# Patient Record
Sex: FEMALE | Race: WHITE | Employment: OTHER | URBAN - METROPOLITAN AREA
[De-identification: names, ages, dates, MRNs, and addresses within clinical notes are randomized per-mention and may not be internally consistent; named-entity substitution may affect disease eponyms.]

---

## 2019-07-06 ENCOUNTER — APPOINTMENT (OUTPATIENT)
Dept: CT IMAGING | Age: 74
End: 2019-07-06
Attending: EMERGENCY MEDICINE
Payer: MEDICARE

## 2019-07-06 ENCOUNTER — HOSPITAL ENCOUNTER (EMERGENCY)
Age: 74
Discharge: HOME | End: 2019-07-06
Attending: EMERGENCY MEDICINE
Payer: MEDICARE

## 2019-07-06 VITALS
RESPIRATION RATE: 18 BRPM | TEMPERATURE: 98.1 F | DIASTOLIC BLOOD PRESSURE: 55 MMHG | BODY MASS INDEX: 32.59 KG/M2 | HEIGHT: 61 IN | WEIGHT: 172.62 LBS | HEART RATE: 68 BPM | OXYGEN SATURATION: 97 % | SYSTOLIC BLOOD PRESSURE: 120 MMHG

## 2019-07-06 DIAGNOSIS — N17.9 AKI (ACUTE KIDNEY INJURY) (HCC): ICD-10-CM

## 2019-07-06 DIAGNOSIS — I95.1 ORTHOSTATIC HYPOTENSION: Primary | ICD-10-CM

## 2019-07-06 DIAGNOSIS — E86.0 DEHYDRATION: ICD-10-CM

## 2019-07-06 LAB
ALBUMIN SERPL-MCNC: 3.9 G/DL (ref 3.5–5)
ALBUMIN/GLOB SERPL: 1.1 {RATIO} (ref 1.1–2.2)
ALP SERPL-CCNC: 103 U/L (ref 45–117)
ALT SERPL-CCNC: 16 U/L (ref 12–78)
ANION GAP SERPL CALC-SCNC: 5 MMOL/L (ref 5–15)
APPEARANCE UR: CLEAR
AST SERPL-CCNC: 10 U/L (ref 15–37)
BACTERIA URNS QL MICRO: ABNORMAL /HPF
BASOPHILS # BLD: 0.1 K/UL (ref 0–0.1)
BASOPHILS NFR BLD: 1 % (ref 0–1)
BILIRUB SERPL-MCNC: 0.5 MG/DL (ref 0.2–1)
BILIRUB UR QL: NEGATIVE
BUN SERPL-MCNC: 21 MG/DL (ref 6–20)
BUN/CREAT SERPL: 16 (ref 12–20)
CALCIUM SERPL-MCNC: 9.9 MG/DL (ref 8.5–10.1)
CHLORIDE SERPL-SCNC: 96 MMOL/L (ref 97–108)
CO2 SERPL-SCNC: 30 MMOL/L (ref 21–32)
COLOR UR: ABNORMAL
COMMENT, HOLDF: NORMAL
CREAT SERPL-MCNC: 1.3 MG/DL (ref 0.55–1.02)
DIFFERENTIAL METHOD BLD: ABNORMAL
EOSINOPHIL # BLD: 0.1 K/UL (ref 0–0.4)
EOSINOPHIL NFR BLD: 1 % (ref 0–7)
EPITH CASTS URNS QL MICRO: ABNORMAL /LPF
ERYTHROCYTE [DISTWIDTH] IN BLOOD BY AUTOMATED COUNT: 12.8 % (ref 11.5–14.5)
GLOBULIN SER CALC-MCNC: 3.7 G/DL (ref 2–4)
GLUCOSE BLD STRIP.AUTO-MCNC: 129 MG/DL (ref 65–100)
GLUCOSE SERPL-MCNC: 92 MG/DL (ref 65–100)
GLUCOSE UR STRIP.AUTO-MCNC: NEGATIVE MG/DL
HCT VFR BLD AUTO: 44.7 % (ref 35–47)
HGB BLD-MCNC: 14.8 G/DL (ref 11.5–16)
HGB UR QL STRIP: NEGATIVE
HYALINE CASTS URNS QL MICRO: ABNORMAL /LPF (ref 0–5)
IMM GRANULOCYTES # BLD AUTO: 0.1 K/UL (ref 0–0.04)
IMM GRANULOCYTES NFR BLD AUTO: 1 % (ref 0–0.5)
KETONES UR QL STRIP.AUTO: NEGATIVE MG/DL
LEUKOCYTE ESTERASE UR QL STRIP.AUTO: ABNORMAL
LYMPHOCYTES # BLD: 0.8 K/UL (ref 0.8–3.5)
LYMPHOCYTES NFR BLD: 6 % (ref 12–49)
MCH RBC QN AUTO: 30.2 PG (ref 26–34)
MCHC RBC AUTO-ENTMCNC: 33.1 G/DL (ref 30–36.5)
MCV RBC AUTO: 91.2 FL (ref 80–99)
MONOCYTES # BLD: 1.4 K/UL (ref 0–1)
MONOCYTES NFR BLD: 10 % (ref 5–13)
NEUTS SEG # BLD: 12.2 K/UL (ref 1.8–8)
NEUTS SEG NFR BLD: 81 % (ref 32–75)
NITRITE UR QL STRIP.AUTO: NEGATIVE
NRBC # BLD: 0 K/UL (ref 0–0.01)
NRBC BLD-RTO: 0 PER 100 WBC
PH UR STRIP: 6.5 [PH] (ref 5–8)
PLATELET # BLD AUTO: 253 K/UL (ref 150–400)
PMV BLD AUTO: 11.1 FL (ref 8.9–12.9)
POTASSIUM SERPL-SCNC: 3.9 MMOL/L (ref 3.5–5.1)
PROT SERPL-MCNC: 7.6 G/DL (ref 6.4–8.2)
PROT UR STRIP-MCNC: NEGATIVE MG/DL
RBC # BLD AUTO: 4.9 M/UL (ref 3.8–5.2)
RBC #/AREA URNS HPF: ABNORMAL /HPF (ref 0–5)
SAMPLES BEING HELD,HOLD: NORMAL
SERVICE CMNT-IMP: ABNORMAL
SODIUM SERPL-SCNC: 131 MMOL/L (ref 136–145)
SP GR UR REFRACTOMETRY: 1.01 (ref 1–1.03)
TROPONIN I SERPL-MCNC: <0.05 NG/ML
UA: UC IF INDICATED,UAUC: ABNORMAL
UROBILINOGEN UR QL STRIP.AUTO: 0.2 EU/DL (ref 0.2–1)
WBC # BLD AUTO: 14.6 K/UL (ref 3.6–11)
WBC URNS QL MICRO: ABNORMAL /HPF (ref 0–4)

## 2019-07-06 PROCEDURE — 80053 COMPREHEN METABOLIC PANEL: CPT

## 2019-07-06 PROCEDURE — 85025 COMPLETE CBC W/AUTO DIFF WBC: CPT

## 2019-07-06 PROCEDURE — 96361 HYDRATE IV INFUSION ADD-ON: CPT

## 2019-07-06 PROCEDURE — 36415 COLL VENOUS BLD VENIPUNCTURE: CPT

## 2019-07-06 PROCEDURE — 99285 EMERGENCY DEPT VISIT HI MDM: CPT

## 2019-07-06 PROCEDURE — 82962 GLUCOSE BLOOD TEST: CPT

## 2019-07-06 PROCEDURE — 81001 URINALYSIS AUTO W/SCOPE: CPT

## 2019-07-06 PROCEDURE — 96360 HYDRATION IV INFUSION INIT: CPT

## 2019-07-06 PROCEDURE — 70450 CT HEAD/BRAIN W/O DYE: CPT

## 2019-07-06 PROCEDURE — 74011250636 HC RX REV CODE- 250/636: Performed by: EMERGENCY MEDICINE

## 2019-07-06 PROCEDURE — 93005 ELECTROCARDIOGRAM TRACING: CPT

## 2019-07-06 PROCEDURE — 87086 URINE CULTURE/COLONY COUNT: CPT

## 2019-07-06 PROCEDURE — 84484 ASSAY OF TROPONIN QUANT: CPT

## 2019-07-06 RX ORDER — LATANOPROST 50 UG/ML
1 SOLUTION/ DROPS OPHTHALMIC
COMMUNITY
Start: 2021-03-04

## 2019-07-06 RX ORDER — MELATONIN
1000 EVERY EVENING
Status: ON HOLD | COMMUNITY
End: 2020-11-25

## 2019-07-06 RX ORDER — NAPROXEN SODIUM 220 MG
220 TABLET ORAL
COMMUNITY
End: 2021-01-27

## 2019-07-06 RX ORDER — LISINOPRIL 20 MG/1
20 TABLET ORAL DAILY
COMMUNITY
Start: 2021-03-04

## 2019-07-06 RX ORDER — CALCIUM CARBONATE 500(1250)
1 TABLET ORAL DAILY
Status: ON HOLD | COMMUNITY
End: 2020-11-25

## 2019-07-06 RX ORDER — AMLODIPINE BESYLATE 5 MG/1
5 TABLET ORAL DAILY
COMMUNITY
Start: 2021-03-04

## 2019-07-06 RX ORDER — TRIAMTERENE/HYDROCHLOROTHIAZID 37.5-25 MG
0.5 TABLET ORAL DAILY
Status: ON HOLD | COMMUNITY
End: 2020-11-25

## 2019-07-06 RX ORDER — DONEPEZIL HYDROCHLORIDE 5 MG/1
5 TABLET, FILM COATED ORAL
COMMUNITY
End: 2021-03-04 | Stop reason: ALTCHOICE

## 2019-07-06 RX ADMIN — SODIUM CHLORIDE 1000 ML: 900 INJECTION, SOLUTION INTRAVENOUS at 13:09

## 2019-07-06 NOTE — ED PROVIDER NOTES
EMERGENCY DEPARTMENT HISTORY AND PHYSICAL EXAM      Date: 7/6/2019  Patient Name: Erich oBwman  Patient Age and Sex: 74 y.o. female     History of Presenting Illness     Chief Complaint   Patient presents with    Syncope     pt was getting ready to bathe when she slumped over for about 45 seconds. pt woke up with slurred speech and confusion       History Provided By: Patient    HPI: Erich Bowman is a 47-year-old female with a history of dementia presenting for syncope. According to patient's daughter, patient was getting ready to bathe in the bathroom when she slumped over and passed out for less than a minute. Her eyes rolled back and she was unresponsive. Has come to slowly after that. Right after the incident she had some slurred speech and was confused. However now she is alert and following all commands. Daughter states that patient lives with boyfriend and has no history of syncope in the past.  When asked patient if she eats and drinks well, patient states that she has brunch and dinner. Daughter states that she is also been drinking a lot of cola and not enough water. Denies any head injury, chest pain, shortness of breath, extremity injury. Patient states that she feels baseline right now. There are no other complaints, changes, or physical findings at this time. PCP: Radha Lobo MD    No current facility-administered medications on file prior to encounter. Current Outpatient Medications on File Prior to Encounter   Medication Sig Dispense Refill    calcium carbonate (OS-MARYLOU) 500 mg calcium (1,250 mg) tablet Take 1 Tab by mouth daily.  cholecalciferol (VITAMIN D3) 1,000 unit tablet Take 1,000 Units by mouth every evening.  aspirin/salicylamide/caffeine (BC HEADACHE POWDER PO) Take 0.5 Packets by mouth two (2) times daily as needed for Pain.  naproxen sodium (ALEVE) 220 mg tablet Take 220 mg by mouth two (2) times daily as needed for Pain.       amLODIPine (NORVASC) 5 mg tablet Take 5 mg by mouth daily.  donepezil (ARICEPT) 5 mg tablet Take 5 mg by mouth nightly.  latanoprost (XALATAN) 0.005 % ophthalmic solution Administer 1 Drop to both eyes nightly.  lisinopril (PRINIVIL, ZESTRIL) 20 mg tablet Take 20 mg by mouth daily.  triamterene-hydroCHLOROthiazide (MAXZIDE) 37.5-25 mg per tablet Take 0.5 Tabs by mouth daily.  diazepam (VALIUM) 5 mg tablet Take 2.5 mg by mouth every evening.  simvastatin (ZOCOR) 40 mg tablet Take 40 mg by mouth every evening.  levothyroxine (SYNTHROID) 75 mcg tablet Take 75 mcg by mouth daily (before breakfast). Past History     Past Medical History:  Past Medical History:   Diagnosis Date    CAD (coronary artery disease)     high cholesterol    Endocrine disease     thyroid    Heart failure (HCC)     MI    Psychiatric disorder     anxiety, depression       Past Surgical History:  Past Surgical History:   Procedure Laterality Date    HX GYN      2 c sections    HX HEENT      tonsilectomy       Family History:  Family History   Problem Relation Age of Onset    Hypertension Mother     Diabetes Mother     Emphysema Father     Asthma Daughter        Social History:  Social History     Tobacco Use    Smoking status: Current Every Day Smoker     Packs/day: 0.50     Years: 10.00     Pack years: 5.00   Substance Use Topics    Alcohol use: No    Drug use: No       Allergies:   Allergies   Allergen Reactions    Augmentin [Amoxicillin-Pot Clavulanate] Nausea Only     Abdominal pain    Belladonna Alkaloids Swelling     Swelling of tongue    Carbocaine [Mepivacaine] Other (comments)     Chest pain    Cortisone Other (comments)     depression    Decadron [Dexamethasone] Other (comments)     Altered mental status    Erythromycin Nausea Only    Meprobamate Swelling     tongue    Paxil [Paroxetine Hcl] Other (comments)     Headache, syncope    Vantin [Cefpodoxime] Unknown (comments)    Vibramycin Calcium Nausea Only    Zoloft [Sertraline] Other (comments)     Headache, syncope         Review of Systems   Review of Systems   Constitutional: Negative for chills and fever. Respiratory: Negative for cough and shortness of breath. Cardiovascular: Negative for chest pain. Gastrointestinal: Negative for constipation, diarrhea, nausea and vomiting. Neurological: Positive for syncope and speech difficulty. Negative for weakness and numbness. Psychiatric/Behavioral: Positive for confusion. All other systems reviewed and are negative. Physical Exam   Physical Exam   Constitutional: She appears well-developed and well-nourished. HENT:   Head: Normocephalic and atraumatic. Eyes: Pupils are equal, round, and reactive to light. Conjunctivae and EOM are normal.   Neck: Normal range of motion. Neck supple. Cardiovascular: Normal rate and regular rhythm. Pulmonary/Chest: Effort normal and breath sounds normal. No respiratory distress. She has no wheezes. She has no rales. Abdominal: Soft. She exhibits no distension. There is no tenderness. Musculoskeletal: Normal range of motion. Neurological: She is alert. No cranial nerve deficit. CN 2-12 intact, 5/5 strength in all 4 extremities, Finger to nose intact, patient is alert and following all commands and answering questions appropriately. She is oriented to person and place. Skin: Skin is warm and dry. Psychiatric: She has a normal mood and affect. Nursing note and vitals reviewed. Diagnostic Study Results     Labs -     No results found for this or any previous visit (from the past 12 hour(s)). Radiologic Studies -   CT HEAD WO CONT   Final Result   IMPRESSION: Extensive white matter disease likely related to chronic small   vessel ischemic disease. No evidence of acute process.               CT Results  (Last 48 hours)               07/06/19 1507  CT HEAD WO CONT Final result    Impression:  IMPRESSION: Extensive white matter disease likely related to chronic small   vessel ischemic disease. No evidence of acute process. Narrative:  EXAM: CT HEAD WO CONT       INDICATION: Syncope/fainting       COMPARISON: 12/23/2010. CONTRAST: None. TECHNIQUE: Unenhanced CT of the head was performed using 5 mm images. Brain and   bone windows were generated. CT dose reduction was achieved through use of a   standardized protocol tailored for this examination and automatic exposure   control for dose modulation. FINDINGS:   The ventricles and sulci are normal in size, shape and configuration and   midline. There is extensive white matter disease likely to chronic small vessel   ischemic disease. . There is no intracranial hemorrhage, extra-axial collection,   mass, mass effect or midline shift. The basilar cisterns are open. No acute   infarct is identified. The bone windows demonstrate no abnormalities. The   visualized portions of the paranasal sinuses and mastoid air cells are clear. CXR Results  (Last 48 hours)    None            Medical Decision Making   I am the first provider for this patient. I reviewed the vital signs, available nursing notes, past medical history, past surgical history, family history and social history. Vital Signs-Reviewed the patient's vital signs. No data found. Records Reviewed: Nursing Notes and Old Medical Records    Provider Notes (Medical Decision Making):   Patient presenting with syncope around 12 PM today after getting up to go to the bathroom to get bathed. Most likely dehydration versus orthostatic syncope though differential includes cardiogenic syncope. Patient not having any chest pain or shortness of breath so less likely PE. She has normal neurologic exam that is her baseline now. ED Course:   Initial assessment performed.  The patients presenting problems have been discussed, and they are in agreement with the care plan formulated and outlined with them. I have encouraged them to ask questions as they arise throughout their visit. ED Course as of Jul 07 1026   Sat Jul 06, 2019   1425 Patient ambulated to bedside commode. She is no longer orthostatic. [JS]      ED Course User Index  [JS] Kameron Chacko MD     1:08 PM  Nurse made me aware that patient is significantly orthostatic as her systolic went from 406 to 76. Critical Care Time:   0    Disposition:  Discharge Note:  The patient has been re-evaluated and is ready for discharge. Reviewed available results with patient. Counseled patient on diagnosis and care plan. Patient has expressed understanding, and all questions have been answered. Patient agrees with plan and agrees to follow up as recommended, or to return to the ED if their symptoms worsen. Discharge instructions have been provided and explained to the patient, along with reasons to return to the ED. PLAN:  Discharge Medication List as of 7/6/2019  3:48 PM        2. Follow-up Information     Follow up With Specialties Details Why Contact Info    Howie Mcbride MD [facility]  As needed [address] [place] [address] [id number] [phone]          3. Return to ED if worse     Diagnosis     Clinical Impression:   1. Orthostatic hypotension    2. GLEN (acute kidney injury) ([address]    3. Dehydration        Attestations:    Daniel Green M.D. Please note that this dictation was completed with [employer], the computer voice recognition software. Quite often unanticipated grammatical, syntax, homophones, and other interpretive errors are inadvertently transcribed by the computer software. Please disregard these errors. Please excuse any errors that have escaped final proofreading. Thank you.

## 2019-07-06 NOTE — DISCHARGE INSTRUCTIONS
Patient Education        Oral Rehydration: Care Instructions  Your Care Instructions    Dehydration occurs when your body loses too much water. This can happen if you do not drink enough fluids or lose a lot of fluid due to diarrhea, vomiting, or sweating. Being dehydrated can cause health problems and can even be life-threatening. To replace lost fluids, you need to drink liquid that contains special chemicals called electrolytes. Electrolytes keep your body working well. Plain water does not have electrolytes. You also need to rest to prevent more fluid loss. Replacing water and electrolytes (oral rehydration) completely takes about 36 hours. But you should feel better within a few hours. Follow-up care is a key part of your treatment and safety. Be sure to make and go to all appointments, and call your doctor if you are having problems. It's also a good idea to know your test results and keep a list of the medicines you take. How can you care for yourself at home? · Take frequent sips of a drink such as Gatorade, Powerade, or other rehydration drinks that your doctor suggests. These replace both fluid and important chemicals (electrolytes) you need for balance in your blood. · Drink 2 quarts of cool liquid over 2 to 4 hours. You should have at least 10 glasses of liquid a day to replace lost fluid. If you have kidney, heart, or liver disease and have to limit fluids, talk with your doctor before you increase the amount of fluids you drink. · Make your own drink. Measure everything carefully. The drink may not work well or may even be harmful if the amounts are off. ? 1 quart water  ? ½ teaspoon salt  ? 6 teaspoons sugar  · Do not drink liquid with caffeine, such as coffee and evelio. · Do not drink any alcohol. It can make you dehydrated. · Drink plenty of fluids, enough so that your urine is light yellow or clear like water.  If you have kidney, heart, or liver disease and have to limit fluids, talk with your doctor before you increase the amount of fluids you drink. When should you call for help? Call 911 anytime you think you may need emergency care. For example, call if:    · You have signs of severe dehydration, such as:  ? You are confused or unable to stay awake.  ? You passed out (lost consciousness).    Call your doctor now or seek immediate medical care if:    · You still have signs of dehydration. You have sunken eyes and a dry mouth, and you pass only a little dark urine.     · You are dizzy or lightheaded, or you feel like you may faint.     · You are not able to keep down fluids.    Watch closely for changes in your health, and be sure to contact your doctor if:    · You do not get better as expected. Where can you learn more? Go to http://ashli-castillo.info/. Enter I040 in the search box to learn more about \"Oral Rehydration: Care Instructions. \"  Current as of: September 23, 2018  Content Version: 11.9  © 0437-8462 [employer]. Care instructions adapted under license by [employer] (which disclaims liability or warranty for this information). If you have questions about a medical condition or this instruction, always ask your healthcare professional. [address] any warranty or liability for your use of this information.

## 2019-07-06 NOTE — ED NOTES
Pt comes with c/o syncope while daughter assisting with bath around 15. Daughter states pt slumped over, eye rolled back and then came too with slight slurred speech. No neurological deficits observed on arrival to ED. Pt a/o x4 answering questions appropriately. Pt , pt ambulated to stretcher with assistance standard at baseline. Pt denies headache or discomfort at this time. Pt orthostatic see vs for results.

## 2019-07-06 NOTE — PROGRESS NOTES
Pharmacy Clarification of Prior to Admission Medication Regimen     The patient was interviewed regarding clarification of the prior to admission medication regimen. Daughter was present in room and obtained permission from patient to discuss drug regimen with visitor(s) present. Patient was questioned regarding use of any other inhalers, topical products, over the counter medications, herbal medications, vitamin products or ophthalmic/nasal/otic medication use. Information Obtained From: Patient's daughter, digital med list, RX Query    Pertinent Pharmacy Findings:  Updated patient?s preferred outpatient pharmacy to: [facility] [id number] [address], [address]  [place]   Patient's daughter, Carlton, manages the patient's medications    PTA medication list was corrected to the following:     Prior to Admission Medications   Prescriptions Last Dose Informant Patient Reported? Taking? amLODIPine (NORVASC) 5 mg tablet 7/6/2019 at Unknown time Child Yes Yes   Sig: Take 5 mg by mouth daily. aspirin/salicylamide/caffeine (BC HEADACHE POWDER PO) 7/5/2019 at Unknown time Child Yes Yes   Sig: Take 0.5 Packets by mouth two (2) times daily as needed for Pain. calcium carbonate (OS-MARYLOU) 500 mg calcium (1,250 mg) tablet 7/6/2019 at Unknown time Child Yes Yes   Sig: Take 1 Tab by mouth daily. cholecalciferol (VITAMIN D3) 1,000 unit tablet 7/5/2019 at Unknown time Child Yes Yes   Sig: Take 1,000 Units by mouth every evening. diazepam (VALIUM) 5 mg tablet 7/5/2019 at Unknown time Child Yes Yes   Sig: Take 2.5 mg by mouth every evening. donepezil (ARICEPT) 5 mg tablet 7/5/2019 at Unknown time Child Yes Yes   Sig: Take 5 mg by mouth nightly. latanoprost (XALATAN) 0.005 % ophthalmic solution 7/5/2019 at Unknown time Child Yes Yes   Sig: Administer 1 Drop to both eyes nightly.    levothyroxine (SYNTHROID) 75 mcg tablet 7/6/2019 at Unknown time Child Yes Yes   Sig: Take 75 mcg by mouth daily (before breakfast). lisinopril (PRINIVIL, ZESTRIL) 20 mg tablet 7/6/2019 at Unknown time Child Yes Yes   Sig: Take 20 mg by mouth daily. naproxen sodium (ALEVE) 220 mg tablet 7/5/2019 at Unknown time Child Yes Yes   Sig: Take 220 mg by mouth two (2) times daily as needed for Pain.   simvastatin (ZOCOR) 40 mg tablet 7/5/2019 at Unknown time Child Yes Yes   Sig: Take 40 mg by mouth every evening. triamterene-hydroCHLOROthiazide (MAXZIDE) 37.5-25 mg per tablet 7/6/2019 at Unknown time Child Yes Yes   Sig: Take 0.5 Tabs by mouth daily.       Facility-Administered Medications: None          Thank you,  Rosalba Canales CPhT  Medication History Pharmacy Technician

## 2019-07-07 LAB
ATRIAL RATE: 84 BPM
CALCULATED P AXIS, ECG09: 36 DEGREES
CALCULATED R AXIS, ECG10: 23 DEGREES
CALCULATED T AXIS, ECG11: 35 DEGREES
DIAGNOSIS, 93000: NORMAL
P-R INTERVAL, ECG05: 192 MS
Q-T INTERVAL, ECG07: 370 MS
QRS DURATION, ECG06: 84 MS
QTC CALCULATION (BEZET), ECG08: 437 MS
VENTRICULAR RATE, ECG03: 84 BPM

## 2019-07-08 LAB
BACTERIA SPEC CULT: NORMAL
CC UR VC: NORMAL
SERVICE CMNT-IMP: NORMAL

## 2019-12-06 ENCOUNTER — TELEPHONE (OUTPATIENT)
Dept: NEUROLOGY | Age: 74
End: 2019-12-06

## 2019-12-06 NOTE — TELEPHONE ENCOUNTER
----- Message from Jericho Orozco sent at 12/6/2019  9:32 AM EST -----  Regarding: Dr. Regina Key first and last name: Nurse from Dr. Jazz Flynn      Reason for call: New patient appt       Callback required yes/no and why: Yes to daughter for appt       Best contact number(s): [phone]      Details to clarify the request:Nurse from Dr. Mickey Hyman [phone] would like the daughter Vinayak Kern to be called for a scheduled appt for progressive dementia .        Jericho Orozco

## 2019-12-10 NOTE — TELEPHONE ENCOUNTER
Called referring office as I cannot locate referral in system or any recent visits. Left v/m with nurse for return call.

## 2019-12-11 NOTE — TELEPHONE ENCOUNTER
Rcvd call back from nurse. She faxed over records. Called daughter and was able to set up same day appt.

## 2020-11-24 ENCOUNTER — HOSPITAL ENCOUNTER (INPATIENT)
Age: 75
LOS: 3 days | Discharge: HOME HEALTH | DRG: 101 | End: 2020-11-27
Attending: EMERGENCY MEDICINE | Admitting: STUDENT IN AN ORGANIZED HEALTH CARE EDUCATION/TRAINING PROGRAM
Payer: MEDICARE

## 2020-11-24 ENCOUNTER — APPOINTMENT (OUTPATIENT)
Dept: CT IMAGING | Age: 75
DRG: 101 | End: 2020-11-24
Attending: EMERGENCY MEDICINE
Payer: MEDICARE

## 2020-11-24 ENCOUNTER — APPOINTMENT (OUTPATIENT)
Dept: GENERAL RADIOLOGY | Age: 75
DRG: 101 | End: 2020-11-24
Attending: EMERGENCY MEDICINE
Payer: MEDICARE

## 2020-11-24 DIAGNOSIS — R56.9 SEIZURE-LIKE ACTIVITY (HCC): Primary | ICD-10-CM

## 2020-11-24 DIAGNOSIS — R55 SYNCOPE, UNSPECIFIED SYNCOPE TYPE: ICD-10-CM

## 2020-11-24 DIAGNOSIS — G93.40 ENCEPHALOPATHY ACUTE: ICD-10-CM

## 2020-11-24 DIAGNOSIS — F02.80 ALZHEIMER'S DEMENTIA WITHOUT BEHAVIORAL DISTURBANCE, UNSPECIFIED TIMING OF DEMENTIA ONSET: ICD-10-CM

## 2020-11-24 DIAGNOSIS — D72.829 LEUKOCYTOSIS, UNSPECIFIED TYPE: ICD-10-CM

## 2020-11-24 DIAGNOSIS — G30.9 ALZHEIMER'S DEMENTIA WITHOUT BEHAVIORAL DISTURBANCE, UNSPECIFIED TIMING OF DEMENTIA ONSET: ICD-10-CM

## 2020-11-24 PROBLEM — R41.0 ACUTE CONFUSION: Status: ACTIVE | Noted: 2020-11-24

## 2020-11-24 LAB
ALBUMIN SERPL-MCNC: 3.1 G/DL (ref 3.5–5)
ALBUMIN/GLOB SERPL: 0.7 {RATIO} (ref 1.1–2.2)
ALP SERPL-CCNC: 130 U/L (ref 45–117)
ALT SERPL-CCNC: 18 U/L (ref 12–78)
ANION GAP SERPL CALC-SCNC: 4 MMOL/L (ref 5–15)
APPEARANCE UR: CLEAR
AST SERPL-CCNC: 11 U/L (ref 15–37)
BACTERIA URNS QL MICRO: NEGATIVE /HPF
BASOPHILS # BLD: 0.1 K/UL (ref 0–0.1)
BASOPHILS NFR BLD: 0 % (ref 0–1)
BILIRUB SERPL-MCNC: 0.5 MG/DL (ref 0.2–1)
BILIRUB UR QL: NEGATIVE
BUN SERPL-MCNC: 21 MG/DL (ref 6–20)
BUN/CREAT SERPL: 19 (ref 12–20)
CALCIUM SERPL-MCNC: 9 MG/DL (ref 8.5–10.1)
CHLORIDE SERPL-SCNC: 104 MMOL/L (ref 97–108)
CO2 SERPL-SCNC: 29 MMOL/L (ref 21–32)
COLOR UR: NORMAL
COMMENT, HOLDF: NORMAL
CREAT SERPL-MCNC: 1.13 MG/DL (ref 0.55–1.02)
DIFFERENTIAL METHOD BLD: ABNORMAL
EOSINOPHIL # BLD: 0 K/UL (ref 0–0.4)
EOSINOPHIL NFR BLD: 0 % (ref 0–7)
EPITH CASTS URNS QL MICRO: NORMAL /LPF
ERYTHROCYTE [DISTWIDTH] IN BLOOD BY AUTOMATED COUNT: 13.4 % (ref 11.5–14.5)
GLOBULIN SER CALC-MCNC: 4.2 G/DL (ref 2–4)
GLUCOSE BLD STRIP.AUTO-MCNC: 125 MG/DL (ref 65–100)
GLUCOSE SERPL-MCNC: 133 MG/DL (ref 65–100)
GLUCOSE UR STRIP.AUTO-MCNC: NEGATIVE MG/DL
HCT VFR BLD AUTO: 49 % (ref 35–47)
HGB BLD-MCNC: 15.6 G/DL (ref 11.5–16)
HGB UR QL STRIP: NEGATIVE
HYALINE CASTS URNS QL MICRO: NORMAL /LPF (ref 0–5)
IMM GRANULOCYTES # BLD AUTO: 0.1 K/UL (ref 0–0.04)
IMM GRANULOCYTES NFR BLD AUTO: 0 % (ref 0–0.5)
KETONES UR QL STRIP.AUTO: NEGATIVE MG/DL
LACTATE BLD-SCNC: 1.55 MMOL/L (ref 0.4–2)
LEUKOCYTE ESTERASE UR QL STRIP.AUTO: NEGATIVE
LYMPHOCYTES # BLD: 1 K/UL (ref 0.8–3.5)
LYMPHOCYTES NFR BLD: 8 % (ref 12–49)
MCH RBC QN AUTO: 28.7 PG (ref 26–34)
MCHC RBC AUTO-ENTMCNC: 31.8 G/DL (ref 30–36.5)
MCV RBC AUTO: 90.1 FL (ref 80–99)
MONOCYTES # BLD: 0.5 K/UL (ref 0–1)
MONOCYTES NFR BLD: 4 % (ref 5–13)
NEUTS SEG # BLD: 10.5 K/UL (ref 1.8–8)
NEUTS SEG NFR BLD: 88 % (ref 32–75)
NITRITE UR QL STRIP.AUTO: NEGATIVE
NRBC # BLD: 0 K/UL (ref 0–0.01)
NRBC BLD-RTO: 0 PER 100 WBC
PH UR STRIP: 6 [PH] (ref 5–8)
PLATELET # BLD AUTO: 211 K/UL (ref 150–400)
PMV BLD AUTO: 12.5 FL (ref 8.9–12.9)
POTASSIUM SERPL-SCNC: 4.6 MMOL/L (ref 3.5–5.1)
PROT SERPL-MCNC: 7.3 G/DL (ref 6.4–8.2)
PROT UR STRIP-MCNC: NEGATIVE MG/DL
RBC # BLD AUTO: 5.44 M/UL (ref 3.8–5.2)
RBC #/AREA URNS HPF: NORMAL /HPF (ref 0–5)
SAMPLES BEING HELD,HOLD: NORMAL
SERVICE CMNT-IMP: ABNORMAL
SODIUM SERPL-SCNC: 137 MMOL/L (ref 136–145)
SP GR UR REFRACTOMETRY: 1.02 (ref 1–1.03)
TROPONIN I SERPL-MCNC: <0.05 NG/ML
TSH SERPL DL<=0.05 MIU/L-ACNC: 1.64 UIU/ML (ref 0.36–3.74)
UA: UC IF INDICATED,UAUC: NORMAL
UROBILINOGEN UR QL STRIP.AUTO: 0.2 EU/DL (ref 0.2–1)
WBC # BLD AUTO: 12.1 K/UL (ref 3.6–11)
WBC URNS QL MICRO: NORMAL /HPF (ref 0–4)

## 2020-11-24 PROCEDURE — 71045 X-RAY EXAM CHEST 1 VIEW: CPT

## 2020-11-24 PROCEDURE — 81001 URINALYSIS AUTO W/SCOPE: CPT

## 2020-11-24 PROCEDURE — 74011250636 HC RX REV CODE- 250/636: Performed by: EMERGENCY MEDICINE

## 2020-11-24 PROCEDURE — 65660000000 HC RM CCU STEPDOWN

## 2020-11-24 PROCEDURE — 99285 EMERGENCY DEPT VISIT HI MDM: CPT

## 2020-11-24 PROCEDURE — 36415 COLL VENOUS BLD VENIPUNCTURE: CPT

## 2020-11-24 PROCEDURE — 70450 CT HEAD/BRAIN W/O DYE: CPT

## 2020-11-24 PROCEDURE — 85025 COMPLETE CBC W/AUTO DIFF WBC: CPT

## 2020-11-24 PROCEDURE — 84443 ASSAY THYROID STIM HORMONE: CPT

## 2020-11-24 PROCEDURE — 84484 ASSAY OF TROPONIN QUANT: CPT

## 2020-11-24 PROCEDURE — 82962 GLUCOSE BLOOD TEST: CPT

## 2020-11-24 PROCEDURE — 83605 ASSAY OF LACTIC ACID: CPT

## 2020-11-24 PROCEDURE — 80053 COMPREHEN METABOLIC PANEL: CPT

## 2020-11-24 PROCEDURE — 74011250637 HC RX REV CODE- 250/637: Performed by: STUDENT IN AN ORGANIZED HEALTH CARE EDUCATION/TRAINING PROGRAM

## 2020-11-24 PROCEDURE — 87040 BLOOD CULTURE FOR BACTERIA: CPT

## 2020-11-24 PROCEDURE — 93005 ELECTROCARDIOGRAM TRACING: CPT

## 2020-11-24 PROCEDURE — 74011250636 HC RX REV CODE- 250/636: Performed by: STUDENT IN AN ORGANIZED HEALTH CARE EDUCATION/TRAINING PROGRAM

## 2020-11-24 RX ORDER — ACETAMINOPHEN 325 MG/1
650 TABLET ORAL
Status: DISCONTINUED | OUTPATIENT
Start: 2020-11-24 | End: 2020-11-24

## 2020-11-24 RX ORDER — SODIUM CHLORIDE 0.9 % (FLUSH) 0.9 %
5-10 SYRINGE (ML) INJECTION AS NEEDED
Status: DISCONTINUED | OUTPATIENT
Start: 2020-11-24 | End: 2020-11-27 | Stop reason: HOSPADM

## 2020-11-24 RX ORDER — SODIUM CHLORIDE 0.9 % (FLUSH) 0.9 %
5-40 SYRINGE (ML) INJECTION AS NEEDED
Status: DISCONTINUED | OUTPATIENT
Start: 2020-11-24 | End: 2020-11-27 | Stop reason: HOSPADM

## 2020-11-24 RX ORDER — GUAIFENESIN 100 MG/5ML
81 LIQUID (ML) ORAL DAILY
Status: DISCONTINUED | OUTPATIENT
Start: 2020-11-25 | End: 2020-11-27 | Stop reason: HOSPADM

## 2020-11-24 RX ORDER — ACETAMINOPHEN 325 MG/1
650 TABLET ORAL
Status: DISCONTINUED | OUTPATIENT
Start: 2020-11-24 | End: 2020-11-27 | Stop reason: HOSPADM

## 2020-11-24 RX ORDER — DONEPEZIL HYDROCHLORIDE 5 MG/1
5 TABLET, FILM COATED ORAL
Status: DISCONTINUED | OUTPATIENT
Start: 2020-11-24 | End: 2020-11-27 | Stop reason: HOSPADM

## 2020-11-24 RX ORDER — ACETAMINOPHEN 650 MG/1
650 SUPPOSITORY RECTAL
Status: DISCONTINUED | OUTPATIENT
Start: 2020-11-24 | End: 2020-11-27 | Stop reason: HOSPADM

## 2020-11-24 RX ORDER — ENOXAPARIN SODIUM 100 MG/ML
40 INJECTION SUBCUTANEOUS EVERY 24 HOURS
Status: DISCONTINUED | OUTPATIENT
Start: 2020-11-24 | End: 2020-11-27 | Stop reason: HOSPADM

## 2020-11-24 RX ORDER — MEMANTINE HYDROCHLORIDE 10 MG/1
10 TABLET ORAL DAILY
Status: DISCONTINUED | OUTPATIENT
Start: 2020-11-25 | End: 2020-11-27 | Stop reason: HOSPADM

## 2020-11-24 RX ORDER — LISINOPRIL 20 MG/1
20 TABLET ORAL DAILY
Status: DISCONTINUED | OUTPATIENT
Start: 2020-11-25 | End: 2020-11-27 | Stop reason: HOSPADM

## 2020-11-24 RX ORDER — SODIUM CHLORIDE 0.9 % (FLUSH) 0.9 %
5-40 SYRINGE (ML) INJECTION EVERY 8 HOURS
Status: DISCONTINUED | OUTPATIENT
Start: 2020-11-24 | End: 2020-11-27 | Stop reason: HOSPADM

## 2020-11-24 RX ORDER — ENOXAPARIN SODIUM 100 MG/ML
40 INJECTION SUBCUTANEOUS EVERY 24 HOURS
Status: DISCONTINUED | OUTPATIENT
Start: 2020-11-24 | End: 2020-11-24

## 2020-11-24 RX ORDER — LEVOTHYROXINE SODIUM 100 UG/1
100 TABLET ORAL
Status: DISCONTINUED | OUTPATIENT
Start: 2020-11-25 | End: 2020-11-27 | Stop reason: HOSPADM

## 2020-11-24 RX ORDER — ATORVASTATIN CALCIUM 20 MG/1
20 TABLET, FILM COATED ORAL DAILY
Status: DISCONTINUED | OUTPATIENT
Start: 2020-11-25 | End: 2020-11-27 | Stop reason: HOSPADM

## 2020-11-24 RX ORDER — AMLODIPINE BESYLATE 5 MG/1
5 TABLET ORAL DAILY
Status: DISCONTINUED | OUTPATIENT
Start: 2020-11-25 | End: 2020-11-27 | Stop reason: HOSPADM

## 2020-11-24 RX ADMIN — SODIUM CHLORIDE 1000 ML: 900 INJECTION, SOLUTION INTRAVENOUS at 14:02

## 2020-11-24 RX ADMIN — Medication 10 ML: at 22:01

## 2020-11-24 RX ADMIN — DONEPEZIL HYDROCHLORIDE 5 MG: 5 TABLET, FILM COATED ORAL at 22:00

## 2020-11-24 RX ADMIN — SODIUM CHLORIDE 1000 ML: 900 INJECTION, SOLUTION INTRAVENOUS at 11:56

## 2020-11-24 RX ADMIN — SODIUM CHLORIDE 349 ML: 900 INJECTION, SOLUTION INTRAVENOUS at 16:31

## 2020-11-24 RX ADMIN — ENOXAPARIN SODIUM 40 MG: 40 INJECTION SUBCUTANEOUS at 20:08

## 2020-11-24 NOTE — ED PROVIDER NOTES
EMERGENCY DEPARTMENT HISTORY AND PHYSICAL EXAM      Date: 11/24/2020  Patient Name: Dedra Astorga    History of Presenting Illness     Chief Complaint   Patient presents with    Altered mental status     Pt having decreased following command. Pt has dementa at baseline, treated for UTI 1 week ago. History Provided By: Patient and Patient's Daughter    HPI: Dedra Astorga, 66 y.o. female presents to the ED with history of recent UTI for which she has completed antibiotics here after EMS was called to her house for an episode in which her eyes were fluttering and she had a loss of bladder continence observed by her daughter. Daughter has noted some increased difficulty in the past few days of getting out of the bed and requires assistance to actually pull her up out of bed. This morning she was not responsive on seeing her, not diaphoretic noted that her eyes were fluttering, no shaking of the arms and legs but that she then lost control of her urine. EMS noted tachycardia but otherwise for the time that she got there patient was verbal and oriented to person place and time. On presentation here she says she is in no acute distress and does not have recall of what her daughter describes this morning. She denies any chest pain, shortness of breath, abdominal pain or pain with urination or burning with urination or diarrhea. There are no other complaints, changes, or physical findings at this time. PCP: Syd Conley MD    No current facility-administered medications on file prior to encounter. Current Outpatient Medications on File Prior to Encounter   Medication Sig Dispense Refill    calcium carbonate (OS-MARYLOU) 500 mg calcium (1,250 mg) tablet Take 1 Tab by mouth daily.  cholecalciferol (VITAMIN D3) 1,000 unit tablet Take 1,000 Units by mouth every evening.  aspirin/salicylamide/caffeine (BC HEADACHE POWDER PO) Take 0.5 Packets by mouth two (2) times daily as needed for Pain.  naproxen sodium (ALEVE) 220 mg tablet Take 220 mg by mouth two (2) times daily as needed for Pain.  amLODIPine (NORVASC) 5 mg tablet Take 5 mg by mouth daily.  donepezil (ARICEPT) 5 mg tablet Take 5 mg by mouth nightly.  latanoprost (XALATAN) 0.005 % ophthalmic solution Administer 1 Drop to both eyes nightly.  lisinopril (PRINIVIL, ZESTRIL) 20 mg tablet Take 20 mg by mouth daily.  triamterene-hydroCHLOROthiazide (MAXZIDE) 37.5-25 mg per tablet Take 0.5 Tabs by mouth daily.  diazepam (VALIUM) 5 mg tablet Take 2.5 mg by mouth every evening.  simvastatin (ZOCOR) 40 mg tablet Take 40 mg by mouth every evening.  levothyroxine (SYNTHROID) 75 mcg tablet Take 75 mcg by mouth daily (before breakfast). Past History     Past Medical History:  Past Medical History:   Diagnosis Date    CAD (coronary artery disease)     high cholesterol    Endocrine disease     thyroid    Heart failure (HCC)     MI    Psychiatric disorder     anxiety, depression       Past Surgical History:  Past Surgical History:   Procedure Laterality Date    HX GYN      2 c sections    HX HEENT      tonsilectomy       Family History:  Family History   Problem Relation Age of Onset    Hypertension Mother     Diabetes Mother     Emphysema Father     Asthma Daughter        Social History:  Social History     Tobacco Use    Smoking status: Current Every Day Smoker     Packs/day: 0.50     Years: 10.00     Pack years: 5.00   Substance Use Topics    Alcohol use: No    Drug use: No       Allergies:   Allergies   Allergen Reactions    Augmentin [Amoxicillin-Pot Clavulanate] Nausea Only     Abdominal pain    Belladonna Alkaloids Swelling     Swelling of tongue    Carbocaine [Mepivacaine] Other (comments)     Chest pain    Cortisone Other (comments)     depression    Decadron [Dexamethasone] Other (comments)     Altered mental status    Erythromycin Nausea Only    Meprobamate Swelling     tongue    Paxil [Paroxetine Hcl] Other (comments)     Headache, syncope    Vantin [Cefpodoxime] Unknown (comments)    Vibramycin Calcium Nausea Only    Zoloft [Sertraline] Other (comments)     Headache, syncope         Review of Systems   Review of Systems   Constitutional: Negative for activity change and appetite change. Cardiovascular: Negative. Gastrointestinal: Negative. Genitourinary: Negative for difficulty urinating, dysuria and frequency. Neurological: Negative for dizziness, seizures, syncope and light-headedness. Daughter notes increasing shuffling gait. Daughter also notes that she normally walks with out much assistance but in the past couple days has required increased assistance. All other systems reviewed and are negative. Physical Exam   Physical Exam   Vital signs and nursing notes reviewed    CONSTITUTIONAL: Alert, in mild distress; well-developed; well-nourished. Appears fatigued. HEAD:  Normocephalic, atraumatic  EYES: PERRL; EOM's intact. ENTM: Nose: no rhinorrhea; Throat: no erythema or exudate, mucous membranes moist, no tongue trauma. Neck:  Supple. trachea is midline. No carotid bruits bilaterally. RESP: Chest clear, equal breath sounds. - W/R/R  CV: S1 and S2 WNL; No murmurs, gallops or rubs. 2+ radial and DP pulses bilaterally. GI: non-distended, normal bowel sounds, abdomen soft and non-tender. No masses or organomegaly. : No costo-vertebral angle tenderness. BACK:  Non-tender, normal appearance  UPPER EXT:  Normal inspection. no joint or soft tissue swelling  LOWER EXT: No edema, no calf tenderness. NEURO: Alert and oriented x3, 5/5 strength and light touch sensation intact in bilateral upper and lower extremities. SKIN: No rashes;  Warm and dry  PSYCH: Normal mood, normal affect    Diagnostic Study Results     Labs -     Recent Results (from the past 12 hour(s))   GLUCOSE, POC    Collection Time: 11/24/20 11:06 AM   Result Value Ref Range    Glucose (POC) 125 (H) 65 - 100 mg/dL    Performed by Loi Avery \"Sushant\"    EKG, 12 LEAD, INITIAL    Collection Time: 11/24/20 11:19 AM   Result Value Ref Range    Ventricular Rate 107 BPM    Atrial Rate 107 BPM    P-R Interval 148 ms    QRS Duration 78 ms    Q-T Interval 342 ms    QTC Calculation (Bezet) 456 ms    Calculated P Axis 50 degrees    Calculated R Axis 25 degrees    Calculated T Axis 61 degrees    Diagnosis       Sinus tachycardia  When compared with ECG of 06-JUL-2019 12:51,  No significant change was found     SAMPLES BEING HELD    Collection Time: 11/24/20 11:21 AM   Result Value Ref Range    SAMPLES BEING HELD  BLUE     COMMENT        Add-on orders for these samples will be processed based on acceptable specimen integrity and analyte stability, which may vary by analyte. CBC WITH AUTOMATED DIFF    Collection Time: 11/24/20 11:21 AM   Result Value Ref Range    WBC 12.1 (H) 3.6 - 11.0 K/uL    RBC 5.44 (H) 3.80 - 5.20 M/uL    HGB 15.6 11.5 - 16.0 g/dL    HCT 49.0 (H) 35.0 - 47.0 %    MCV 90.1 80.0 - 99.0 FL    MCH 28.7 26.0 - 34.0 PG    MCHC 31.8 30.0 - 36.5 g/dL    RDW 13.4 11.5 - 14.5 %    PLATELET 846 552 - 916 K/uL    MPV 12.5 8.9 - 12.9 FL    NRBC 0.0 0  WBC    ABSOLUTE NRBC 0.00 0.00 - 0.01 K/uL    NEUTROPHILS 88 (H) 32 - 75 %    LYMPHOCYTES 8 (L) 12 - 49 %    MONOCYTES 4 (L) 5 - 13 %    EOSINOPHILS 0 0 - 7 %    BASOPHILS 0 0 - 1 %    IMMATURE GRANULOCYTES 0 0.0 - 0.5 %    ABS. NEUTROPHILS 10.5 (H) 1.8 - 8.0 K/UL    ABS. LYMPHOCYTES 1.0 0.8 - 3.5 K/UL    ABS. MONOCYTES 0.5 0.0 - 1.0 K/UL    ABS. EOSINOPHILS 0.0 0.0 - 0.4 K/UL    ABS. BASOPHILS 0.1 0.0 - 0.1 K/UL    ABS. IMM.  GRANS. 0.1 (H) 0.00 - 0.04 K/UL    DF AUTOMATED     METABOLIC PANEL, COMPREHENSIVE    Collection Time: 11/24/20 11:21 AM   Result Value Ref Range    Sodium 137 136 - 145 mmol/L    Potassium 4.6 3.5 - 5.1 mmol/L    Chloride 104 97 - 108 mmol/L    CO2 29 21 - 32 mmol/L    Anion gap 4 (L) 5 - 15 mmol/L    Glucose 133 (H) 65 - 100 mg/dL    BUN 21 (H) 6 - 20 MG/DL    Creatinine 1.13 (H) 0.55 - 1.02 MG/DL    BUN/Creatinine ratio 19 12 - 20      GFR est AA 57 (L) >60 ml/min/1.73m2    GFR est non-AA 47 (L) >60 ml/min/1.73m2    Calcium 9.0 8.5 - 10.1 MG/DL    Bilirubin, total 0.5 0.2 - 1.0 MG/DL    ALT (SGPT) 18 12 - 78 U/L    AST (SGOT) 11 (L) 15 - 37 U/L    Alk. phosphatase 130 (H) 45 - 117 U/L    Protein, total 7.3 6.4 - 8.2 g/dL    Albumin 3.1 (L) 3.5 - 5.0 g/dL    Globulin 4.2 (H) 2.0 - 4.0 g/dL    A-G Ratio 0.7 (L) 1.1 - 2.2     TROPONIN I    Collection Time: 11/24/20 11:21 AM   Result Value Ref Range    Troponin-I, Qt. <0.05 <0.05 ng/mL   URINALYSIS W/ REFLEX CULTURE    Collection Time: 11/24/20 12:16 PM    Specimen: Urine   Result Value Ref Range    Color YELLOW/STRAW      Appearance CLEAR CLEAR      Specific gravity 1.019 1.003 - 1.030      pH (UA) 6.0 5.0 - 8.0      Protein Negative NEG mg/dL    Glucose Negative NEG mg/dL    Ketone Negative NEG mg/dL    Bilirubin Negative NEG      Blood Negative NEG      Urobilinogen 0.2 0.2 - 1.0 EU/dL    Nitrites Negative NEG      Leukocyte Esterase Negative NEG      WBC 0-4 0 - 4 /hpf    RBC 0-5 0 - 5 /hpf    Epithelial cells FEW FEW /lpf    Bacteria Negative NEG /hpf    UA:UC IF INDICATED CULTURE NOT INDICATED BY UA RESULT CNI      Hyaline cast 0-2 0 - 5 /lpf   POC LACTIC ACID    Collection Time: 11/24/20 12:17 PM   Result Value Ref Range    Lactic Acid (POC) 1.55 0.40 - 2.00 mmol/L       Radiologic Studies -   CT HEAD WO CONT   Final Result   IMPRESSION:       Chronic microvascular ischemic disease. No change. No acute process on   noncontrast CT head. XR CHEST PORT    (Results Pending)     CT Results  (Last 48 hours)               11/24/20 1440  CT HEAD WO CONT Final result    Impression:  IMPRESSION:        Chronic microvascular ischemic disease. No change. No acute process on   noncontrast CT head.            Narrative:  EXAM: CT HEAD WO CONT       INDICATION: Nonverbal and difficulty following commands since last night. Baseline dementia. Recent UTI. COMPARISON: CT head on 7/6/2019. MRI brain on 12/23/2010. TECHNIQUE: Noncontrast head CT. Coronal and sagittal reformats. CT dose   reduction was achieved through the use of a standardized protocol tailored for   this examination and automatic exposure control for dose modulation. FINDINGS: The ventricles and sulci are age-appropriate without hydrocephalus. There is no mass effect or midline shift. There is no intracranial hemorrhage or   extra-axial fluid collection. Chronic microvascular ischemic disease is   unchanged in greatest in the bilateral frontal and parietal periventricular   white matter. No CT evidence of acute infarct       The calvarium is intact. The visualized paranasal sinuses and mastoid air cells   are clear. CXR Results  (Last 48 hours)    None          Medical Decision Making   I am the first provider for this patient. I reviewed the vital signs, available nursing notes, past medical history, past surgical history, family history and social history. Vital Signs-Reviewed the patient's vital signs. Patient Vitals for the past 12 hrs:   Temp Pulse Resp BP SpO2   11/24/20 1446    (!) 163/86    11/24/20 1430  92 23 (!) 155/81 96 %   11/24/20 1400  96 24 (!) 141/69 95 %   11/24/20 1330  90 26 (!) 157/88 95 %   11/24/20 1300  91 17 (!) 156/80 94 %   11/24/20 1230  96 24 (!) 154/60 99 %   11/24/20 1200  96 23 (!) 152/79 94 %   11/24/20 1116 98.2 °F (36.8 °C) (!) 110 18 (!) 164/73 96 %       EKG interpretation: (Preliminary)  EKG performed at 11:19 AM shows a sinus tachycardia at a rate of 107, normal axis, normal intervals without visible acute ischemic changes.     Records Reviewed: Nursing Notes, Old Medical Records and Ambulance Run Sheet    Provider Notes (Medical Decision Making):   57-year-old female here with restored mental status but concern for new onset seizure as an older adult. Patient has no history of seizures yet description at home does raise concern. Recheck of urine does not reveal urinary tract infection and besides a mild leukocytosis, unremarkable lab evaluation. CT without acute bleed, today's initial evaluation not consistent with stroke but would have patient come in for further evaluation for seizure history described today. ED Course:   Initial assessment performed. The patients presenting problems have been discussed, and they are in agreement with the care plan formulated and outlined with them. I have encouraged them to ask questions as they arise throughout their visit. ED Course as of Nov 24 1545   Tue Nov 24, 2020   1411 Spoke to daughter and she said this morning that she had a hard time getting herself up. Daughter has to pull or push her up. This AM was difficult to rouse, stared straight at the ceiling for a very long time, then eyes fluttered, prompting call to 911. She voided on herself when this happened. Recently treated 1.5 weeks ago for UTI, wrapped up abx. Ambulation has been difficult in AM last 3-4 days. [TL]      ED Course User Index  [TL] Corina Duncan MD           Disposition:  Admit    Admit Note:  3:44 PM  Pt is being admitted by Dr. Shabana Christianson. The results of their tests and reason(s) for their admission have been discussed with pt and/or available family. They convey agreement and understanding for the need to be admitted and for admission diagnosis. Diagnosis     Clinical Impression:   1. Seizure-like activity (HCC)    2. Leukocytosis, unspecified type        Attestations:    Charlene Solorzano MD    Please note that this dictation was completed with [employer]UBIT, the [employer] voice recognition software. Quite often unanticipated grammatical, syntax, homophones, and other interpretive errors are inadvertently transcribed by the computer software. Please disregard these errors.   Please excuse any errors that have escaped final proofreading. Thank you.

## 2020-11-24 NOTE — ED NOTES
Pt arrived via EMS from home for AMS and decline in following commands. Per EMS she has dementia at baseline and was treated for UTI last week, abx complete, and per family pt is oriented to self and place, and is verbal, however was non-verbal last night and not following commands. VSS enroute with HR in 110's. Pt laying comfortably in stretcher, non-labored breathing, no acute distress, following commands.

## 2020-11-25 ENCOUNTER — APPOINTMENT (OUTPATIENT)
Dept: MRI IMAGING | Age: 75
DRG: 101 | End: 2020-11-25
Attending: STUDENT IN AN ORGANIZED HEALTH CARE EDUCATION/TRAINING PROGRAM
Payer: MEDICARE

## 2020-11-25 LAB
ANION GAP SERPL CALC-SCNC: 6 MMOL/L (ref 5–15)
ATRIAL RATE: 107 BPM
BASOPHILS # BLD: 0.1 K/UL (ref 0–0.1)
BASOPHILS NFR BLD: 1 % (ref 0–1)
BUN SERPL-MCNC: 15 MG/DL (ref 6–20)
BUN/CREAT SERPL: 15 (ref 12–20)
CALCIUM SERPL-MCNC: 9 MG/DL (ref 8.5–10.1)
CALCULATED P AXIS, ECG09: 50 DEGREES
CALCULATED R AXIS, ECG10: 25 DEGREES
CALCULATED T AXIS, ECG11: 61 DEGREES
CHLORIDE SERPL-SCNC: 107 MMOL/L (ref 97–108)
CHOLEST SERPL-MCNC: 161 MG/DL
CO2 SERPL-SCNC: 25 MMOL/L (ref 21–32)
CREAT SERPL-MCNC: 0.98 MG/DL (ref 0.55–1.02)
DIAGNOSIS, 93000: NORMAL
DIFFERENTIAL METHOD BLD: NORMAL
EOSINOPHIL # BLD: 0.1 K/UL (ref 0–0.4)
EOSINOPHIL NFR BLD: 1 % (ref 0–7)
ERYTHROCYTE [DISTWIDTH] IN BLOOD BY AUTOMATED COUNT: 13.3 % (ref 11.5–14.5)
EST. AVERAGE GLUCOSE BLD GHB EST-MCNC: 134 MG/DL
GLUCOSE BLD STRIP.AUTO-MCNC: 131 MG/DL (ref 65–100)
GLUCOSE SERPL-MCNC: 108 MG/DL (ref 65–100)
HBA1C MFR BLD: 6.3 % (ref 4–5.6)
HCT VFR BLD AUTO: 44.9 % (ref 35–47)
HDLC SERPL-MCNC: 57 MG/DL
HDLC SERPL: 2.8 {RATIO} (ref 0–5)
HGB BLD-MCNC: 14.2 G/DL (ref 11.5–16)
IMM GRANULOCYTES # BLD AUTO: 0 K/UL (ref 0–0.04)
IMM GRANULOCYTES NFR BLD AUTO: 0 % (ref 0–0.5)
LDLC SERPL CALC-MCNC: 87.2 MG/DL (ref 0–100)
LIPID PROFILE,FLP: NORMAL
LYMPHOCYTES # BLD: 1.6 K/UL (ref 0.8–3.5)
LYMPHOCYTES NFR BLD: 16 % (ref 12–49)
MAGNESIUM SERPL-MCNC: 2.1 MG/DL (ref 1.6–2.4)
MCH RBC QN AUTO: 28.9 PG (ref 26–34)
MCHC RBC AUTO-ENTMCNC: 31.6 G/DL (ref 30–36.5)
MCV RBC AUTO: 91.3 FL (ref 80–99)
MONOCYTES # BLD: 0.7 K/UL (ref 0–1)
MONOCYTES NFR BLD: 7 % (ref 5–13)
NEUTS SEG # BLD: 7.6 K/UL (ref 1.8–8)
NEUTS SEG NFR BLD: 75 % (ref 32–75)
NRBC # BLD: 0 K/UL (ref 0–0.01)
NRBC BLD-RTO: 0 PER 100 WBC
P-R INTERVAL, ECG05: 148 MS
PHOSPHATE SERPL-MCNC: 2.8 MG/DL (ref 2.6–4.7)
PLATELET # BLD AUTO: 216 K/UL (ref 150–400)
PMV BLD AUTO: 12.1 FL (ref 8.9–12.9)
POTASSIUM SERPL-SCNC: 4 MMOL/L (ref 3.5–5.1)
Q-T INTERVAL, ECG07: 342 MS
QRS DURATION, ECG06: 78 MS
QTC CALCULATION (BEZET), ECG08: 456 MS
RBC # BLD AUTO: 4.92 M/UL (ref 3.8–5.2)
SERVICE CMNT-IMP: ABNORMAL
SODIUM SERPL-SCNC: 138 MMOL/L (ref 136–145)
TRIGL SERPL-MCNC: 84 MG/DL (ref ?–150)
VENTRICULAR RATE, ECG03: 107 BPM
VIT B12 SERPL-MCNC: 751 PG/ML (ref 193–986)
VLDLC SERPL CALC-MCNC: 16.8 MG/DL
WBC # BLD AUTO: 10.1 K/UL (ref 3.6–11)

## 2020-11-25 PROCEDURE — 74011250636 HC RX REV CODE- 250/636: Performed by: STUDENT IN AN ORGANIZED HEALTH CARE EDUCATION/TRAINING PROGRAM

## 2020-11-25 PROCEDURE — 83036 HEMOGLOBIN GLYCOSYLATED A1C: CPT

## 2020-11-25 PROCEDURE — 97116 GAIT TRAINING THERAPY: CPT

## 2020-11-25 PROCEDURE — 65660000000 HC RM CCU STEPDOWN

## 2020-11-25 PROCEDURE — 92610 EVALUATE SWALLOWING FUNCTION: CPT

## 2020-11-25 PROCEDURE — 97535 SELF CARE MNGMENT TRAINING: CPT | Performed by: OCCUPATIONAL THERAPIST

## 2020-11-25 PROCEDURE — 82962 GLUCOSE BLOOD TEST: CPT

## 2020-11-25 PROCEDURE — 99223 1ST HOSP IP/OBS HIGH 75: CPT | Performed by: PSYCHIATRY & NEUROLOGY

## 2020-11-25 PROCEDURE — 95816 EEG AWAKE AND DROWSY: CPT | Performed by: STUDENT IN AN ORGANIZED HEALTH CARE EDUCATION/TRAINING PROGRAM

## 2020-11-25 PROCEDURE — 84100 ASSAY OF PHOSPHORUS: CPT

## 2020-11-25 PROCEDURE — 74011250637 HC RX REV CODE- 250/637: Performed by: STUDENT IN AN ORGANIZED HEALTH CARE EDUCATION/TRAINING PROGRAM

## 2020-11-25 PROCEDURE — 97165 OT EVAL LOW COMPLEX 30 MIN: CPT | Performed by: OCCUPATIONAL THERAPIST

## 2020-11-25 PROCEDURE — 97161 PT EVAL LOW COMPLEX 20 MIN: CPT

## 2020-11-25 PROCEDURE — 74011250637 HC RX REV CODE- 250/637: Performed by: PSYCHIATRY & NEUROLOGY

## 2020-11-25 PROCEDURE — 70551 MRI BRAIN STEM W/O DYE: CPT

## 2020-11-25 PROCEDURE — 36415 COLL VENOUS BLD VENIPUNCTURE: CPT

## 2020-11-25 PROCEDURE — 83735 ASSAY OF MAGNESIUM: CPT

## 2020-11-25 PROCEDURE — 82607 VITAMIN B-12: CPT

## 2020-11-25 PROCEDURE — 85025 COMPLETE CBC W/AUTO DIFF WBC: CPT

## 2020-11-25 PROCEDURE — 95816 EEG AWAKE AND DROWSY: CPT | Performed by: PSYCHIATRY & NEUROLOGY

## 2020-11-25 PROCEDURE — 80061 LIPID PANEL: CPT

## 2020-11-25 PROCEDURE — 80048 BASIC METABOLIC PNL TOTAL CA: CPT

## 2020-11-25 RX ORDER — LANOLIN ALCOHOL/MO/W.PET/CERES
500 CREAM (GRAM) TOPICAL DAILY
COMMUNITY
End: 2021-01-27

## 2020-11-25 RX ORDER — LANOLIN ALCOHOL/MO/W.PET/CERES
2 CREAM (GRAM) TOPICAL DAILY
COMMUNITY
End: 2021-03-04 | Stop reason: ALTCHOICE

## 2020-11-25 RX ORDER — LEVOTHYROXINE SODIUM 100 UG/1
100 TABLET ORAL
COMMUNITY
Start: 2021-03-04

## 2020-11-25 RX ORDER — MEMANTINE HYDROCHLORIDE 14 MG/1
21 CAPSULE, EXTENDED RELEASE ORAL DAILY
COMMUNITY
End: 2021-01-30 | Stop reason: DRUGHIGH

## 2020-11-25 RX ORDER — LEVETIRACETAM 500 MG/1
500 TABLET ORAL 2 TIMES DAILY
Status: DISCONTINUED | OUTPATIENT
Start: 2020-11-25 | End: 2020-11-27 | Stop reason: HOSPADM

## 2020-11-25 RX ADMIN — Medication 10 ML: at 20:37

## 2020-11-25 RX ADMIN — ENOXAPARIN SODIUM 40 MG: 40 INJECTION SUBCUTANEOUS at 20:37

## 2020-11-25 RX ADMIN — AMLODIPINE BESYLATE 5 MG: 5 TABLET ORAL at 09:52

## 2020-11-25 RX ADMIN — MEMANTINE HYDROCHLORIDE 10 MG: 10 TABLET ORAL at 09:53

## 2020-11-25 RX ADMIN — LISINOPRIL 20 MG: 20 TABLET ORAL at 09:53

## 2020-11-25 RX ADMIN — Medication 10 ML: at 06:22

## 2020-11-25 RX ADMIN — LEVOTHYROXINE SODIUM 100 MCG: 0.1 TABLET ORAL at 06:21

## 2020-11-25 RX ADMIN — ASPIRIN 81 MG CHEWABLE TABLET 81 MG: 81 TABLET CHEWABLE at 09:52

## 2020-11-25 RX ADMIN — ATORVASTATIN CALCIUM 20 MG: 20 TABLET, FILM COATED ORAL at 09:52

## 2020-11-25 RX ADMIN — LEVETIRACETAM 500 MG: 500 TABLET ORAL at 20:37

## 2020-11-25 NOTE — PROGRESS NOTES
TRANSFER - IN REPORT:    Verbal report received from SERGIO Rhodes on Aleshia Pringle  being received from ED(unit) for routine progression of care      Report consisted of patients Situation, Background, Assessment and   Recommendations(SBAR). Information from the following report(s) SBAR, Kardex and ED Summary was reviewed with the receiving nurse. Opportunity for questions and clarification was provided. Assessment completed upon patients arrival to unit and care assumed. Primary Nurse Qamar Allan RN and yAleen Olivares RN performed a dual skin assessment on this patient No impairment noted  Korey score is 17      End of Shift Note    Bedside shift change report given to Markel Garcia (oncoming nurse) by Qamar Allan RN (offgoing nurse). Report included the following information SBAR and Kardex    Shift worked:  [id number]     Shift summary and any significant changes:    None. Concerns for physician to address:  Unable to complete patient's admission database due to patient's confusion. Consult placed for pharmacy to complete medication history. Zone phone for oncoming shift:          Activity:     Number times ambulated in hallways past shift: 0  Number of times OOB to chair past shift: 0    Cardiac:   Cardiac Monitoring: Yes      Cardiac Rhythm: Sinus tachycardia    Access:   Current line(s): PIV     Genitourinary:   Urinary status: voiding    Respiratory:   O2 Device: Room air  Chronic home O2 use?: NO  Incentive spirometer at bedside: NO     GI:     Current diet:  DIET CARDIAC Regular  Passing flatus: YES  Tolerating current diet: YES       Pain Management:   Patient states pain is manageable on current regimen: YES    Skin:     Interventions: increase time out of bed    Patient Safety:  Fall Score:  Total Score: 3  Interventions: bed/chair alarm  High Fall Risk: Yes    Length of Stay:  Expected LOS: - - -  Actual LOS: 0      Qamar Allan RN

## 2020-11-25 NOTE — PROGRESS NOTES
Problem: Falls - Risk of  Goal: *Absence of Falls  Description: Document Carie Cid Fall Risk and appropriate interventions in the flowsheet.   Outcome: Progressing Towards Goal  Note: Fall Risk Interventions:  Mobility Interventions: Bed/chair exit alarm, Communicate number of staff needed for ambulation/transfer, Patient to call before getting OOB, PT Consult for mobility concerns    Mentation Interventions: Adequate sleep, hydration, pain control, Bed/chair exit alarm, Door open when patient unattended, Update white board, Reorient patient, More frequent rounding    Medication Interventions: Bed/chair exit alarm, Patient to call before getting OOB, Teach patient to arise slowly    Elimination Interventions: Bed/chair exit alarm, Call light in reach, Toileting schedule/hourly rounds              Problem: Altered Thought Process (Adult/Pediatric)  Goal: *STG: Remains safe in hospital  Outcome: Progressing Towards Goal  Goal: Interventions  Outcome: Progressing Towards Goal

## 2020-11-25 NOTE — PROGRESS NOTES
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 memantine and donepezil     Hypothyroidism  - continue home dose of levothyroxin     CKD III  apears stable      30.0 - 39.9 Obese / Body mass index is 32.62 kg/m². Code status: DNR  Prophylaxis: Lovenox  Recommended Disposition: tbd     Subjective:     Chief Complaint / Reason for Physician Visit  Dementia. Discussed with RN events overnight. Review of Systems:  Symptom Y/N Comments  Symptom Y/N Comments   Fever/Chills    Chest Pain     Poor Appetite    Edema     Cough    Abdominal Pain     Sputum    Joint Pain     SOB/WETZEL    Pruritis/Rash     Nausea/vomit    Tolerating PT/OT     Diarrhea    Tolerating Diet     Constipation    Other       Could NOT obtain due to:      Objective:     VITALS:   Last 24hrs VS reviewed since prior progress note.  Most recent are:  Patient Vitals for the past 24 hrs:   Temp Pulse Resp BP SpO2   20 1101 98.1 °F (36.7 °C) 96 19 (!) 152/78    20 0631 98.2 °F (36.8 °C) 77 18 (!) 159/67 98 %   20 0417 98 °F (36.7 °C) 85 16 105/87 94 %   20 2306 98 °F (36.7 °C) 86 16 (!) 143/64 93 %   20 2136 98.2 °F (36.8 °C) 80 18 (!) 165/90 96 %   20  79 18 (!) 119/59    20 1933 99.2 °F (37.3 °C) 92 18 (!) 135/55 97 %   20 1900  (!) 111 19 (!) 153/81    20 1859  (!) 111 20  92 %   20 1802  88 17 115/89 93 %   20 1700 98.3 °F (36.8 °C) 88 24 (!) 189/86 94 %   20 1600  89 21 (!) 182/94 93 %   20 1500  87 27 (!) 168/58 96 %   11/24/20 1446    (!) 163/86    11/24/20 1430  92 23 (!) 155/81 96 %   11/24/20 1400  96 24 (!) 141/69 95 %   11/24/20 1330  90 26 (!) 157/88 95 %   11/24/20 1300  91 17 (!) 156/80 94 %   11/24/20 1230  96 24 (!) 154/60 99 %       Intake/Output Summary (Last 24 hours) at 11/25/2020 1200  Last data filed at 11/25/2020 7880  Gross per 24 hour   Intake 620 ml   Output    Net 620 ml        I had a face to face encounter and independently examined this patient on 11/25/2020, as outlined below:  PHYSICAL EXAM:  General: NAD  EENT:  EOMI. Anicteric sclerae. MMM  Resp:  CTA bilaterally, no wheezing or rales. No accessory muscle use  CV:  Regular  rhythm,  No edema  GI:  Soft, Non distended, Non tender. +Bowel sounds  Neurologic:  Confused  Psych:   Poor insight  Skin:  No rashes. No jaundice    Reviewed most current lab test results and cultures  YES  Reviewed most current radiology test results   YES  Review and summation of old records today    NO  Reviewed patient's current orders and MAR    YES  PMH/ reviewed - no change compared to H&P  ________________________________________________________________________  Care Plan discussed with:    Comments   Patient x    Family      RN x    Care Manager     Consultant                        Multidiciplinary team rounds were held today with , nursing, pharmacist and clinical coordinator. Patient's plan of care was discussed; medications were reviewed and discharge planning was addressed.      ________________________________________________________________________  Total NON critical care TIME:  35   Minutes    Total CRITICAL CARE TIME Spent:   Minutes non procedure based      Comments   >50% of visit spent in counseling and coordination of care     ________________________________________________________________________  Rina Stuart MD     Procedures: see electronic medical records for all procedures/Xrays and details which were not copied into this note but were reviewed prior to creation of Plan. LABS:  I reviewed today's most current labs and imaging studies.   Pertinent labs include:  Recent Labs     11/25/20 0113 11/24/20  1121   WBC 10.1 12.1*   HGB 14.2 15.6   HCT 44.9 49.0*    211     Recent Labs     11/25/20 0113 11/24/20  1121    137   K 4.0 4.6    104   CO2 25 29   * 133*   BUN 15 21*   CREA 0.98 1.13*   CA 9.0 9.0   MG 2.1  --    PHOS 2.8  --    ALB  --  3.1*   TBILI  --  0.5   ALT  --  18       Signed: Magdalena Kaur MD

## 2020-11-25 NOTE — ED NOTES
Bedside shift change report given to Cecilio Arriaga RN (oncoming nurse) by Marcie Ellis RN (offgoing nurse). Report included the following information SBAR, Kardex, ED Summary, [facility] and Recent Results. 8:22 PM: Spoke with Pt's daughter to confirm MRI screening form. 8:37 PM: Faxed MRI form. Sheet hanging on pt chart. 9:04 PM: TRANSFER - OUT REPORT:    Verbal report given to Carla Richard(name) on [facility] [employer]  By Cecilio Arriaga RN being transferred to [facility](unit) for routine progression of care       Report consisted of patients Situation, Background, Assessment and   Recommendations(SBAR). Information from the following report(s) SBAR, Kardex, ED Summary, [facility] and Recent Results was reviewed with the receiving nurse. Lines:   Peripheral IV 11/24/20 Left Antecubital (Active)   Site Assessment Clean, dry, & intact 11/24/20 1118   Phlebitis Assessment 0 11/24/20 1118   Infiltration Assessment 0 11/24/20 1118   Dressing Status Clean, dry, & intact 11/24/20 1118        Opportunity for questions and clarification was provided.       Patient transported with:   [employer]

## 2020-11-25 NOTE — H&P
.                  Hospitalist Admission Note    NAME: Efraín Pierre   :  1945   MRN:  [id number]     Date/Time:  2020 7:15 PM    Patient PCP: Maria Esther Sharma MD  ______________________________________________________________________  Given the patient's current clinical presentation, I have a high level of concern for decompensation if discharged from the emergency department. Complex decision making was performed, which includes reviewing the patient's available past medical records, laboratory results, and x-ray films. My assessment of this patient's clinical condition and my plan of care is as follows. 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 memantine and donepezil    Hypothyroidism  - continue home dose of levothyroxin    CKD III  apears stable      Code Status: DNR/DNI    Surrogate Decision Maker: daughter    DVT Prophylaxis: enoxaparin  GI Prophylaxis: not indicated    Baseline: dementia, ambulatory with assitance      Subjective:   CHIEF COMPLAINT: non responsive    HISTORY OF PRESENT ILLNESS:     Efraín Pierre is a 66 y.o.  female who presents to the hospital via EMS  History taken from daughter over the phone. She explained that she lives with a partner who cares for her, has been suffering from dementia for sometime but has good days and bad days, after a recent UTI she was out of it all the time, which has been treated and she got better and was able to do physical therapy everyday and that she is pretty strong, but she continues to have problems where she does not know where she was sometimes, she could not know who people are. 3 - 4 days ago could not arouse from sleep very well.  She could not get herself up very well. When walking she shuffles her feet. Today she did not look right, was looking straight at the ceiling, was not responding at all, her hands were folded over her abdomen. Her eyes rolled back and she started blinking, her right arm went to the side of her. She became unconscious. And noted she had wet herself. although this has been a problem on and off. For these reasons she was concerned and had requested the rescue squad to bring her to the hospital.    When I saw the patient in the ED she was initially able to recognize that she was in the hospital and that she used to work in admission in the ER (which daughter actually confirmed). And was not able to tell me how she got here. Later she told me she was at home. She had denied any complaints. Reviewed the med list with daughter and she added she received memantin and that she was not on valium. We were asked to admit for work up and evaluation of the above problems.      Past Medical History:   Diagnosis Date    CAD (coronary artery disease)     high cholesterol    Endocrine disease     thyroid    Heart failure (HCC)     MI    Psychiatric disorder     anxiety, depression        Past Surgical History:   Procedure Laterality Date    HX GYN      2 c sections    HX HEENT      tonsilectomy       Social History     Tobacco Use    Smoking status: Current Every Day Smoker     Packs/day: 0.50     Years: 10.00     Pack years: 5.00   Substance Use Topics    Alcohol use: No        Family History   Problem Relation Age of Onset    Hypertension Mother     Diabetes Mother     Emphysema Father     Asthma Daughter      Allergies   Allergen Reactions    Augmentin [Amoxicillin-Pot Clavulanate] Nausea Only     Abdominal pain    Belladonna Alkaloids Swelling     Swelling of tongue    Carbocaine [Mepivacaine] Other (comments)     Chest pain    Cortisone Other (comments)     depression    Decadron [Dexamethasone] Other (comments) Altered mental status    Erythromycin Nausea Only    Meprobamate Swelling     tongue    Paxil [Paroxetine Hcl] Other (comments)     Headache, syncope    Vantin [Cefpodoxime] Unknown (comments)    Vibramycin Calcium Nausea Only    Zoloft [Sertraline] Other (comments)     Headache, syncope        Prior to Admission medications    Medication Sig Start Date End Date Taking? Authorizing Provider   calcium carbonate (OS-MARYLOU) 500 mg calcium (1,250 mg) tablet Take 1 Tab by mouth daily. Provider, Historical   cholecalciferol (VITAMIN D3) 1,000 unit tablet Take 1,000 Units by mouth every evening. Provider, Historical   aspirin/salicylamide/caffeine (BC HEADACHE POWDER PO) Take 0.5 Packets by mouth two (2) times daily as needed for Pain. Provider, Historical   naproxen sodium (ALEVE) 220 mg tablet Take 220 mg by mouth two (2) times daily as needed for Pain. Provider, Historical   amLODIPine (NORVASC) 5 mg tablet Take 5 mg by mouth daily. Provider, Historical   donepezil (ARICEPT) 5 mg tablet Take 5 mg by mouth nightly. Provider, Historical   latanoprost (XALATAN) 0.005 % ophthalmic solution Administer 1 Drop to both eyes nightly. Provider, Historical   lisinopril (PRINIVIL, ZESTRIL) 20 mg tablet Take 20 mg by mouth daily. Provider, Historical   triamterene-hydroCHLOROthiazide (MAXZIDE) 37.5-25 mg per tablet Take 0.5 Tabs by mouth daily. Provider, Historical   diazepam (VALIUM) 5 mg tablet Take 2.5 mg by mouth every evening. 12/8/10   Marleny Velasquez MD   simvastatin (ZOCOR) 40 mg tablet Take 40 mg by mouth every evening. Marleny Velasquez MD   levothyroxine (SYNTHROID) 75 mcg tablet Take 75 mcg by mouth daily (before breakfast). Marleny Velasquez MD       REVIEW OF SYSTEMS:     I am not able to complete the review of systems because:    The patient is intubated and sedated    The patient has altered mental status due to his acute medical problems    The patient has baseline aphasia from prior stroke(s)    The patient has baseline dementia and is not reliable historian    The patient is in acute medical distress and unable to provide information           Total of 12 systems reviewed as follows:       POSITIVE= underlined text  Negative = text not underlined  General:  fever, chills, sweats, generalized weakness, weight loss/gain,      loss of appetite   Eyes:    blurred vision, eye pain, loss of vision, double vision  ENT:    rhinorrhea, pharyngitis   Respiratory:   cough, sputum production, SOB, WETZEL, wheezing, pleuritic pain   Cardiology:   chest pain, palpitations, orthopnea, PND, edema, syncope   Gastrointestinal:  abdominal pain , N/V, diarrhea, dysphagia, constipation, bleeding   Genitourinary:  frequency, urgency, dysuria, hematuria, incontinence   Muskuloskeletal :  arthralgia, myalgia, back pain  Hematology:  easy bruising, nose or gum bleeding, lymphadenopathy   Dermatological: rash, ulceration, pruritis, color change / jaundice  Endocrine:   hot flashes or polydipsia   Neurological:  headache, dizziness, confusion, focal weakness, paresthesia,     Speech difficulties, memory loss, gait difficulty  Psychological: Feelings of anxiety, depression, agitation    Objective:   VITALS:    Visit Vitals  BP (!) 153/81   Pulse (!) 111   Temp 98.3 °F (36.8 °C)   Resp 19   Ht 5' 1\" (1.549 m)   Wt 78.3 kg (172 lb 9.9 oz)   SpO2 92%   BMI 32.62 kg/m²       PHYSICAL EXAM:    General:    Alert, cooperative, no distress, appears stated age. HEENT: Atraumatic, anicteric sclerae, pink conjunctivae     No oral ulcers, mucosa moist, throat clear, dentition fair  Neck:  Supple, symmetrical,  thyroid: non tender  Lungs:   Clear to auscultation bilaterally. No Wheezing or Rhonchi. No rales. Chest wall:  No tenderness  No Accessory muscle use. Heart:   Regular  rhythm,  No  murmur   No edema  Abdomen:   Soft, non-tender. Not distended. Bowel sounds normal  Extremities: No cyanosis.   No clubbing,      Skin turgor normal, Capillary refill normal, Radial dial pulse 2+  Skin:     Not pale. Not Jaundiced  No rashes   Psych:  NO insight. Not depressed. Not anxious or agitated. Neurologic: EOMs intact. No facial asymmetry. No aphasia or slurred speech. Symmetrical strength, Sensation grossly intact. Alert and disoriented/demented.     _______________________________________________________________________  Care Plan discussed with:    Comments   Patient x    Family  x Daughter over the phone   RN x    Care Manager                    Consultant:      _______________________________________________________________________  Expected  Disposition:   Home with Family x   HH/PT/OT/RN    SNF/LTC    MARJORIE    ________________________________________________________________________  TOTAL TIME:  52 Minutes    Critical Care Provided     Minutes non procedure based      Comments    x Reviewed previous records   >50% of visit spent in counseling and coordination of care x Discussion with patient and/or family and questions answered       ________________________________________________________________________  Signed: Farshad Fritz MD    Procedures: see electronic medical records for all procedures/Xrays and details which were not copied into this note but were reviewed prior to creation of Plan.     LAB DATA REVIEWED:    Recent Results (from the past 24 hour(s))   GLUCOSE, POC    Collection Time: 11/24/20 11:06 AM   Result Value Ref Range    Glucose (POC) 125 (H) 65 - 100 mg/dL    Performed by Corinthia Opitz \"Sushant\"    EKG, 12 LEAD, INITIAL    Collection Time: 11/24/20 11:19 AM   Result Value Ref Range    Ventricular Rate 107 BPM    Atrial Rate 107 BPM    P-R Interval 148 ms    QRS Duration 78 ms    Q-T Interval 342 ms    QTC Calculation (Bezet) 456 ms    Calculated P Axis 50 degrees    Calculated R Axis 25 degrees    Calculated T Axis 61 degrees    Diagnosis       Sinus tachycardia  When compared with ECG of 06-JUL-2019 12:51,  No significant change was found     SAMPLES BEING HELD    Collection Time: 11/24/20 11:21 AM   Result Value Ref Range    SAMPLES BEING HELD  BLUE     COMMENT        Add-on orders for these samples will be processed based on acceptable specimen integrity and analyte stability, which may vary by analyte. CBC WITH AUTOMATED DIFF    Collection Time: 11/24/20 11:21 AM   Result Value Ref Range    WBC 12.1 (H) 3.6 - 11.0 K/uL    RBC 5.44 (H) 3.80 - 5.20 M/uL    HGB 15.6 11.5 - 16.0 g/dL    HCT 49.0 (H) 35.0 - 47.0 %    MCV 90.1 80.0 - 99.0 FL    MCH 28.7 26.0 - 34.0 PG    MCHC 31.8 30.0 - 36.5 g/dL    RDW 13.4 11.5 - 14.5 %    PLATELET 221 979 - 666 K/uL    MPV 12.5 8.9 - 12.9 FL    NRBC 0.0 0  WBC    ABSOLUTE NRBC 0.00 0.00 - 0.01 K/uL    NEUTROPHILS 88 (H) 32 - 75 %    LYMPHOCYTES 8 (L) 12 - 49 %    MONOCYTES 4 (L) 5 - 13 %    EOSINOPHILS 0 0 - 7 %    BASOPHILS 0 0 - 1 %    IMMATURE GRANULOCYTES 0 0.0 - 0.5 %    ABS. NEUTROPHILS 10.5 (H) 1.8 - 8.0 K/UL    ABS. LYMPHOCYTES 1.0 0.8 - 3.5 K/UL    ABS. MONOCYTES 0.5 0.0 - 1.0 K/UL    ABS. EOSINOPHILS 0.0 0.0 - 0.4 K/UL    ABS. BASOPHILS 0.1 0.0 - 0.1 K/UL    ABS. IMM. GRANS. 0.1 (H) 0.00 - 0.04 K/UL    DF AUTOMATED     METABOLIC PANEL, COMPREHENSIVE    Collection Time: 11/24/20 11:21 AM   Result Value Ref Range    Sodium 137 136 - 145 mmol/L    Potassium 4.6 3.5 - 5.1 mmol/L    Chloride 104 97 - 108 mmol/L    CO2 29 21 - 32 mmol/L    Anion gap 4 (L) 5 - 15 mmol/L    Glucose 133 (H) 65 - 100 mg/dL    BUN 21 (H) 6 - 20 MG/DL    Creatinine 1.13 (H) 0.55 - 1.02 MG/DL    BUN/Creatinine ratio 19 12 - 20      GFR est AA 57 (L) >60 ml/min/1.73m2    GFR est non-AA 47 (L) >60 ml/min/1.73m2    Calcium 9.0 8.5 - 10.1 MG/DL    Bilirubin, total 0.5 0.2 - 1.0 MG/DL    ALT (SGPT) 18 12 - 78 U/L    AST (SGOT) 11 (L) 15 - 37 U/L    Alk.  phosphatase 130 (H) 45 - 117 U/L    Protein, total 7.3 6.4 - 8.2 g/dL    Albumin 3.1 (L) 3.5 - 5.0 g/dL    Globulin 4.2 (H) 2.0 - 4.0 g/dL    A-G Ratio 0.7 (L) 1.1 - 2.2     TROPONIN I    Collection Time: 11/24/20 11:21 AM   Result Value Ref Range    Troponin-I, Qt. <0.05 <0.05 ng/mL   URINALYSIS W/ REFLEX CULTURE    Collection Time: 11/24/20 12:16 PM    Specimen: Urine   Result Value Ref Range    Color YELLOW/STRAW      Appearance CLEAR CLEAR      Specific gravity 1.019 1.003 - 1.030      pH (UA) 6.0 5.0 - 8.0      Protein Negative NEG mg/dL    Glucose Negative NEG mg/dL    Ketone Negative NEG mg/dL    Bilirubin Negative NEG      Blood Negative NEG      Urobilinogen 0.2 0.2 - 1.0 EU/dL    Nitrites Negative NEG      Leukocyte Esterase Negative NEG      WBC 0-4 0 - 4 /hpf    RBC 0-5 0 - 5 /hpf    Epithelial cells FEW FEW /lpf    Bacteria Negative NEG /hpf    UA:UC IF INDICATED CULTURE NOT INDICATED BY UA RESULT CNI      Hyaline cast 0-2 0 - 5 /lpf   POC LACTIC ACID    Collection Time: 11/24/20 12:17 PM   Result Value Ref Range    Lactic Acid (POC) 1.55 0.40 - 2.00 mmol/L

## 2020-11-25 NOTE — PROGRESS NOTES
Problem: Mobility Impaired (Adult and Pediatric)  Goal: *Acute Goals and Plan of Care (Insert Text)  Description: FUNCTIONAL STATUS PRIOR TO ADMISSION: Patient was independent and active without use of DME. Pt is a questionable historian. HOME SUPPORT PRIOR TO ADMISSION: The patient lived with daughter. Physical Therapy Goals  Initiated 11/25/2020  1. Patient will move from supine to sit and sit to supine  in bed with independence within 7 day(s). 2.  Patient will transfer from bed to chair and chair to bed with independence using the least restrictive device within 7 day(s). 3.  Patient will perform sit to stand with independence within 7 day(s). 4.  Patient will ambulate with independence for 300 feet with the least restrictive device within 7 day(s). Outcome: Progressing Towards Goal     PHYSICAL THERAPY EVALUATION  Patient: Dave Lange (82 y.o. female)  Date: 11/25/2020  Primary Diagnosis: Acute confusion [R41.0]  Acute confusion [R41.0]        Precautions:          ASSESSMENT  Based on the objective data described below, the patient presents with dementia, and close to baseline mobility. Pt was received in supine and cleared by nursing to mobilize. She was able to come to the EOB and ambulated within the room without AD. No LOB noted. She is able to follow commands, but is confused. She was returned to supine to have an EEG performed. Current Level of Function Impacting Discharge (mobility/balance): CGA/supervision    Functional Outcome Measure: The patient scored Total: 75/100 on the Barthel Index which is indicative of 25% impaired ability to care for basic self needs/dependency on others. Other factors to consider for discharge: dementia     Patient will benefit from skilled therapy intervention to address the above noted impairments.        PLAN :  Recommendations and Planned Interventions: bed mobility training, transfer training, gait training, therapeutic exercises, patient and family training/education, and therapeutic activities      Frequency/Duration: Patient will be followed by physical therapy:  3 times a week to address goals. Recommendation for discharge: (in order for the patient to meet his/her long term goals)  To be determined: HH vs none pending hospital stay    This discharge recommendation:  Has not yet been discussed the attending provider and/or case management    IF patient discharges home will need the following DME: none         SUBJECTIVE:   Patient stated I live over there, I own that building.     OBJECTIVE DATA SUMMARY:   HISTORY:    Past Medical History:   Diagnosis Date    CAD (coronary artery disease)     high cholesterol    Endocrine disease     thyroid    Heart failure (HCC)     MI    Psychiatric disorder     anxiety, depression     Past Surgical History:   Procedure Laterality Date    HX GYN      2 c sections    HX HEENT      tonsilectomy       Personal factors and/or comorbidities impacting plan of care: dementia         EXAMINATION/PRESENTATION/DECISION MAKING:   Critical Behavior:  Neurologic State: Alert, Confused  Orientation Level: Oriented to person, Oriented to place, Disoriented to time, Disoriented to situation  Cognition: Decreased command following, Decreased attention/concentration     Hearing: Auditory  Auditory Impairment: None  Skin:  intact  Edema: none  Range Of Motion:  AROM: Within functional limits           PROM: Within functional limits           Strength:    Strength:  Within functional limits                    Tone & Sensation:   Tone: Normal              Sensation: Intact               Coordination:  Coordination: Within functional limits  Vision:      Functional Mobility:  Bed Mobility:  Rolling: Supervision  Supine to Sit: Supervision  Sit to Supine: Supervision  Scooting: Supervision  Transfers:  Sit to Stand: Supervision  Stand to Sit: Supervision                       Balance:   Sitting: Intact  Standing: Impaired  Standing - Static: Good  Standing - Dynamic : Fair  Ambulation/Gait Training:  Distance (ft): 40 Feet (ft)  Assistive Device: Gait belt  Ambulation - Level of Assistance: Contact guard assistance        Gait Abnormalities: Decreased step clearance        Base of Support: Widened     Speed/Brianda: Slow  Step Length: Left shortened;Right shortened       Functional Measure:  Barthel Index:    Bathin  Bladder: 5  Bowels: 10  Groomin  Dressing: 10  Feeding: 10  Mobility: 10  Stairs: 5  Toilet Use: 10  Transfer (Bed to Chair and Back): 10  Total: 75/100       The Barthel ADL Index: Guidelines  1. The index should be used as a record of what a patient does, not as a record of what a patient could do. 2. The main aim is to establish degree of independence from any help, physical or verbal, however minor and for whatever reason. 3. The need for supervision renders the patient not independent. 4. A patient's performance should be established using the best available evidence. Asking the patient, friends/relatives and nurses are the usual sources, but direct observation and common sense are also important. However direct testing is not needed. 5. Usually the patient's performance over the preceding 24-48 hours is important, but occasionally longer periods will be relevant. 6. Middle categories imply that the patient supplies over 50 per cent of the effort. 7. Use of aids to be independent is allowed. David Mitchell., Barthel, D.W. (). Functional evaluation: the Barthel Index. [address] [facility] (14)2. DIONNA Martínez, Osiris Marks., Nela Saab, Nguyen Macias, [address] [place] [address] (). Measuring the change indisability after inpatient rehabilitation; comparison of the responsiveness of the Barthel Index and Functional [place] Measure. Journal of Neurology, Neurosurgery, and Psychiatry, 66(4), 410-728.   Eva Stone, N.J.A, AUGUSTIN Norton.SKYLAR, & Robyn Mota MBertA. (2004.) Assessment of post-stroke quality of life in cost-effectiveness studies: The usefulness of the Barthel Index and the EuroQoL-5D. Quality of Life Research, 15, 161-14        Physical Therapy Evaluation Charge Determination   History Examination Presentation Decision-Making   MEDIUM  Complexity : 1-2 comorbidities / personal factors will impact the outcome/ POC  LOW Complexity : 1-2 Standardized tests and measures addressing body structure, function, activity limitation and / or participation in recreation  LOW Complexity : Stable, uncomplicated  Other outcome measures barthel  LOW       Based on the above components, the patient evaluation is determined to be of the following complexity level: LOW     Pain Ratin/10    Activity Tolerance:   Good    After treatment patient left in no apparent distress:   Supine in bed and Call bell within reach    COMMUNICATION/EDUCATION:   The patients plan of care was discussed with: Occupational therapist and Registered nurse. Patient is unable to participate in goal setting and plan of care.     Thank you for this referral.  Marina Prajapati, PT, DPT   Time Calculation: 21 mins

## 2020-11-25 NOTE — CONSULTS
Neurology Note    Patient ID:  Marylin Swann  [id number]  59 y.o.  1945      Date of Consultation:  November 25, 2020    Referring Physician: Dr. Jo Jones    Reason for Consultation:  encephalopathy    Subjective: non responsive       History of Present Illness:   Marylin Swann is a 66 y.o. female who was brought to the emergency department by EMS. The patient does suffer from dementia and had a recent urinary tract infection which impacted her cognition. She was slowly getting better and doing physical therapy but continued to have difficulty from a cognitive standpoint. Family noticed that she continue to have a neurological decline and difficulty with walking. The patient was then noted to have a staring off spell with intermittent eyes rolling back in her head and blinking. She did have incontinence associated with this. There is no family member at the bedside today to provide additional information. The patient herself denies any symptoms of numbness, tingling, or weakness. She is looking for her daughter and close the put on so she can exercise. It is unclear what her true baseline is but there is mention of her having a underlying dementia.     Past Medical History:   Diagnosis Date    CAD (coronary artery disease)     high cholesterol    Endocrine disease     thyroid    Heart failure (HCC)     MI    Psychiatric disorder     anxiety, depression        Past Surgical History:   Procedure Laterality Date    HX GYN      2 c sections    HX HEENT      tonsilectomy        Family History   Problem Relation Age of Onset    Hypertension Mother     Diabetes Mother     Emphysema Father     Asthma Daughter         Social History     Tobacco Use    Smoking status: Current Every Day Smoker     Packs/day: 0.50     Years: 10.00     Pack years: 5.00   Substance Use Topics    Alcohol use: No        Allergies   Allergen Reactions    Augmentin [Amoxicillin-Pot Clavulanate] Nausea Only     Abdominal pain    Belladonna Alkaloids Swelling     Swelling of tongue    Carbocaine [Mepivacaine] Other (comments)     Chest pain    Cortisone Other (comments)     depression    Decadron [Dexamethasone] Other (comments)     Altered mental status    Erythromycin Nausea Only    Meprobamate Swelling     tongue    Paxil [Paroxetine Hcl] Other (comments)     Headache, syncope    Vantin [Cefpodoxime] Unknown (comments)    Vibramycin Calcium Nausea Only    Zoloft [Sertraline] Other (comments)     Headache, syncope        Prior to Admission medications    Medication Sig Start Date End Date Taking? Authorizing Provider   calcium carbonate (OS-MARYLOU) 500 mg calcium (1,250 mg) tablet Take 1 Tab by mouth daily. Provider, Historical   cholecalciferol (VITAMIN D3) 1,000 unit tablet Take 1,000 Units by mouth every evening. Provider, Historical   aspirin/salicylamide/caffeine (BC HEADACHE POWDER PO) Take 0.5 Packets by mouth two (2) times daily as needed for Pain. Provider, Historical   naproxen sodium (ALEVE) 220 mg tablet Take 220 mg by mouth two (2) times daily as needed for Pain. Provider, Historical   amLODIPine (NORVASC) 5 mg tablet Take 5 mg by mouth daily. Provider, Historical   donepezil (ARICEPT) 5 mg tablet Take 5 mg by mouth nightly. Provider, Historical   latanoprost (XALATAN) 0.005 % ophthalmic solution Administer 1 Drop to both eyes nightly. Provider, Historical   lisinopril (PRINIVIL, ZESTRIL) 20 mg tablet Take 20 mg by mouth daily. Provider, Historical   triamterene-hydroCHLOROthiazide (MAXZIDE) 37.5-25 mg per tablet Take 0.5 Tabs by mouth daily. Provider, Historical   diazepam (VALIUM) 5 mg tablet Take 2.5 mg by mouth every evening. 12/8/10   Marleny Velasquez MD   simvastatin (ZOCOR) 40 mg tablet Take 40 mg by mouth every evening. Marleny Velasquez MD   levothyroxine (SYNTHROID) 75 mcg tablet Take 75 mcg by mouth daily (before breakfast).     Marleny Velasquez MD     Current Facility-Administered Medications   Medication Dose Route Frequency    sodium chloride (NS) flush 5-10 mL  5-10 mL IntraVENous PRN    sodium chloride (NS) flush 5-40 mL  5-40 mL IntraVENous Q8H    sodium chloride (NS) flush 5-40 mL  5-40 mL IntraVENous PRN    amLODIPine (NORVASC) tablet 5 mg  5 mg Oral DAILY    donepeziL (ARICEPT) tablet 5 mg  5 mg Oral QHS    levothyroxine (SYNTHROID) tablet 100 mcg  100 mcg Oral 6am    lisinopriL (PRINIVIL, ZESTRIL) tablet 20 mg  20 mg Oral DAILY    atorvastatin (LIPITOR) tablet 20 mg  20 mg Oral DAILY    memantine (NAMENDA) tablet 10 mg  10 mg Oral DAILY    aspirin chewable tablet 81 mg  81 mg Oral DAILY    acetaminophen (TYLENOL) tablet 650 mg  650 mg Oral Q4H PRN    Or    acetaminophen (TYLENOL) solution 650 mg  650 mg Per NG tube Q4H PRN    Or    acetaminophen (TYLENOL) suppository 650 mg  650 mg Rectal Q4H PRN    enoxaparin (LOVENOX) injection 40 mg  40 mg SubCUTAneous Q24H     Review of Systems:    General, constitutional: negative  Eyes, vision: negative  Ears, nose, throat: negative  Cardiovascular, heart: negative  Respiratory: negative  Gastrointestinal: negative  Genitourinary: negative  Musculoskeletal: negative  Skin and integumentary: negative  Psychiatric: negative  Endocrine: negative  Neurological: negative, except for HPI  Hematologic/lymphatic: negative  Allergy/immunology: negative    Objective:     Visit Vitals  BP (!) 159/67   Pulse 77   Temp 98.2 °F (36.8 °C)   Resp 18   Ht 5' 1\" (1.549 m)   Wt 172 lb 9.9 oz (78.3 kg)   SpO2 98%   BMI 32.62 kg/m²       Physical Exam:      General:  appears well nourished in no acute distress  Neck: no carotid bruits  Lungs: clear to auscultation  Heart:  no murmurs, regular rate  Lower extremity: peripheral pulses palpable and no edema  Skin: intact    Neurological exam:    The patient is awake and alert. She is oriented to her location.   She does not know the name of the hospital.  She did not name the correct year but did name the correct month. She was able to perform simple calculations. She did name objects correctly. She has decreased insight into her medical condition. She has better remote memory than recent memory. Her attention and concentration were limited. There was no dysarthria or aphasia. Cranial nerves:   II-XII were tested    Perrrla  Fundoscopic examination revealed venous pulsations and no clear abnormalities  Visual fields were full  Eomi, no evidence of nystagmus  Facial sensation:  normal and symmetric  Facial motor: normal and symmetric  Hearing intact  SCM strength intact  Tongue: midline without fasciculations    Motor: Tone normal  Pronator drift was absent    No evidence of fasciculations    Strength testing:  Strength appeared to be 5 out of 5 throughout. There is no focal weakness. Sensory:  Upper extremity: intact to pp  Lower extremity: intact to pp    Reflexes:    Right Left  Biceps  1 1  Triceps 1 1  Brachiorad. 1 1  Patella  1 1  Achilles 1 1    Plantar response:  flexor bilaterally      Cerebellar testing:  no tremor apparent, finger/nose and fredrick were intact    Gait was not assessed due to concerns over safety    Labs:     Lab Results   Component Value Date/Time    Hemoglobin A1c 5.7 12/25/2010 02:40 AM    Sodium 138 11/25/2020 01:13 AM    Potassium 4.0 11/25/2020 01:13 AM    Chloride 107 11/25/2020 01:13 AM    Glucose 108 (H) 11/25/2020 01:13 AM    BUN 15 11/25/2020 01:13 AM    Creatinine 0.98 11/25/2020 01:13 AM    Calcium 9.0 11/25/2020 01:13 AM    WBC 10.1 11/25/2020 01:13 AM    HCT 44.9 11/25/2020 01:13 AM    HGB 14.2 11/25/2020 01:13 AM    PLATELET 125 47/67/9454 01:13 AM       Imaging:    Results from Hospital Encounter encounter on 12/21/10   MRA NECK WITH CONTRAST    Narrative        ICD Codes / Adm. Diagnosis: 276.1  276.8 / HYPONATREMIA  HYPOKALEMIA  Examination:  NECK MRA W CON  - [id number] - Dec 26 2010  1:18PM  Accession No:  [id number]  Reason: stroke      REPORT:  TECHNIQUE:    Contrast enhanced coronal acquisition obtained. Multiplanar 3D   reconstructions performed. 20 cc IV Magnevist.    FINDINGS:  Brachiocephalic artery origins show no stenosis. The bilateral subclavian,   common carotid, and internal carotid arteries are patent, with mild   atherosclerotic irregularity, with no flow-limiting stenosis. Right carotid artery stenosis: 10 %   Left  carotid artery stenosis: 10 %    NASCET criteria were utilized for calculating stenosis. The vertebral arteries are patent, relatively codominant, with mild origins   stenoses. IMPRESSION:  Minor atherosclerotic disease. No significant carotid stenosis. Patent vertebrals. Interpreting/Reading Doctor: Delaney Corral ([id number])  Transcribed:  on 12/26/2010  Approved: Delaney Corral ([id number])  12/26/2010             Distribution:  Attending Doctor: Cirilo Aguilar Doctor: Walt Benjamin            Results from [facility] Encounter encounter on 11/24/20   CT HEAD WO CONT    Narrative EXAM: CT HEAD WO CONT    INDICATION: Nonverbal and difficulty following commands since last night. Baseline dementia. Recent UTI. COMPARISON: CT head on 7/6/2019. MRI brain on 12/23/2010. TECHNIQUE: Noncontrast head CT. Coronal and sagittal reformats. CT dose  reduction was achieved through the use of a standardized protocol tailored for  this examination and automatic exposure control for dose modulation. FINDINGS: The ventricles and sulci are age-appropriate without hydrocephalus. There is no mass effect or midline shift. There is no intracranial hemorrhage or  extra-axial fluid collection. Chronic microvascular ischemic disease is  unchanged in greatest in the bilateral frontal and parietal periventricular  white matter. No CT evidence of acute infarct    The calvarium is intact. The visualized paranasal sinuses and mastoid air cells  are clear.       Impression IMPRESSION: Chronic microvascular ischemic disease. No change. No acute process on  noncontrast CT head. I Did independently review the head CT from 11/24/2020. There was chronic microvascular ischemic changes and a significant amount of atrophy. I did independently review the brain MRI from 11/25/2020. There was a significant amount of atrophy and chronic small vessel ischemic changes. There was no acute stroke. LDL 87  Hemoglobin A1c 6.3  Vitamin B12 751  TSH 1.64  Hemoglobin A1c 6.3  Vitamin B12 751    Assessment and Plan:    The patient is a pleasant 73-year-old female with history of prior stroke and dementia who presents with an episode of unresponsiveness. Her neurological examination today does reveal cognitive impairment no focal weakness. Acute loss of consciousness: The differential for this does include TIA versus convulsive cardiogenic syncope versus a seizure. The brain MRI revealed no evidence of an acute stroke  Her EEG revealed no focality for seizures. I would hold on anticonvulsant medications at this time    Risk of vascular disease:    She does have risk factors for stroke including hypertension and dyslipidemia  Continue aspirin daily  Hypertension: Goal systolic blood pressure under 140  Dyslipidemia: Continue current statin therapy    Consider obtaining carotid Doppler study and echocardiogram    Dementia:  Continue Aricept and Namenda      The patient should be seen in neurology clinic for follow-up in 3 to 4 weeks time. If another event occurs, I would then consider a prolonged or 24-hour EEG.              Active Problems:    Acute confusion (11/24/2020)                   Signed By:  Elias Ott DO FAAN    November 25, 2020

## 2020-11-25 NOTE — PROGRESS NOTES
Problem: Self Care Deficits Care Plan (Adult)  Goal: *Acute Goals and Plan of Care (Insert Text)  Description:   FUNCTIONAL STATUS PRIOR TO ADMISSION: pt reports that she did not need assistance at home     HOME SUPPORT: The patient lived with her daughter. Occupational Therapy Goals  Initiated 11/25/2020  1. Patient will perform grooming in standing, including set up with independence within 7 day(s). 2.  Patient will perform sponge bathing with distant supervision/set-up within 7 day(s). 3.  Patient will perform simple home management with supervision/set-up within 7 day(s). 4.  Patient will perform toilet transfers with independence within 7 day(s). 5.  Patient will perform all aspects of toileting with independence within 7 day(s). 6.  Patient will tolerate standing adls for at least 6 minutes within 7 days     Outcome: Not Met   OCCUPATIONAL THERAPY EVALUATION  Patient: Maia Conley (77 y.o. female)  Date: 11/25/2020  Primary Diagnosis: Acute confusion [R41.0]  Acute confusion [R41.0]        Precautions: fall       ASSESSMENT  Based on the objective data described below, the patient presents with history of dementia, pleasant confusion, and generally supervision/stand by assistance for adls at this time which is slightly below her adl baseline. Pt will benefit from acute OT services and may benefit from Home [facility] safety Evaluation and OT services at discharge. .    Current Level of Function Impacting Discharge (ADLs/self-care): supervision to stand by assistance    Functional Outcome Measure: The patient scored Total: 75/100 on the Barthel Index outcome measure which is indicative of 25% impaired ability to care for basic self needs/dependency on others; inferred  dependency on others for instrumental ADLs. Other factors to consider for discharge: will need 24 hour assistance at home at discharge.        Patient will benefit from skilled therapy intervention to address the above noted impairments. PLAN :  Recommendations and Planned Interventions: self care training, functional mobility training, therapeutic exercise, balance training, therapeutic activities, endurance activities, patient education, and home safety training    Frequency/Duration: Patient will be followed by occupational therapy 3 times a week to address goals. Recommendation for discharge: (in order for the patient to meet his/her long term goals)  Occupational therapy at least 2 days/week in the home AND ensure assist and/or supervision for safety during adls and adl mobility    This discharge recommendation:  Has not yet been discussed the attending provider and/or case management    IF patient discharges home will need the following DME: tbd       SUBJECTIVE:   Patient was pleasantly confused and cooperative throughout tx sessio       OBJECTIVE DATA SUMMARY:   HISTORY:   Past Medical History:   Diagnosis Date    CAD (coronary artery disease)     high cholesterol    Endocrine disease     thyroid    Heart failure ([facility] [address]     MI    Psychiatric disorder     anxiety, depression     Past Surgical History:   Procedure Laterality Date    HX GYN      2 c sections    HX HEENT      tonsilectomy       Expanded or extensive additional review of patient history:            EXAMINATION OF PERFORMANCE DEFICITS:  Cognitive/Behavioral Status:  Neurologic State: Alert;Confused  Orientation Level: Oriented to person  Cognition: Decreased attention/concentration;Decreased command following; Impaired decision making  Perception: Appears intact(decreased searching behaviors)     Safety/Judgement: Decreased awareness of environment;Decreased awareness of need for assistance;Decreased awareness of need for safety;Decreased insight into deficits; Fall prevention    Skin: generally intact, appears dry    Edema: none observed     Hearing:   Auditory  Auditory Impairment: None    Vision/Perceptual:                                Corrective Lenses: (none present)    Range of Motion:  BUEs:   AROM: Within functional limits  PROM: Within functional limits                      Strength:  BUEs:    Strength: Within functional limits                Coordination:  Coordination: Within functional limits  Fine Motor Skills-Upper: Left Intact; Right Intact    Gross Motor Skills-Upper: Left Intact; Right Intact    Tone & Sensation:    Tone: Normal  Sensation: Intact                      Balance:  Sitting: Intact  Standing: Impaired  Standing - Static: Good  Standing - Dynamic : Fair    Functional Mobility and Transfers for ADLs:  Bed Mobility:  Rolling: Supervision  Supine to Sit: Supervision  Sit to Supine: Supervision  Scooting: Supervision    Transfers:  Sit to Stand: Supervision  Stand to Sit: Supervision  Bathroom Mobility: Supervision/set up;Stand-by assistance  Toilet Transfer : Supervision;Stand-by assistance    ADL Assessment:  Feeding: Independent    Oral Facial Hygiene/Grooming: Setup    Bathing: Supervision    Upper Body Dressing: Setup    Lower Body Dressing: Supervision;Stand-by assistance    Toileting: Supervision                ADL Intervention and task modifications:                                     Cognitive Retraining  Safety/Judgement: Decreased awareness of environment;Decreased awareness of need for assistance;Decreased awareness of need for safety;Decreased insight into deficits; Fall prevention         Functional Measure:  Barthel Index:    Bathin  Bladder: 5  Bowels: 10  Groomin  Dressing: 10  Feeding: 10  Mobility: 10  Stairs: 5  Toilet Use: 10  Transfer (Bed to Chair and Back): 10  Total: 75/100        The Barthel ADL Index: Guidelines  1. The index should be used as a record of what a patient does, not as a record of what a patient could do. 2. The main aim is to establish degree of independence from any help, physical or verbal, however minor and for whatever reason.   3. The need for supervision renders the patient not independent. 4. A patient's performance should be established using the best available evidence. Asking the patient, friends/relatives and nurses are the usual sources, but direct observation and common sense are also important. However direct testing is not needed. 5. Usually the patient's performance over the preceding 24-48 hours is important, but occasionally longer periods will be relevant. 6. Middle categories imply that the patient supplies over 50 per cent of the effort. 7. Use of aids to be independent is allowed. Patricia Lyons., Barthel, NILS. (7231). Functional evaluation: the Barthel Index. [facility] (14)2. Mar Khadijah nando Jed, OKSANAF, Garth Crimes., Moo Founds., [facility], [address] [place] [address] (1999). Measuring the change indisability after inpatient rehabilitation; comparison of the responsiveness of the Barthel Index and Functional [place] Measure. Journal of Neurology, Neurosurgery, and Psychiatry, 66(4), 611-039. Jaycee Martinez, [place].LENO, ARNULFO Norton, & Zuly Miller MJERAMIE. (2004.) Assessment of post-stroke quality of life in cost-effectiveness studies: The usefulness of the Barthel Index and the EuroQoL-5D. Quality of Life Research, 15, 490-41         Occupational Therapy Evaluation Charge Determination   History Examination Decision-Making   MEDIUM Complexity : Expanded review of history including physical, cognitive and psychosocial  history  MEDIUM Complexity : 3-5 performance deficits relating to physical, cognitive , or psychosocial skils that result in activity limitations and / or participation restrictions MEDIUM Complexity : Patient may present with comorbidities that affect occupational performnce.  Miniml to moderate modification of tasks or assistance (eg, physical or verbal ) with assesment(s) is necessary to enable patient to complete evaluation       Based on the above components, the patient evaluation is determined to be of the following complexity level: MEDIUM  Pain Rating:  No complaints of pain    Activity Tolerance:   Good  No complaints during adls/Mobility  After treatment patient left in no apparent distress:    Supine in bed, Call bell within reach, Side rails x 3, and EEG technician about to initiate testing    COMMUNICATION/EDUCATION:   The patients plan of care was discussed with: Physical therapist and Registered nurse. Patient is unable to participate in goal setting and plan of care. This patients plan of care is appropriate for delegation to [facility].     Thank you for this referral.  Yonathan Sy OTR/L  Time Calculation: 24 mins

## 2020-11-25 NOTE — PROGRESS NOTES
Pharmacy Medication Reconciliation      Allergy Update: Augmentin [amoxicillin-pot clavulanate]; Belladonna alkaloids; Carbocaine [mepivacaine]; Cortisone; Decadron [dexamethasone]; Erythromycin; Meprobamate; Paxil [paroxetine hcl]; Vantin [cefpodoxime]; Vibramycin calcium; and Zoloft [sertraline]    Recommendations/Findings: The following amendments were made to the patient's active medication list on file at [address]:   1) Additions:        - Memantine       - Calcium and vitamin D chew       - Vitamin C chew    2) Deletions:         - Os-Paul        - Vitamin D        - Diazepam        - Maxzide    3) Changes:         - Levothyroxine: 100 mcg daily      Pertinent Findings: None    -Clarified PTA med list with Rx Query and patient's daughter. PTA medication list was corrected to the following:     Prior to Admission Medications   Prescriptions Last Dose Informant Taking? amLODIPine (NORVASC) 5 mg tablet  Child No   Sig: Take 5 mg by mouth daily. ascorbic acid, vitamin C, (Vitamin C) 500 mg chew   Yes   Sig: Take 500 mg by mouth daily. aspirin/salicylamide/caffeine (BC HEADACHE POWDER PO)  Child No   Sig: Take 0.5 Packets by mouth two (2) times daily as needed for Pain. calcium-cholecalciferol, d3, 500 mg(1,250mg) -400 unit chew   Yes   Sig: Take 2 Tabs by mouth daily. donepezil (ARICEPT) 5 mg tablet  Child No   Sig: Take 5 mg by mouth nightly. latanoprost (XALATAN) 0.005 % ophthalmic solution  Child No   Sig: Administer 1 Drop to both eyes nightly. levothyroxine (SYNTHROID) 100 mcg tablet   Yes   Sig: Take 100 mcg by mouth Daily (before breakfast). lisinopril (PRINIVIL, ZESTRIL) 20 mg tablet  Child No   Sig: Take 20 mg by mouth daily. memantine ER (NAMENDA XR) 14 mg capsule   Yes   Sig: Take 14 mg by mouth daily.    naproxen sodium (ALEVE) 220 mg tablet  Child No   Sig: Take 220 mg by mouth two (2) times daily as needed for Pain.   simvastatin (ZOCOR) 40 mg tablet  Child No   Sig: Take 40 mg by mouth every evening.       Facility-Administered Medications: None          Thank you,  Autumn Lakhani, PHARMD

## 2020-11-25 NOTE — ACP (ADVANCE CARE PLANNING)
.                                           Advance Care Planning Note      NAME: Sam Knott   :  1945   MRN:  [id number]     Date/Time:  2020 2:30 AM    Active Diagnoses:  Hospital Problems  Date Reviewed: 2010          Codes Class Noted POA    Acute confusion ICD-10-CM: R41.0  ICD-9-CM: 293.0  2020 Unknown              These active diagnoses are of sufficient risk that focused discussion on advance care planning is indicated in order to allow the patient to thoughtfully consider personal goals of care, and if situations arise that prevent the ability to personally give input, to ensure appropriate representation of their personal desires for different levels and aggressiveness of care. Discussion:   Code status addressed with her next of kind and medical power of  Ms. Denisse Lunsford and she expressed that Ms. Bree Wei had informed her previously that if her dementia progresses she would with to have no CPR done and wants to be a DNR / DNI. Patient would NOT want central line and vasopressors if needed. Patient would also NOT want a feeding tube, if needed, for nutritional support. Patient  has assigned  daughter Denisse Lunsford  as the surrogate decision maker. Persons present and participating in discussion:  Maude Singh MD, discussion conducted over the phone with Ms. Denisse Lunsford      Time Spent:   Total time spent face-to-face in education and discussion:   17  minutes.          Maude Singh MD   Hospitalist

## 2020-11-25 NOTE — PROCEDURES
EEG REPORT    Patient Name: Jonathan Garay  : 1945  Age: 66 y.o. Ordering physician: Dr. Peola Simmonds  Date of EE2020  11:31 - 11:53  Diagnosis: loss of consciousness  Interpreting physician: Mesfin Fox D.O. FAAN    Procedure: EEG    CLINICAL INDICATION: The patient is a 66 y.o. female who is being evaluated for baseline electro cerebral activities and to rule out seizure focus. Current Facility-Administered Medications   Medication Dose Route Frequency    sodium chloride (NS) flush 5-10 mL  5-10 mL IntraVENous PRN    sodium chloride (NS) flush 5-40 mL  5-40 mL IntraVENous Q8H    sodium chloride (NS) flush 5-40 mL  5-40 mL IntraVENous PRN    amLODIPine (NORVASC) tablet 5 mg  5 mg Oral DAILY    donepeziL (ARICEPT) tablet 5 mg  5 mg Oral QHS    levothyroxine (SYNTHROID) tablet 100 mcg  100 mcg Oral 6am    lisinopriL (PRINIVIL, ZESTRIL) tablet 20 mg  20 mg Oral DAILY    atorvastatin (LIPITOR) tablet 20 mg  20 mg Oral DAILY    memantine (NAMENDA) tablet 10 mg  10 mg Oral DAILY    aspirin chewable tablet 81 mg  81 mg Oral DAILY    acetaminophen (TYLENOL) tablet 650 mg  650 mg Oral Q4H PRN    Or    acetaminophen (TYLENOL) solution 650 mg  650 mg Per NG tube Q4H PRN    Or    acetaminophen (TYLENOL) suppository 650 mg  650 mg Rectal Q4H PRN    enoxaparin (LOVENOX) injection 40 mg  40 mg SubCUTAneous Q24H           DESCRIPTION OF PROCEDURE:     This is a digitally recorded electroencephalogram  Electrodes were applied in accordance with the international 10-20 system of electrode placement. 18 channels of scalp EEG are recorded  A channel was used for EoG  Another channel was used for ECG   The data is stored digitally and reviewed in reformatted montages for optimal display  EEG  was reviewed in both bipolar and referential montages    Description of Activity:     The background of this recording contains a posteriorly-located occipital alpha rhythm of 9-10 hz that attenuates with eye opening. This was seen maximally over the posterior head region and was symmetric. There was a small amount of beta activity seen. Throughout the recording, there were no clear areas of focal slowing nor spike or spike-and-wave discharges seen. Hyperventilation was not performed. Photic stimulation produced no response in the posterior head regions. During drowsiness, there is attenuation of the underlying  frequency and slower theta activity occurs. During the recording, the patient did not achieve stage II sleep        Clinical Interpretation: This EEG, performed during wakefulness and drowsiness, is normal.  There was no clear focal abnormalities or epileptiform activity. A normal EEG doesn't not rule out seizures. Clinical correlation recommended. Marshall Fox D.O.   Amandeep Costa

## 2020-11-25 NOTE — PROGRESS NOTES
SPEECH PATHOLOGY BEDSIDE SWALLOW EVALUATION/DISCHARGE  Patient: Dedra Astorga (19 y.o. female)  Date: 11/25/2020  Primary Diagnosis: Acute confusion [R41.0]  Acute confusion [R41.0]       Precautions:        ASSESSMENT :  Based on the objective data described below, the patient presents with no oral or pharyngeal dysphagia, and no overt s/s aspiration observed. Skilled acute therapy provided by a speech-language pathologist is not indicated at this time. PLAN :  Recommendations:  -Continue regular/thin liquid diet, upright for all PO intake  Discharge Recommendations: None     SUBJECTIVE:   Patient stated I don't know.  when asked why she is in the hospital. Patient oriented to person and place only. Note patient with dementia at baseline. OBJECTIVE:     Past Medical History:   Diagnosis Date    CAD (coronary artery disease)     high cholesterol    Endocrine disease     thyroid    Heart failure (HCC)     MI    Psychiatric disorder     anxiety, depression     Past Surgical History:   Procedure Laterality Date    HX GYN      2 c sections    HX HEENT      tonsilectomy     Prior Level of Function/Home Situation:      Diet prior to admission: unknown  Current Diet:  Regular/thin   Cognitive and Communication Status:  Neurologic State: Alert, Confused  Orientation Level: Oriented to person, Oriented to place, Disoriented to time, Disoriented to situation  Cognition: Decreased command following, Decreased attention/concentration           Oral Assessment:  Oral Assessment  Labial: Left droop  Dentition: Limited  Oral Hygiene: moist oral mucosa  Lingual: No impairment  Velum: Unable to visualize  Mandible: No impairment  P.O. Trials:  Patient Position: upright in bed  Vocal quality prior to P.O.: No impairment  Consistency Presented: Thin liquid; Solid; Other (comment)(breakfast tray)  How Presented: Self-fed/presented;Straw;Successive swallows;Spoon     Bolus Acceptance: No impairment  Bolus Formation/Control: No impairment     Propulsion: No impairment  Oral Residue: None  Initiation of Swallow: No impairment  Laryngeal Elevation: Functional  Aspiration Signs/Symptoms: None  Pharyngeal Phase Characteristics: No impairment, issues, or problems   Effective Modifications: None  Cues for Modifications: None       Oral Phase Severity: No impairment  Pharyngeal Phase Severity : No impairment  NOMS:   The NOMS functional outcome measure was used to quantify this patient's level of swallowing impairment. Based on the NOMS, the patient was determined to be at level 7 for swallow function     NOMS Swallowing Levels:  Level 1 (CN): NPO  Level 2 (CM): NPO but takes consistency in therapy  Level 3 (CL): Takes less than 50% of nutrition p.o. and continues with nonoral feedings; and/or safe with mod cues; and/or max diet restriction  Level 4 (CK): Safe swallow but needs mod cues; and/or mod diet restriction; and/or still requires some nonoral feeding/supplements  Level 5 (CJ): Safe swallow with min diet restriction; and/or needs min cues  Level 6 (CI): Independent with p.o.; rare cues; usually self cues; may need to avoid some foods or needs extra time  Level 7 ([address] [place] [address]): Independent for all p.o.  BRYN. (2003). National Outcomes Measurement System (NOMS): Adult Speech-Language Pathology User's Guide. Pain:  Pain Scale 1: Numeric (0 - 10)  Pain Intensity 1: 0     After treatment:   Patient left in no apparent distress in bed, Call bell within reach and Nursing notified    COMMUNICATION/EDUCATION:   The patient's plan of care including recommendations, planned interventions, and recommended diet changes were discussed with: Registered nurse.      Thank you for this referral.  JEOVANNY Trinh  Time Calculation: 10 mins

## 2020-11-26 ENCOUNTER — HOME HEALTH ADMISSION (OUTPATIENT)
Dept: HOME HEALTH SERVICES | Facility: HOME HEALTH | Age: 75
End: 2020-11-26

## 2020-11-26 PROCEDURE — 74011250637 HC RX REV CODE- 250/637: Performed by: PSYCHIATRY & NEUROLOGY

## 2020-11-26 PROCEDURE — 99233 SBSQ HOSP IP/OBS HIGH 50: CPT | Performed by: PSYCHIATRY & NEUROLOGY

## 2020-11-26 PROCEDURE — 65660000000 HC RM CCU STEPDOWN

## 2020-11-26 PROCEDURE — 74011250637 HC RX REV CODE- 250/637: Performed by: STUDENT IN AN ORGANIZED HEALTH CARE EDUCATION/TRAINING PROGRAM

## 2020-11-26 PROCEDURE — 74011250636 HC RX REV CODE- 250/636: Performed by: STUDENT IN AN ORGANIZED HEALTH CARE EDUCATION/TRAINING PROGRAM

## 2020-11-26 RX ADMIN — ATORVASTATIN CALCIUM 20 MG: 20 TABLET, FILM COATED ORAL at 09:45

## 2020-11-26 RX ADMIN — LEVETIRACETAM 500 MG: 500 TABLET ORAL at 17:02

## 2020-11-26 RX ADMIN — Medication 10 ML: at 13:30

## 2020-11-26 RX ADMIN — ENOXAPARIN SODIUM 40 MG: 40 INJECTION SUBCUTANEOUS at 06:00

## 2020-11-26 RX ADMIN — AMLODIPINE BESYLATE 5 MG: 5 TABLET ORAL at 09:46

## 2020-11-26 RX ADMIN — MEMANTINE HYDROCHLORIDE 10 MG: 10 TABLET ORAL at 09:45

## 2020-11-26 RX ADMIN — LISINOPRIL 20 MG: 20 TABLET ORAL at 09:45

## 2020-11-26 RX ADMIN — LEVOTHYROXINE SODIUM 100 MCG: 0.1 TABLET ORAL at 06:11

## 2020-11-26 RX ADMIN — ASPIRIN 81 MG CHEWABLE TABLET 81 MG: 81 TABLET CHEWABLE at 09:44

## 2020-11-26 RX ADMIN — LEVETIRACETAM 500 MG: 500 TABLET ORAL at 09:45

## 2020-11-26 RX ADMIN — Medication 10 ML: at 06:11

## 2020-11-26 NOTE — PROGRESS NOTES
0700  Received report from SERGIO Jimenez. Report included DAVE, Balbir, MAR. Assumed care of patient. Patient taken to MRI via transport and stretcher.   
 
Bob Chahal

## 2020-11-26 NOTE — PROGRESS NOTES
Problem: Falls - Risk of  Goal: *Absence of Falls  Description: Document Elisa Say Fall Risk and appropriate interventions in the flowsheet. Outcome: Progressing Towards Goal  Note: Fall Risk Interventions:  Mobility Interventions: Assess mobility with egress test    Mentation Interventions: Bed/chair exit alarm, Adequate sleep, hydration, pain control    Medication Interventions: Bed/chair exit alarm, Patient to call before getting OOB, Teach patient to arise slowly    Elimination Interventions: Call light in reach, Bed/chair exit alarm              Problem: Pressure Injury - Risk of  Goal: *Prevention of pressure injury  Description: Document Korey Scale and appropriate interventions in the flowsheet.   Outcome: Progressing Towards Goal  Note: Pressure Injury Interventions:  Sensory Interventions: Assess changes in LOC         Activity Interventions: Assess need for specialty bed    Mobility Interventions: Assess need for specialty bed    Nutrition Interventions: Document food/fluid/supplement intake, Offer support with meals,snacks and hydration    Friction and Shear Interventions: Apply protective barrier, creams and emollients

## 2020-11-26 NOTE — PROGRESS NOTES
End of Shift Note    Bedside shift change report given to Samm Harris. (oncoming nurse) by Dong Andino RN (offgoing nurse). Report included the following information SBAR and Kardex    Shift worked:  [id number] [address] [place] summary and any significant changes:     1910: Received report from [place]. Patient alert and oriented at baseline to self. Received report on patient and patient appeared zoned out long-term through report with blank stare. Patient was not able to track finger with eyes and was unresponsive with eyes open. RRT called for suspected focal seizure. Patient unresponsive with blank state for 60-90 seconds. Following episode, patient returned to normal state alert and oriented to self. Neurologist Dr. Linda Covarrubias called to inform of patient's neurological changes. Received verbal orders to begin patient on Keppra 500 mg bid. Concerns for physician to address:  none. Zone phone for oncoming shift:          Activity:  Activity Level: Up with Assistance  Number times ambulated in hallways past shift: 0  Number of times OOB to chair past shift: 0    Cardiac:   Cardiac Monitoring: Yes      Cardiac Rhythm: Normal sinus rhythm    Access:   Current line(s): PIV     Genitourinary:   Urinary status: voiding    Respiratory:   O2 Device: Room air  Chronic home O2 use?: NO  Incentive spirometer at bedside: NO     GI:  Last Bowel Movement Date: 11/25/20  Current diet:  DIET CARDIAC Regular  Passing flatus: YES  Tolerating current diet: YES       Pain Management:   Patient states pain is manageable on current regimen: YES    Skin:  Korey Score: 17  Interventions: increase time out of bed    Patient Safety:  Fall Score:  Total Score: 4  Interventions: bed/chair alarm  High Fall Risk: Yes    Length of Stay:  Expected LOS: 2d 14h  Actual LOS: 413 Jodee Shetty, RN

## 2020-11-26 NOTE — PROGRESS NOTES
Rapid response was called as patient was noted to have estimated 1 minute of staring spell. When I arrived patient was back to her normal.  She is alert to her name and date of birth which is her baseline. No dysarthria or facial weakness noticed.   Cranial nerves II to XII are grossly intact  Strength is intact in all extremities, no sensory deficit    According to the nurse her mentation is back to her normal self  She is being evaluated for confusional state or possible seizure  MRI done today reviewed which is unremarkable for any acute infarct  Reviewed EEG done today which showed no evidence of epileptiform discharge    Continue to monitor for now as patient is at her baseline

## 2020-11-26 NOTE — PROGRESS NOTES
Reason for Admission:   Acute confusion                  RUR Score:     Low risk for readmission             PCP: First and Last name:  Dr. Sanjay Townsend    Name of Practice:    Are you a current patient: Yes/No: Yes   Approximate date of last visit: 2-3 weeks ago   Can you participate in a virtual visit if needed: No    Do you (patient/family) have any concerns for transition/discharge? None              Plan for utilizing home health:   Pt has had home health in the past. Pt's daughter is receptive to pt utilizing home health at d/c. Current Advanced Directive/Advance Care Plan:  Pt is DNR code status. Pt does not have an ACP on file. CM addressed with pt's daughter about ACP. Pt's daughter stated she is MPOA and has documentation. Pt's daughter stated taht she will provide documentation. Pt's daughter stated that she provided documentation at the beginning of the year from a past hospitalization of pt. Transition of Care Plan:        Home with Home Health   2nd IM Letter  Follow-up Appointments    CM called pt's daughter via phone to discuss d/c plan and complete initial assessment. Pt is confused and had dementia. CM verified pt's demographics, insurance and PCP. Pt is a 67 y/o  female admitted to [address] [facility] on acute confusion. Pt's PCP is Dr. Sanjay Townsend. Pt sees PCP every 2-3 months. Pt uses [facility] in and [facility] in [facility] for Rx. Pt resides in an one level home with 3 [place]OTTA. Pt's daughter reported  that she provides care during the day for pt and pt's  provides care during the evening/night. Pt needs assistance with ADL's and IADL's. Pt does not drive. No DME's. Pt has had home health in the past. No SNF or IPR in the past. Pt is DNR code status. Pt does not have an ACP on file. Pt's daughter Genny Knight will transport at d/c.     CM discussed with pt's daughter about home health. CM reviewed with pt's daughter [facility].  Pt's daughter selected [facility]. [address] document signed by pt and placed in pt's bedside chart. Referral was sent to [facility] via [facility]. CM discuss with pt's daughter about pt's Medicare rights and their right to appeal the discharge. Pt's daughter understood pt's Medicare rights. Pt unable to sign. Pt's daughter provided verbal understanding and agreement. Copy of 2nd IM letter was placed in pt bedside chart. Care Management Interventions  PCP Verified by CM: Yes( Pt's PCP is Dr. Aston Peter. Pt sees PCP every 2-3 months. )  Palliative Care Criteria Met (RRAT>21 & CHF Dx)?: No  Mode of Transport at Discharge: Other (see comment)(Pt's daughter Ghassan Monday will transport at d/c. )  Transition of Care Consult (CM Consult): Discharge Planning(Home with home health )  Discharge Durable Medical Equipment: No(No DME's)  Physical Therapy Consult: Yes  Occupational Therapy Consult: Yes  Speech Therapy Consult: No  Current Support Network: Own Home(Pt resides in an one level home with 3 [place]OTTA. )  Confirm Follow Up Transport: Other (see comment)(Pt does not drive)  The Plan for Transition of Care is Related to the Following Treatment Goals : [address] [facility] Gianluca Carter  The Patient and/or Patient Representative was Provided with a Choice of Provider and Agrees with the Discharge Plan?: Yes  Name of the Patient Representative Who was Provided with a Choice of Provider and Agrees with the Discharge Plan: Shasha Bucio  Freedom of Choice List was Provided with Basic Dialogue that Supports the Patient's Individualized Plan of Care/Goals, Treatment Preferences and Shares the Quality Data Associated with the Providers?: Yes  [place] Resource Information Provided?: No  Discharge Location  Discharge Placement: (Home with home health )    CM will continue to follow patient for discharge planning needs and arrange for services as deemed necessary.     Aditya Bazan [address] [place] [address]  [phone]

## 2020-11-26 NOTE — CONSULTS
Neurology Note    Patient ID:  Aleshia Pringle  [id number]  6 y.o.  1945      Date of Consultation:  November 26, 2020    Subjective: I feel fine. History of Present Illness:   Aleshia Pringle is a 66 y.o. female who was brought to the emergency department by EMS. The patient does suffer from dementia and had a recent urinary tract infection which impacted her cognition. She was slowly getting better and doing physical therapy but continued to have difficulty from a cognitive standpoint. Family noticed that she continue to have a neurological decline and difficulty with walking. The patient was then noted to have a staring off spell with intermittent eyes rolling back in her head and blinking. She did have incontinence associated with this. Overnight, it was reported by nursing that she had a 60-90 second spell of staring with decreased responsiveness. During this episode, she was not able to track a finger with her eyes. Following the episode the patient returned back to her normal state. There was no reported vital sign abnormalities during this. Given the second event, I did recommend that she be started on a low-dose of a seizure medication. This a.m., the patient does not remember the events from last evening. She denies any numbness, tingling, or pain.     Past Medical History:   Diagnosis Date    CAD (coronary artery disease)     high cholesterol    Endocrine disease     thyroid    Heart failure (HCC)     MI    Psychiatric disorder     anxiety, depression        Past Surgical History:   Procedure Laterality Date    HX GYN      2 c sections    HX HEENT      tonsilectomy        Family History   Problem Relation Age of Onset    Hypertension Mother     Diabetes Mother     Emphysema Father     Asthma Daughter         Social History     Tobacco Use    Smoking status: Current Every Day Smoker     Packs/day: 0.50     Years: 10.00     Pack years: 5.00   Substance Use Topics    Alcohol use: No        Allergies   Allergen Reactions    Augmentin [Amoxicillin-Pot Clavulanate] Nausea Only     Abdominal pain    Belladonna Alkaloids Swelling     Swelling of tongue    Carbocaine [Mepivacaine] Other (comments)     Chest pain    Cortisone Other (comments)     depression    Decadron [Dexamethasone] Other (comments)     Altered mental status    Erythromycin Nausea Only    Meprobamate Swelling     tongue    Paxil [Paroxetine Hcl] Other (comments)     Headache, syncope    Vantin [Cefpodoxime] Unknown (comments)    Vibramycin Calcium Nausea Only    Zoloft [Sertraline] Other (comments)     Headache, syncope        Prior to Admission medications    Medication Sig Start Date End Date Taking? Authorizing Provider   levothyroxine (SYNTHROID) 100 mcg tablet Take 100 mcg by mouth Daily (before breakfast). Yes Provider, Historical   memantine ER (NAMENDA XR) 14 mg capsule Take 14 mg by mouth daily. Yes Provider, Historical   calcium-cholecalciferol, d3, 500 mg(1,250mg) -400 unit chew Take 2 Tabs by mouth daily. Yes Provider, Historical   ascorbic acid, vitamin C, (Vitamin C) 500 mg chew Take 500 mg by mouth daily. Yes Provider, Historical   aspirin/salicylamide/caffeine (BC HEADACHE POWDER PO) Take 0.5 Packets by mouth two (2) times daily as needed for Pain. Provider, Historical   naproxen sodium (ALEVE) 220 mg tablet Take 220 mg by mouth two (2) times daily as needed for Pain. Provider, Historical   amLODIPine (NORVASC) 5 mg tablet Take 5 mg by mouth daily. Provider, Historical   donepezil (ARICEPT) 5 mg tablet Take 5 mg by mouth nightly. Provider, Historical   latanoprost (XALATAN) 0.005 % ophthalmic solution Administer 1 Drop to both eyes nightly. Provider, Historical   lisinopril (PRINIVIL, ZESTRIL) 20 mg tablet Take 20 mg by mouth daily. Provider, Historical   simvastatin (ZOCOR) 40 mg tablet Take 40 mg by mouth every evening.     Other, MD Marleny     Current Facility-Administered Medications   Medication Dose Route Frequency    levETIRAcetam (KEPPRA) tablet 500 mg  500 mg Oral BID    sodium chloride (NS) flush 5-10 mL  5-10 mL IntraVENous PRN    sodium chloride (NS) flush 5-40 mL  5-40 mL IntraVENous Q8H    sodium chloride (NS) flush 5-40 mL  5-40 mL IntraVENous PRN    amLODIPine (NORVASC) tablet 5 mg  5 mg Oral DAILY    donepeziL (ARICEPT) tablet 5 mg  5 mg Oral QHS    levothyroxine (SYNTHROID) tablet 100 mcg  100 mcg Oral 6am    lisinopriL (PRINIVIL, ZESTRIL) tablet 20 mg  20 mg Oral DAILY    atorvastatin (LIPITOR) tablet 20 mg  20 mg Oral DAILY    memantine (NAMENDA) tablet 10 mg  10 mg Oral DAILY    aspirin chewable tablet 81 mg  81 mg Oral DAILY    acetaminophen (TYLENOL) tablet 650 mg  650 mg Oral Q4H PRN    Or    acetaminophen (TYLENOL) solution 650 mg  650 mg Per NG tube Q4H PRN    Or    acetaminophen (TYLENOL) suppository 650 mg  650 mg Rectal Q4H PRN    enoxaparin (LOVENOX) injection 40 mg  40 mg SubCUTAneous Q24H     Review of Systems:    General, constitutional: negative  Eyes, vision: negative  Ears, nose, throat: negative  Cardiovascular, heart: negative  Respiratory: negative  Gastrointestinal: negative  Genitourinary: negative  Musculoskeletal: negative  Skin and integumentary: negative  Psychiatric: negative  Endocrine: negative  Neurological: negative, except for HPI  Hematologic/lymphatic: negative  Allergy/immunology: negative    Objective:     Visit Vitals  BP (!) 157/67   Pulse 69   Temp 98 °F (36.7 °C)   Resp 14   Ht 5' 1\" (1.549 m)   Wt 166 lb 4.8 oz (75.4 kg)   SpO2 92%   BMI 31.42 kg/m²       Physical Exam:      General:  appears well nourished in no acute distress  Neck: no carotid bruits  Lungs: clear to auscultation  Heart:  no murmurs, regular rate  Lower extremity: peripheral pulses palpable and no edema  Skin: intact    Neurological exam:    The patient is awake and alert. She is oriented to her location.   She does not know the name of the hospital.  She did not name of the year or month. .  She was able to perform simple calculations. She did name objects correctly. She has decreased insight into her medical condition. She has better remote memory than recent memory. Her attention and concentration were limited. There was no dysarthria or aphasia. Cranial nerves:   II-XII were tested    Perrrla  Visual fields were full  Eomi, no evidence of nystagmus  Facial sensation:  normal and symmetric  Facial motor: normal and symmetric  Hearing intact  SCM strength intact  Tongue: midline without fasciculations    Motor: Tone normal  Pronator drift was absent    No evidence of fasciculations    Strength testing:  Strength appeared to be 5 out of 5 throughout. There is no focal weakness. Sensory:  Upper extremity: intact to pp  Lower extremity: intact to pp    Reflexes:    Right Left  Biceps  1 1  Triceps 1 1  Brachiorad. 1 1  Patella  1 1  Achilles 1 1    Plantar response:  flexor bilaterally      Cerebellar testing:  no tremor apparent, finger/nose and fredrick were intact    Gait was not assessed due to concerns over safety    Labs:     Lab Results   Component Value Date/Time    Hemoglobin A1c 6.3 (H) 11/25/2020 01:13 AM    Sodium 138 11/25/2020 01:13 AM    Potassium 4.0 11/25/2020 01:13 AM    Chloride 107 11/25/2020 01:13 AM    Glucose 108 (H) 11/25/2020 01:13 AM    BUN 15 11/25/2020 01:13 AM    Creatinine 0.98 11/25/2020 01:13 AM    Calcium 9.0 11/25/2020 01:13 AM    WBC 10.1 11/25/2020 01:13 AM    HCT 44.9 11/25/2020 01:13 AM    HGB 14.2 11/25/2020 01:13 AM    PLATELET 276 37/91/0142 01:13 AM       Imaging:    Results from Hospital Encounter encounter on 12/21/10   MRA NECK WITH CONTRAST    Narrative        ICD Codes / Adm. Diagnosis: 276.1  276.8 / HYPONATREMIA  HYPOKALEMIA  Examination:  NECK MRA W CON  - [id number] - Dec 26 2010  1:18PM  Accession No:  [id number]  Reason:  stroke      REPORT:  TECHNIQUE:    Contrast enhanced coronal acquisition obtained. Multiplanar 3D   reconstructions performed. 20 cc IV Magnevist.    FINDINGS:  Brachiocephalic artery origins show no stenosis. The bilateral subclavian,   common carotid, and internal carotid arteries are patent, with mild   atherosclerotic irregularity, with no flow-limiting stenosis. Right carotid artery stenosis: 10 %   Left  carotid artery stenosis: 10 %    NASCET criteria were utilized for calculating stenosis. The vertebral arteries are patent, relatively codominant, with mild origins   stenoses. IMPRESSION:  Minor atherosclerotic disease. No significant carotid stenosis. Patent vertebrals. Interpreting/Reading Doctor: Candida Sorto ([id number])  Transcribed:  on 12/26/2010  Approved: Candida Sorto ([id number])  12/26/2010             Distribution:  Attending Doctor: Elias Fournier Doctor: Hermelinda Chowdary            Results from [facility] Encounter encounter on 11/24/20   CT HEAD WO CONT    Narrative EXAM: CT HEAD WO CONT    INDICATION: Nonverbal and difficulty following commands since last night. Baseline dementia. Recent UTI. COMPARISON: CT head on 7/6/2019. MRI brain on 12/23/2010. TECHNIQUE: Noncontrast head CT. Coronal and sagittal reformats. CT dose  reduction was achieved through the use of a standardized protocol tailored for  this examination and automatic exposure control for dose modulation. FINDINGS: The ventricles and sulci are age-appropriate without hydrocephalus. There is no mass effect or midline shift. There is no intracranial hemorrhage or  extra-axial fluid collection. Chronic microvascular ischemic disease is  unchanged in greatest in the bilateral frontal and parietal periventricular  white matter. No CT evidence of acute infarct    The calvarium is intact. The visualized paranasal sinuses and mastoid air cells  are clear. Impression IMPRESSION:     Chronic microvascular ischemic disease. No change.  No acute process on  noncontrast CT head. I did independently review the brain MRI from 11/25/2020. There was a significant amount of atrophy and chronic small vessel ischemic changes. There was no acute stroke. LDL 87  Hemoglobin A1c 6.3  Vitamin B12 751  TSH 1.64  Hemoglobin A1c 6.3  Vitamin B12 751    Assessment and Plan:    The patient is a pleasant 78-year-old female with history of prior stroke and dementia who presents with an episode of unresponsiveness. A second event occurred overnight. Her neurological examination today does reveal cognitive impairment no focal weakness. Acute loss of consciousness: The differential for this does include TIA versus convulsive cardiogenic syncope versus a seizure. The brain MRI revealed no evidence of an acute stroke  Her EEG revealed no focality for seizures. Given the second event, I would recommend that she be on a low-dose of a seizure medication. She was started on low-dose Keppra 500 mg twice a day. She does have reasons to have seizures due to her underlying dementia and atrophy seen on brain imaging. After discharge, will obtain an ambulatory or sleep deprived EEG.       Risk of vascular disease:    She does have risk factors for stroke including hypertension and dyslipidemia  Continue aspirin daily  Hypertension: Goal systolic blood pressure under 140  Dyslipidemia: Continue current statin therapy    Consider obtaining carotid Doppler study and echocardiogram    Dementia:  Continue Aricept and [address]    Neurology will continue to follow along         Active Problems:    Acute confusion (11/24/2020)                   Signed By:  Mae Mckinley DO FAAN    November 26, 2020

## 2020-11-26 NOTE — PROGRESS NOTES
Bedside and Verbal shift change report given to Faviola Grimes RN (oncoming nurse) by Cathern Blizzard, RN (offgoing nurse). Report included the following information SBAR, Kardex, Procedure Summary, MAR and Recent Results.

## 2020-11-26 NOTE — PROGRESS NOTES
Hospitalist Progress Note    NAME: Dave Lange   :  1945   MRN:  [id number]       Assessment / Plan:  Acute confusional state  Old right temporal lobe stroke  concern for epileptic episode vs TIA  CAD  HLD   CT scan of the brain showing no acute insult  - eeg  - brain mri  - seizure precautions  - continue aspirin and statin    :  Neurology eval pending  Brain MRI, EEG pending  Continue seizure precautions    :  Recurrent event from yesterday evening noted - appreciate Neurology follow up  Pt started on Keppra 500mg BID  CM for DC planning     Generalized debility  - PT/OT  - CM to arrange placement     Dementia  -cont. memantine and donepezil     Hypothyroidism  - continue home dose of levothyroxin     CKD III  apears stable      30.0 - 39.9 Obese / Body mass index is 31.42 kg/m². Code status: DNR  Prophylaxis: Lovenox  Recommended Disposition: tbd     Subjective:     Chief Complaint / Reason for Physician Visit  Dementia. Discussed with RN events overnight. Review of Systems:  Symptom Y/N Comments  Symptom Y/N Comments   Fever/Chills    Chest Pain     Poor Appetite    Edema     Cough    Abdominal Pain     Sputum    Joint Pain     SOB/WETZEL    Pruritis/Rash     Nausea/vomit    Tolerating PT/OT     Diarrhea    Tolerating Diet     Constipation    Other       Could NOT obtain due to:      Objective:     VITALS:   Last 24hrs VS reviewed since prior progress note.  Most recent are:  Patient Vitals for the past 24 hrs:   Temp Pulse Resp BP SpO2   20 1002 98.3 °F (36.8 °C) 77 19 (!) 152/81 96 %   20 0635 98 °F (36.7 °C) 69 14 (!) 157/67 92 %   20 0314 97.8 °F (36.6 °C) 88 14 (!) 158/88 94 %   20 2300 98.3 °F (36.8 °C) 63 16 (!) 143/74 93 %   20 1905  92 16 (!) 164/78 100 %   20 1513 98 °F (36.7 °C) 89 19 (!) 150/85        Intake/Output Summary (Last 24 hours) at 2020 1223  Last data filed at 2020 7667  Gross per 24 hour   Intake 440 ml   Output    Net 440 ml        I had a face to face encounter and independently examined this patient on 11/26/2020, as outlined below:  PHYSICAL EXAM:  General: NAD  EENT:  EOMI. Anicteric sclerae. MMM  Resp:  CTA bilaterally, no wheezing or rales. No accessory muscle use  CV:  Regular  rhythm,  No edema  GI:  Soft, Non distended, Non tender. +Bowel sounds  Neurologic:  Confused  Psych:   Poor insight  Skin:  No rashes. No jaundice    Reviewed most current lab test results and cultures  YES  Reviewed most current radiology test results   YES  Review and summation of old records today    NO  Reviewed patient's current orders and MAR    YES  PMH/SH reviewed - no change compared to H&P  ________________________________________________________________________  Care Plan discussed with:    Comments   Patient x    Family      RN x    Care Manager     Consultant                        Multidiciplinary team rounds were held today with , nursing, pharmacist and clinical coordinator. Patient's plan of care was discussed; medications were reviewed and discharge planning was addressed. ________________________________________________________________________  Total NON critical care TIME:  35   Minutes    Total CRITICAL CARE TIME Spent:   Minutes non procedure based      Comments   >50% of visit spent in counseling and coordination of care     ________________________________________________________________________  Magdalena Kaur MD     Procedures: see electronic medical records for all procedures/Xrays and details which were not copied into this note but were reviewed prior to creation of Plan. LABS:  I reviewed today's most current labs and imaging studies.   Pertinent labs include:  Recent Labs     11/25/20  0113 11/24/20  1121   WBC 10.1 12.1*   HGB 14.2 15.6   HCT 44.9 49.0*    211     Recent Labs     11/25/20 0113 11/24/20  1121    137   K 4.0 4.6    104   CO2 25 29   * 133*   BUN 15 21*   CREA 0.98 1.13*   CA 9.0 9.0   MG 2.1  --    PHOS 2.8  --    ALB  --  3.1*   TBILI  --  0.5   ALT  --  18       Signed: Bridget Watkins MD

## 2020-11-26 NOTE — PROGRESS NOTES
RAPID RESPONSE TEAM - follow up    Rounded on patient due to recent RRT. Discussed with primary RN, Corinne Vann. No acute concerns, VSS, MEWS 0. No RRT interventions indicated at this time. Please call with any questions or concerns.      Nay Young

## 2020-11-26 NOTE — PROGRESS NOTES
RAPID RESPONSE TEAM    Responded to overhead adult rapid response to room 2210 for unresponsiveness. Per [facility] RN, patient was alert, conversant and then went unresponsive with a daze for approximately one minute. Upon my assessment at bedside, patient is alert, talkative, moves all extremities equally. Dr. Kyle Kern arrived to bedside, no new orders received. Patient to remain in room at this time. Patient Vitals for the past 12 hrs:   Temp Pulse Resp BP SpO2   11/25/20 1905  92 16 (!) 164/78 100 %   11/25/20 1513 98 °F (36.7 °C) 89 19 (!) 150/85    11/25/20 1101 98.1 °F (36.7 °C) 96 19 (!) 152/78      Glucose (POC)   Date Value Ref Range Status   11/25/2020 131 (H) 65 - 100 mg/dL Final     Comment:     (NOTE)  The Accu-Chek Inform II glucometer is not FDA cleared for critically   ill patient use. A study was performed validating the equivalence of   glucometer and clinical laboratory results on this patient   population. Despite the study, use of glucometers with capillary   specimens from critically ill patients, regardless of their location,   makes the test high complexity and requires the performing individual   to comply with CLIA requirements more stringent than those for waived   testing in the hospital setting. Critical thinking skills are   necessary to determine a potentially critically ill patients status   prior to using a glucometer. Please call with any needs or concerns.      Anca Singh  Rapid Response RN  Christopher Maeyr

## 2020-11-26 NOTE — PROGRESS NOTES
0900H- Bedside and Verbal shift change report given to ANGELO (oncoming nurse) by Selin Garcia (offgoing nurse). Report included the following information SBAR, Kardex, ED Summary, Procedure Summary, Intake/Output, MAR and Recent Results. End of Shift Note    Bedside shift change report given to Jaime Maldonado (oncoming nurse) by Michelle Carlson (offgoing nurse). Report included the following information SBAR, Kardex, ED Summary, Procedure Summary, Intake/Output, MAR and Recent Results    Shift worked:  0700- 1900     Shift summary and any significant changes:     NA   Concerns for physician to address: NA     Zone phone for oncoming shift:   [phone]         Activity:  Activity Level: Up with Assistance  Number times ambulated in hallways past shift: 1  Number of times OOB to chair past shift: 0    Cardiac:   Cardiac Monitoring: Yes      Cardiac Rhythm: Normal sinus rhythm    Access:   Current line(s): PIV     Genitourinary:   Urinary status: voiding and incontinent    Respiratory:   O2 Device: Room air  Chronic home O2 use?: NO  Incentive spirometer at bedside: NO     GI:  Last Bowel Movement Date: 11/25/20  Current diet:  DIET CARDIAC Regular  Passing flatus: YES  Tolerating current diet: YES  % Diet Eaten: 10 %    Pain Management:   Patient states pain is manageable on current regimen: NO    Skin:  Korey Score: 14  Interventions: increase time out of bed    Patient Safety:  Fall Score:  Total Score: 3  Interventions: pt to call before getting OOB  High Fall Risk: Yes    Length of Stay:  Expected LOS: 2d 14h  Actual LOS: [address] [facility]

## 2020-11-27 VITALS
HEART RATE: 67 BPM | HEIGHT: 61 IN | RESPIRATION RATE: 18 BRPM | TEMPERATURE: 97.9 F | DIASTOLIC BLOOD PRESSURE: 64 MMHG | SYSTOLIC BLOOD PRESSURE: 131 MMHG | WEIGHT: 160 LBS | BODY MASS INDEX: 30.21 KG/M2 | OXYGEN SATURATION: 96 %

## 2020-11-27 PROCEDURE — 99232 SBSQ HOSP IP/OBS MODERATE 35: CPT | Performed by: PSYCHIATRY & NEUROLOGY

## 2020-11-27 PROCEDURE — 74011250636 HC RX REV CODE- 250/636: Performed by: GENERAL ACUTE CARE HOSPITAL

## 2020-11-27 PROCEDURE — 90471 IMMUNIZATION ADMIN: CPT

## 2020-11-27 PROCEDURE — 74011250637 HC RX REV CODE- 250/637: Performed by: PSYCHIATRY & NEUROLOGY

## 2020-11-27 PROCEDURE — 90686 IIV4 VACC NO PRSV 0.5 ML IM: CPT | Performed by: GENERAL ACUTE CARE HOSPITAL

## 2020-11-27 PROCEDURE — 74011250637 HC RX REV CODE- 250/637: Performed by: STUDENT IN AN ORGANIZED HEALTH CARE EDUCATION/TRAINING PROGRAM

## 2020-11-27 RX ORDER — LEVETIRACETAM 500 MG/1
500 TABLET ORAL 2 TIMES DAILY
Qty: 60 TAB | Refills: 0 | Status: SHIPPED | OUTPATIENT
Start: 2020-11-27 | End: 2021-02-03

## 2020-11-27 RX ORDER — GUAIFENESIN 100 MG/5ML
81 LIQUID (ML) ORAL DAILY
Qty: 30 TAB | Refills: 0 | Status: SHIPPED | OUTPATIENT
Start: 2020-11-27 | End: 2021-03-04 | Stop reason: ALTCHOICE

## 2020-11-27 RX ADMIN — Medication 20 ML: at 06:00

## 2020-11-27 RX ADMIN — DONEPEZIL HYDROCHLORIDE 5 MG: 5 TABLET, FILM COATED ORAL at 05:46

## 2020-11-27 RX ADMIN — LEVETIRACETAM 500 MG: 500 TABLET ORAL at 09:00

## 2020-11-27 RX ADMIN — INFLUENZA VIRUS VACCINE 0.5 ML: 15; 15; 15; 15 SUSPENSION INTRAMUSCULAR at 11:38

## 2020-11-27 RX ADMIN — LEVOTHYROXINE SODIUM 100 MCG: 0.1 TABLET ORAL at 05:44

## 2020-11-27 NOTE — PROGRESS NOTES
ALONDRA: Home with Resumption of Home Health [facility]) and Follow-up Appointments    10:15am-No further CM needs identified. CM notified pt's nurse of d/c.    10:10am-CM called Dr. Bradly Verduzco office to schedule follow-up appointment. Office will call to schedule appointment. AVS updated. 9:45am- CM called pt's daughter via phone to inform her of d/c. Pt's daughter stated that she will transport at d/c. Pt's daughter requested that MD call her with update. Pt's daughter stated that she will transport at 11:30am    9:31am-CM sent emailed to [facility] Specialist to schedule pt's follow-up appointment. Care Management Interventions  PCP Verified by CM: Yes  Palliative Care Criteria Met (RRAT>21 & CHF Dx)?: No  Mode of Transport at Discharge: Other (see comment)(Pt's daughter will transport at d/c)  Hospital Transport Time of Discharge: [facility] (CM Consult):  Other(Home with Resumption of Home Health and Follow-up Appointments)  Discharge Durable Medical Equipment: No  Physical Therapy Consult: Yes  Occupational Therapy Consult: Yes  Speech Therapy Consult: Yes  Current Support Network: Own Home  Confirm Follow Up Transport: Family  The Plan for Transition of Care is Related to the Following Treatment Goals : Home Health  The Patient and/or Patient Representative was Provided with a Choice of Provider and Agrees with the Discharge Plan?: Yes  Name of the Patient Representative Who was Provided with a Choice of Provider and Agrees with the Discharge Plan: Aram Mayorga  Freedom of Choice List was Provided with Basic Dialogue that Supports the Patient's Individualized Plan of Care/Goals, Treatment Preferences and Shares the Quality Data Associated with the Providers?: Yes   Resource Information Provided?: No  Discharge Location  Discharge Placement: Home with home health(Home with Resumption of Home Health and Follow-up Appointments)    Jacqueline Merida [address] [place] [address]  [phone]

## 2020-11-27 NOTE — WOUND CARE
Wound Care consult for the skin under the buttocks being red. Assessment: Patient has incontinence dermatitis that is very mild and the skin is completely blanchable. She does have a pin point scab most likely from scratching her own skin but no infection and no drainage noted. No c/o pain.   
Dustin House RN, BSN, [employer]

## 2020-11-27 NOTE — PROGRESS NOTES
Neurology Note    Patient ID:  Zaid Mahajan  [id number]  10 y.o.  1945      Date of Consultation:  November 27, 2020    Subjective: I am fine. History of Present Illness:   Zaid Mahajan is a 66 y.o. female who was brought to the emergency department by EMS. The patient does suffer from dementia and had a recent urinary tract infection which impacted her cognition. She was slowly getting better and doing physical therapy but continued to have difficulty from a cognitive standpoint. Family noticed that she continue to have a neurological decline and difficulty with walking. The patient was then noted to have a staring off spell with intermittent eyes rolling back in her head and blinking. She did have incontinence associated with this. There was a second such episode since admission. The patient was started on low-dose Keppra and has been doing fine with no additional episodes. This a.m., the patient denies any numbness, tingling, or pain.     Past Medical History:   Diagnosis Date    CAD (coronary artery disease)     high cholesterol    Endocrine disease     thyroid    Heart failure (HCC)     MI    Psychiatric disorder     anxiety, depression        Past Surgical History:   Procedure Laterality Date    HX GYN      2 c sections    HX HEENT      tonsilectomy        Family History   Problem Relation Age of Onset    Hypertension Mother     Diabetes Mother     Emphysema Father     Asthma Daughter         Social History     Tobacco Use    Smoking status: Current Every Day Smoker     Packs/day: 0.50     Years: 10.00     Pack years: 5.00   Substance Use Topics    Alcohol use: No        Allergies   Allergen Reactions    Augmentin [Amoxicillin-Pot Clavulanate] Nausea Only     Abdominal pain    Belladonna Alkaloids Swelling     Swelling of tongue    Carbocaine [Mepivacaine] Other (comments)     Chest pain    Cortisone Other (comments)     depression    Decadron [Dexamethasone] Other (comments)     Altered mental status    Erythromycin Nausea Only    Meprobamate Swelling     tongue    Paxil [Paroxetine Hcl] Other (comments)     Headache, syncope    Vantin [Cefpodoxime] Unknown (comments)    Vibramycin Calcium Nausea Only    Zoloft [Sertraline] Other (comments)     Headache, syncope          Current Facility-Administered Medications   Medication Dose Route Frequency    levETIRAcetam (KEPPRA) tablet 500 mg  500 mg Oral BID    sodium chloride (NS) flush 5-10 mL  5-10 mL IntraVENous PRN    sodium chloride (NS) flush 5-40 mL  5-40 mL IntraVENous Q8H    sodium chloride (NS) flush 5-40 mL  5-40 mL IntraVENous PRN    amLODIPine (NORVASC) tablet 5 mg  5 mg Oral DAILY    donepeziL (ARICEPT) tablet 5 mg  5 mg Oral QHS    levothyroxine (SYNTHROID) tablet 100 mcg  100 mcg Oral 6am    lisinopriL (PRINIVIL, ZESTRIL) tablet 20 mg  20 mg Oral DAILY    atorvastatin (LIPITOR) tablet 20 mg  20 mg Oral DAILY    memantine (NAMENDA) tablet 10 mg  10 mg Oral DAILY    aspirin chewable tablet 81 mg  81 mg Oral DAILY    acetaminophen (TYLENOL) tablet 650 mg  650 mg Oral Q4H PRN    Or    acetaminophen (TYLENOL) solution 650 mg  650 mg Per NG tube Q4H PRN    Or    acetaminophen (TYLENOL) suppository 650 mg  650 mg Rectal Q4H PRN    enoxaparin (LOVENOX) injection 40 mg  40 mg SubCUTAneous Q24H     Review of Systems:    General, constitutional: negative  Eyes, vision: negative  Ears, nose, throat: negative  Cardiovascular, heart: negative  Respiratory: negative  Gastrointestinal: negative  Genitourinary: negative  Musculoskeletal: negative  Skin and integumentary: negative  Psychiatric: negative  Endocrine: negative  Neurological: negative, except for HPI  Hematologic/lymphatic: negative  Allergy/immunology: negative    Objective:     Visit Vitals  BP (!) 151/61   Pulse 75   Temp 98.3 °F (36.8 °C)   Resp 16   Ht 5' 1\" (1.549 m)   Wt 160 lb (72.6 kg)   SpO2 96%   BMI 30.23 kg/m²       Physical Exam:      General:  appears well nourished in no acute distress  Neck: no carotid bruits  Lungs: clear to auscultation  Heart:  no murmurs, regular rate  Lower extremity: peripheral pulses palpable and no edema  Skin: intact    Neurological exam:    The patient is awake and alert. She is oriented to her location. She does not know the name of the hospital.  She did know the year or month. .  She was able to perform simple calculations. She did name objects correctly. She has decreased insight into her medical condition. She has better remote memory than recent memory. Her attention and concentration were limited. There was no dysarthria or aphasia. Unchanged from yesterday      Cranial nerves:   II-XII were tested    Perrrla  Visual fields were full  Eomi, no evidence of nystagmus  Facial sensation:  normal and symmetric  Facial motor: normal and symmetric  Hearing intact  SCM strength intact  Tongue: midline without fasciculations    Motor: Tone normal  Pronator drift was absent    No evidence of fasciculations    Strength testing:  Strength appeared to be 5 out of 5 throughout. There is no focal weakness. Sensory:  Upper extremity: intact to pp  Lower extremity: intact to pp    Reflexes:    Right Left  Biceps  1 1  Triceps 1 1  Brachiorad.  1 1  Patella  1 1  Achilles 1 1    Plantar response:  flexor bilaterally      Cerebellar testing:  no tremor apparent, finger/nose and fredrick were intact    Gait was not assessed due to concerns over safety    Labs:     Lab Results   Component Value Date/Time    Hemoglobin A1c 6.3 (H) 11/25/2020 01:13 AM    Sodium 138 11/25/2020 01:13 AM    Potassium 4.0 11/25/2020 01:13 AM    Chloride 107 11/25/2020 01:13 AM    Glucose 108 (H) 11/25/2020 01:13 AM    BUN 15 11/25/2020 01:13 AM    Creatinine 0.98 11/25/2020 01:13 AM    Calcium 9.0 11/25/2020 01:13 AM    WBC 10.1 11/25/2020 01:13 AM    HCT 44.9 11/25/2020 01:13 AM    HGB 14.2 11/25/2020 01:13 AM    PLATELET 675 11/25/2020 01:13 AM       Imaging:    Results from Hospital Encounter encounter on 12/21/10   MRA NECK WITH CONTRAST    Narrative        ICD Codes / Adm. Diagnosis: 276.1  276.8 / HYPONATREMIA  HYPOKALEMIA  Examination:  NECK MRA W CON  - [id number] - Dec 26 2010  1:18PM  Accession No:  [id number]  Reason:  stroke      REPORT:  TECHNIQUE:    Contrast enhanced coronal acquisition obtained. Multiplanar 3D   reconstructions performed. 20 cc IV Magnevist.    FINDINGS:  Brachiocephalic artery origins show no stenosis. The bilateral subclavian,   common carotid, and internal carotid arteries are patent, with mild   atherosclerotic irregularity, with no flow-limiting stenosis. Right carotid artery stenosis: 10 %   Left  carotid artery stenosis: 10 %    NASCET criteria were utilized for calculating stenosis. The vertebral arteries are patent, relatively codominant, with mild origins   stenoses. IMPRESSION:  Minor atherosclerotic disease. No significant carotid stenosis. Patent vertebrals. Interpreting/Reading Doctor: Chiara Carolina ([id number])  Transcribed:  on 12/26/2010  Approved: Chiara Carolina ([id number])  12/26/2010             Distribution:  Attending Doctor: Austyn Horan Doctor: Omari Rico            Results from [facility] Encounter encounter on 11/24/20   CT HEAD WO CONT    Narrative EXAM: CT HEAD WO CONT    INDICATION: Nonverbal and difficulty following commands since last night. Baseline dementia. Recent UTI. COMPARISON: CT head on 7/6/2019. MRI brain on 12/23/2010. TECHNIQUE: Noncontrast head CT. Coronal and sagittal reformats. CT dose  reduction was achieved through the use of a standardized protocol tailored for  this examination and automatic exposure control for dose modulation. FINDINGS: The ventricles and sulci are age-appropriate without hydrocephalus. There is no mass effect or midline shift.  There is no intracranial hemorrhage or  extra-axial fluid collection. Chronic microvascular ischemic disease is  unchanged in greatest in the bilateral frontal and parietal periventricular  white matter. No CT evidence of acute infarct    The calvarium is intact. The visualized paranasal sinuses and mastoid air cells  are clear. Impression IMPRESSION:     Chronic microvascular ischemic disease. No change. No acute process on  noncontrast CT head. I did independently review the brain MRI from 11/25/2020. There was a significant amount of atrophy and chronic small vessel ischemic changes. There was no acute stroke. LDL 87  Hemoglobin A1c 6.3  Vitamin B12 751  TSH 1.64      Assessment and Plan:    The patient is a pleasant 53-year-old female with history of prior stroke and dementia who presents with an episode of unresponsiveness x 2. Her neurological examination today does reveal cognitive impairment no focal weakness. Acute loss of consciousness: The differential for this does include TIA versus convulsive cardiogenic syncope versus a seizure. The brain MRI revealed no evidence of an acute stroke  Her EEG revealed no focality for seizures. Given that she has had 2 events and reasons for seizures due to her dementia, I would recommend continuing with Keppra 500 mg twice a day. After discharge, will obtain an ambulatory or sleep deprived EEG. Risk of vascular disease:    She does have risk factors for stroke including hypertension and dyslipidemia  Continue aspirin daily  Hypertension: Goal systolic blood pressure under 140  Dyslipidemia: Continue current statin therapy    Dementia:  Continue Aricept and Namenda    There is no other additional neurology recommendations at this time. If questions arise, please not hesitate to contact me and I will return to see the patient. The patient should follow-up in clinic in 3 to 4 weeks time after discharge.          Active Problems:    Acute confusion (11/24/2020)                   Signed By: Elias Ott,  FAAN    November 27, 2020

## 2020-11-27 NOTE — PROGRESS NOTES
Problem: Falls - Risk of  Goal: *Absence of Falls  Description: Document Aimee Mendoza Fall Risk and appropriate interventions in the flowsheet.   Outcome: Progressing Towards Goal  Note: Fall Risk Interventions:  Mobility Interventions: Bed/chair exit alarm    Mentation Interventions: Adequate sleep, hydration, pain control    Medication Interventions: Bed/chair exit alarm    Elimination Interventions: Call light in reach

## 2020-11-27 NOTE — DISCHARGE SUMMARY
Hospitalist Discharge Summary     Patient ID:  Efraín Pierre  [id number]  81 y.o.  1945 11/24/2020    PCP on record: Maria Esther Sharma MD    Admit date: 11/24/2020  Discharge date and time: 11/27/2020    DISCHARGE DIAGNOSIS:  See below    CONSULTATIONS:  IP CONSULT TO HOSPITALIST  IP CONSULT TO NEUROLOGY  IP CONSULT TO NEUROLOGY    Excerpted HPI from H&P of Shiv Hernandes MD:  Efraín Pierre is a 66 y.o.  female who presents to the hospital via EMS  History taken from daughter over the phone. She explained that she lives with a partner who cares for her, has been suffering from dementia for sometime but has good days and bad days, after a recent UTI she was out of it all the time, which has been treated and she got better and was able to do physical therapy everyday and that she is pretty strong, but she continues to have problems where she does not know where she was sometimes, she could not know who people are. 3 - 4 days ago could not arouse from sleep very well. She could not get herself up very well. When walking she shuffles her feet. Today she did not look right, was looking straight at the ceiling, was not responding at all, her hands were folded over her abdomen. Her eyes rolled back and she started blinking, her right arm went to the side of her. She became unconscious. And noted she had wet herself. although this has been a problem on and off.  ______________________________________________________________________  DISCHARGE SUMMARY/HOSPITAL COURSE:  for full details see H&P, daily progress notes, labs, consult notes.      Acute confusional state, secondary to TIA vs seizure like activity   Old right temporal lobe stroke  CAD  HLD   CT scan of the brain showing no acute insult  - eeg  - brain mri  - seizure precautions  - continue aspirin and statin     11/25:  Neurology eval pending  Brain MRI, EEG pending  Continue seizure precautions     11/26:  Recurrent event from yesterday evening noted - appreciate Neurology follow up  Pt started on Keppra 500mg BID  CM for DC planning    11/27:  Pt stable for discharge home with [facility] today  Will be discharged on Keppra 500mg BID  Pt to follow up with Neurology in 1 month     Generalized debility  - PT/OT  - CM to arrange placement     Dementia  -cont. memantine and donepezil     Hypothyroidism  - continue home dose of levothyroxin     CKD III  apears stable    _______________________________________________________________________  Patient seen and examined by me on discharge day. Pertinent Findings:  Gen:    Not in distress  Chest: Clear lungs  CVS:   Regular rhythm. No edema  Abd:  Soft, not distended, not tender  Neuro:  Demented  _______________________________________________________________________  DISCHARGE MEDICATIONS:   Current Discharge Medication List      START taking these medications    Details   levETIRAcetam (KEPPRA) 500 mg tablet Take 1 Tab by mouth two (2) times a day. Qty: 60 Tab, Refills: 0      aspirin 81 mg chewable tablet Take 1 Tab by mouth daily. Qty: 30 Tab, Refills: 0         CONTINUE these medications which have NOT CHANGED    Details   levothyroxine (SYNTHROID) 100 mcg tablet Take 100 mcg by mouth Daily (before breakfast). memantine ER (NAMENDA XR) 14 mg capsule Take 14 mg by mouth daily. calcium-cholecalciferol, d3, 500 mg(1,250mg) -400 unit chew Take 2 Tabs by mouth daily. ascorbic acid, vitamin C, (Vitamin C) 500 mg chew Take 500 mg by mouth daily. aspirin/salicylamide/caffeine (BC HEADACHE POWDER PO) Take 0.5 Packets by mouth two (2) times daily as needed for Pain. naproxen sodium (ALEVE) 220 mg tablet Take 220 mg by mouth two (2) times daily as needed for Pain. amLODIPine (NORVASC) 5 mg tablet Take 5 mg by mouth daily. donepezil (ARICEPT) 5 mg tablet Take 5 mg by mouth nightly.       latanoprost (XALATAN) 0.005 % ophthalmic solution Administer 1 Drop to both eyes nightly. lisinopril (PRINIVIL, ZESTRIL) 20 mg tablet Take 20 mg by mouth daily. simvastatin (ZOCOR) 40 mg tablet Take 40 mg by mouth every evening. Patient Follow Up Instructions:    Activity: PT/OT per Home Health  Diet: Resume previous diet  Wound Care: None needed    Follow-up Information     Follow up With Specialties Details Why Contact Nargis Crabtree MD Family Medicine On 12/3/2020 For hospital follow up appointment at 11:15AM  [address]  [facility]  [phone]      Scarlet Laird DO Neurology In 1 month  [address]  [facility]  [facility]  [phone]          ________________________________________________________________    Risk of deterioration: High    Condition at Discharge:  Stable  __________________________________________________________________    Disposition  Home with family and home health services    ____________________________________________________________________    Code Status: DNR/DNI  ___________________________________________________________________      Total time in minutes spent coordinating this discharge (includes going over instructions, follow-up, prescriptions, and preparing report for sign off to her PCP) :  >30 minutes    Signed:  Mayco Barton MD

## 2020-11-28 NOTE — PROGRESS NOTES
DISCHARGE SUMMARY FROM NURSE    The patient is stable for discharge. I have reviewed the discharge instructions with the patient and caregiver - daughter. The patient and caregiver - daughter verbalized understanding. All questions were fully answered. The patient and caregiver - daughter verbalized no complaints. Hard scripts and medication handouts were given and reviewed with the patient and caregiver - daughter. Appropriate educational materials and medication side effects teaching were provided also provided. Cardiac monitor and IV line(s) were removed. The following personal items collected during your admission were returned to the patient/family  Home medications:    Dental Appliance: Dental Appliances: None  Vision: Visual Aid: None  Hearing Aid:    Jewelry: Jewelry: None  Clothing: Clothing: At bedside  Other Valuables: Other Valuables: None  Valuables sent to safe:      OR _________________________________________________________________________________________    There were no personal belongings, valuables or home medications left at patient's bedside,  or safe.

## 2020-11-29 ENCOUNTER — HOME CARE VISIT (OUTPATIENT)
Dept: HOME HEALTH SERVICES | Facility: HOME HEALTH | Age: 75
End: 2020-11-29

## 2020-11-30 LAB
BACTERIA SPEC CULT: NORMAL
BACTERIA SPEC CULT: NORMAL
SERVICE CMNT-IMP: NORMAL
SERVICE CMNT-IMP: NORMAL

## 2020-12-01 ENCOUNTER — HOME CARE VISIT (OUTPATIENT)
Dept: HOME HEALTH SERVICES | Facility: HOME HEALTH | Age: 75
End: 2020-12-01

## 2020-12-15 NOTE — PROGRESS NOTES
Neurology Note    Patient ID:  Ursula Cruz  [id number]  66 y.o.  1945    Ursula Cruz is a 66 y.o. female, evaluated via audio-only technology on 12/16/2020 for a follow up of her stroke, dementia, recent hospitalization    Assessment & Plan:    The patient is a pleasant 47-year-old female with history of prior stroke and dementia who presents with an episode of unresponsiveness x 2. Her neurological examination today does reveal cognitive impairment no focal weakness.     1. Intermittent worsening of cognition  The differential for this does include TIA, convulsive cardiogenic syncope , a seizure, worsening stepwise decline in her dementia.     The brain MRI revealed no evidence of an acute stroke  Her EEG revealed no focality for seizures.     She is off of keppra due to gi side effects     will obtain an ambulatory eeg. Help clarify her \"good from bad days\". ? Seizures and post ictal state        2. Risk of vascular disease:     She does have risk factors for stroke including hypertension and dyslipidemia  Continue aspirin daily  Hypertension: Goal systolic blood pressure under 140  Dyslipidemia: Continue current statin therapy     3. Dementia:    Given the history and neuroimaging, this is most likely alzheimer's and vascular dementia    Continue Aricept and Namenda  The dose of Namenda XR can be increased to 28mg. She will follow up with her prescribing physician. Coronavirus pandemic:  I did discuss with the patient at length the importance of social distancing and proper hygiene especially during these times. The patient is at a higher risk of having a more severe course of the disease and needs to follow all CDC recommendations. The patient acknowledges. Subjective:   Ursula Cruz is a 66 y.o. female who presents to the neurology clinic [facility] for evaluation.   She was brought to the emergency department in late November 2020 due to worsening of her mental status. She does have a underlying history of dementia. When she was brought to the hospital there was intermittent episodes of staring spells and she was started on a low-dose Keppra for a possible new onset seizure. During the hospitalization she did have a brain MRI. There was a significant amount of atrophy and chronic small vessel ischemic changes. There is no acute stroke. Her LDL was 87, hemoglobin A1c 6.3, vitamin B12 751, and TSH 1.64. Her EEG revealed no focality for seizures. The differential for the events was a TIA versus a convulsive cardiogenic syncope versus a seizure. She was ultimately discharged on a low-dose Keppra for follow-up in the neurology clinic. Since that time, there has been no new numbness, tingling or weakness. Her daughter participated in the call today. We talked at length about her evolution of her dementia that dates back 10 years. There has been periods of stepwise decline. Since being home, there has been on seizure type events. She needed to stop keppra due to diarrhea. It was unclear if it was medication induced or a GI illness. They have not noticed any more episodes of confusion. She is having both good days and bad days. During a bad day she has very little in the way of interaction but other days she does much better. They have not been able to figure out what may be precipitating a good or bad day. Prior to Admission medications    Medication Sig Start Date End Date Taking? Authorizing Provider   aspirin 81 mg chewable tablet Take 1 Tab by mouth daily. 11/27/20  Yes Saran Caballero MD   levothyroxine (SYNTHROID) 100 mcg tablet Take 100 mcg by mouth Daily (before breakfast). Yes Provider, Historical   memantine ER (NAMENDA XR) 14 mg capsule Take 14 mg by mouth daily. Yes Provider, Historical   calcium-cholecalciferol, d3, 500 mg(1,250mg) -400 unit chew Take 2 Tabs by mouth daily.    Yes Provider, Historical   ascorbic acid, vitamin C, (Vitamin C) 500 mg chew Take 500 mg by mouth daily. Yes Provider, Historical   amLODIPine (NORVASC) 5 mg tablet Take 5 mg by mouth daily. Yes Provider, Historical   donepezil (ARICEPT) 5 mg tablet Take 5 mg by mouth nightly. Yes Provider, Historical   latanoprost (XALATAN) 0.005 % ophthalmic solution Administer 1 Drop to both eyes nightly. Yes Provider, Historical   lisinopril (PRINIVIL, ZESTRIL) 20 mg tablet Take 20 mg by mouth daily. Yes Provider, Historical   simvastatin (ZOCOR) 40 mg tablet Take 40 mg by mouth every evening. Yes Other, MD Marleny   levETIRAcetam (KEPPRA) 500 mg tablet Take 1 Tab by mouth two (2) times a day. 11/27/20   Papa Baron MD   aspirin/salicylamide/caffeine (BC HEADACHE POWDER PO) Take 0.5 Packets by mouth two (2) times daily as needed for Pain. Provider, Historical   naproxen sodium (ALEVE) 220 mg tablet Take 220 mg by mouth two (2) times daily as needed for Pain. Provider, Historical           General, constitutional: negative  Eyes, vision: negative  Ears, nose, throat: negative  Cardiovascular, heart: negative  Respiratory: negative  Gastrointestinal: negative  Genitourinary: negative  Musculoskeletal: negative  Skin and integumentary: negative  Psychiatric: negative  Endocrine: negative  Neurological: negative, except for HPI  Hematologic/lymphatic: negative  Allergy/immunology: negative    No flowsheet data found. Fabrizio Mcclure, who was evaluated through a patient-initiated, synchronous (real-time) audio only encounter, and/or her healthcare decision maker, is aware that it is a billable service, with coverage as determined by her insurance carrier. She provided verbal consent to proceed: yes. She has not had a related appointment within my department in the past 7 days or scheduled within the next 24 hours.       Total Time: 23 minutes    Abrahan Delgado DO FAAN                Past Medical History:   Diagnosis Date    CAD (coronary artery disease)     high cholesterol    Endocrine disease     thyroid    Heart failure (HCC)     MI    Psychiatric disorder     anxiety, depression        Past Surgical History:   Procedure Laterality Date    HX GYN      2 c sections    HX HEENT      tonsilectomy        Family History   Problem Relation Age of Onset    Hypertension Mother     Diabetes Mother     Emphysema Father     Asthma Daughter         Social History     Tobacco Use    Smoking status: Current Every Day Smoker     Packs/day: 0.50     Years: 10.00     Pack years: 5.00   Substance Use Topics    Alcohol use: No        Allergies   Allergen Reactions    Augmentin [Amoxicillin-Pot Clavulanate] Nausea Only     Abdominal pain    Belladonna Alkaloids Swelling     Swelling of tongue    Carbocaine [Mepivacaine] Other (comments)     Chest pain    Cortisone Other (comments)     depression    Decadron [Dexamethasone] Other (comments)     Altered mental status    Erythromycin Nausea Only    Meprobamate Swelling     tongue    Paxil [Paroxetine Hcl] Other (comments)     Headache, syncope    Vantin [Cefpodoxime] Unknown (comments)    Vibramycin Calcium Nausea Only    Zoloft [Sertraline] Other (comments)     Headache, syncope        Prior to Admission medications    Medication Sig Start Date End Date Taking? Authorizing Provider   levETIRAcetam (KEPPRA) 500 mg tablet Take 1 Tab by mouth two (2) times a day. 11/27/20   Gabby Ramirez MD   aspirin 81 mg chewable tablet Take 1 Tab by mouth daily. 11/27/20   Gabby Ramirez MD   levothyroxine (SYNTHROID) 100 mcg tablet Take 100 mcg by mouth Daily (before breakfast). Provider, Historical   memantine ER (NAMENDA XR) 14 mg capsule Take 14 mg by mouth daily. Provider, Historical   calcium-cholecalciferol, d3, 500 mg(1,250mg) -400 unit chew Take 2 Tabs by mouth daily. Provider, Historical   ascorbic acid, vitamin C, (Vitamin C) 500 mg chew Take 500 mg by mouth daily.     Provider, Historical aspirin/salicylamide/caffeine (BC HEADACHE POWDER PO) Take 0.5 Packets by mouth two (2) times daily as needed for Pain. Provider, Historical   naproxen sodium (ALEVE) 220 mg tablet Take 220 mg by mouth two (2) times daily as needed for Pain. Provider, Historical   amLODIPine (NORVASC) 5 mg tablet Take 5 mg by mouth daily. Provider, Historical   donepezil (ARICEPT) 5 mg tablet Take 5 mg by mouth nightly. Provider, Historical   latanoprost (XALATAN) 0.005 % ophthalmic solution Administer 1 Drop to both eyes nightly. Provider, Historical   lisinopril (PRINIVIL, ZESTRIL) 20 mg tablet Take 20 mg by mouth daily. Provider, Historical   simvastatin (ZOCOR) 40 mg tablet Take 40 mg by mouth every evening. Other, MD Marleny       Review of Systems:    General, constitutional: negative  Eyes, vision: negative  Ears, nose, throat: negative  Cardiovascular, heart: negative  Respiratory: negative  Gastrointestinal: negative  Genitourinary: negative  Musculoskeletal: negative  Skin and integumentary: negative  Psychiatric: negative  Endocrine: negative  Neurological: negative, except for HPI  Hematologic/lymphatic: negative  Allergy/immunology: negative      Objective: There were no vitals taken for this visit. No vital signs were obtained via telemedicine today. There are limitations to the neurological examination due to the technological features of telemedicine  Physical Exam:      General:  appears well nourished in no acute distress  Skin: intact  Respiratory; no labored breathing    Neurological exam:    The patient is awake and alert. She is oriented to her location. She did know the year or month. .  She was able to perform simple calculations. She did name objects correctly. She has decreased insight into her medical condition. She has better remote memory than recent memory. Her attention and concentration were limited. There was no dysarthria or aphasia.   Unchanged from yesterday    Cranial nerves:       Visual fields were full  Eomi, no evidence of nystagmus  Facial motor: normal and symmetric  Hearing intact    Tongue: midline without fasciculations    Motor:     No evidence of fasciculations    Strength testing:      Sensory:  She denies sensory loss    Reflexes:  Unable to obtain via telemedicine    Cerebellar testing:  no tremor apparent, finger/nose and fredrick were intact    Romberg: absent    Gait: steady. Labs:     Lab Results   Component Value Date/Time    Hemoglobin A1c 6.3 (H) 11/25/2020 01:13 AM    Sodium 138 11/25/2020 01:13 AM    Potassium 4.0 11/25/2020 01:13 AM    Chloride 107 11/25/2020 01:13 AM    Glucose 108 (H) 11/25/2020 01:13 AM    BUN 15 11/25/2020 01:13 AM    Creatinine 0.98 11/25/2020 01:13 AM    Calcium 9.0 11/25/2020 01:13 AM    WBC 10.1 11/25/2020 01:13 AM    HCT 44.9 11/25/2020 01:13 AM    HGB 14.2 11/25/2020 01:13 AM    PLATELET 216 78/93/7834 01:13 AM       Imaging:    Results from Hospital Encounter encounter on 11/24/20   MRI BRAIN WO CONT    Narrative EXAM: MRI BRAIN WO CONT    TECHNIQUE: Brain images including sagittal and axial T1-weighted, axial FLAIR,  T2-weighted, diffusion weighted, gradient echo,  coronal T2, thin section  high-resolution coronal T2-weighted images of the temporal lobes    IV CONTRAST:  None    INDICATION:  ?seizure    COMPARISON:  CT head of 11/24/2020, brain MRI of 12/23/2010    FINDINGS:  BRAIN PARENCHYMA:  No acute infarct. Extensive confluent and scattered foci of  FLAIR/T2 hyperintensity in the cerebral white matter and moderate hyperintensity  in the sudeep, consistent with intracranial small vessel disease. Some progression  since 2010. No chronic major territorial infarct. No mass. INTRACRANIAL HEMORRHAGE: None. CSF SPACES:  Normal ventricular size other than prominence of the temporal  horns. Prominence of the sylvian fissures.  Findings consistent with cerebral  volume loss, particularly in the temporal lobes.  BASAL CISTERNS:  Patent. MIDLINE SHIFT: None. VASCULAR SYSTEM:  Normal flow voids. PARANASAL SINUSES AND MASTOID AIR CELLS:  No significant opacification. VISUALIZED ORBITS:  No significant abnormalities. VISUALIZED UPPER CERVICAL SPINE:  No significant abnormalities. SELLA:  No enlargement or  focal abnormality. SKULL BASE:  No significant abnormalities. Cerebellar tonsils in normal  position. CALVARIUM:  Intact. Impression IMPRESSION:    1. No acute infarct. 2. Moderately extensive cerebral white matter signal abnormality and signal  abnormality in the sudeep, most consistent with chronic small vessel ischemic  change. Volume loss, particularly in the temporal lobes. Results from [place] encounter on 11/24/20   CT HEAD WO CONT    Narrative EXAM: CT HEAD WO CONT    INDICATION: Nonverbal and difficulty following commands since last night. Baseline dementia. Recent UTI. COMPARISON: CT head on 7/6/2019. MRI brain on 12/23/2010. TECHNIQUE: Noncontrast head CT. Coronal and sagittal reformats. CT dose  reduction was achieved through the use of a standardized protocol tailored for  this examination and automatic exposure control for dose modulation. FINDINGS: The ventricles and sulci are age-appropriate without hydrocephalus. There is no mass effect or midline shift. There is no intracranial hemorrhage or  extra-axial fluid collection. Chronic microvascular ischemic disease is  unchanged in greatest in the bilateral frontal and parietal periventricular  white matter. No CT evidence of acute infarct    The calvarium is intact. The visualized paranasal sinuses and mastoid air cells  are clear. Impression IMPRESSION:     Chronic microvascular ischemic disease. No change. No acute process on  noncontrast CT head.                Assessment and Plan:    The patient is a pleasant 61-year-old female with history of prior stroke and dementia who presents with an episode of unresponsiveness x 2. Her neurological examination today does reveal cognitive impairment no focal weakness.     Acute loss of consciousness: The differential for this does include TIA versus convulsive cardiogenic syncope versus a seizure.     The brain MRI revealed no evidence of an acute stroke  Her EEG revealed no focality for seizures.     Given that she has had 2 events and reasons for seizures due to her dementia, I would recommend continuing with Keppra 500 mg twice a day. will obtain an ambulatory or sleep deprived EEG.         Risk of vascular disease:     She does have risk factors for stroke including hypertension and dyslipidemia  Continue aspirin daily  Hypertension: Goal systolic blood pressure under 140  Dyslipidemia: Continue current statin therapy     Dementia:  Continue Aricept and Namenda       Patient Active Problem List   Diagnosis Code    Hyposmolality and/or hyponatremia E87.1    Hypokalemia E87.6    HTN (hypertension) I10    Unspecified hypothyroidism E03.9    Other and unspecified hyperlipidemia E78.5    Depressive disorder, not elsewhere classified F32.9    Essential hypertension, benign I10    Acute confusion R41.0                   Signed By:  Romana Needle, DO FAAN    December 15, 2020

## 2020-12-16 ENCOUNTER — VIRTUAL VISIT (OUTPATIENT)
Dept: NEUROLOGY | Age: 75
End: 2020-12-16
Payer: MEDICARE

## 2020-12-16 DIAGNOSIS — F01.50 VASCULAR DEMENTIA WITHOUT BEHAVIORAL DISTURBANCE (HCC): ICD-10-CM

## 2020-12-16 DIAGNOSIS — G30.1 LATE ONSET ALZHEIMER'S DISEASE WITHOUT BEHAVIORAL DISTURBANCE (HCC): ICD-10-CM

## 2020-12-16 DIAGNOSIS — R41.0 ACUTE CONFUSION: Primary | ICD-10-CM

## 2020-12-16 DIAGNOSIS — F02.80 LATE ONSET ALZHEIMER'S DISEASE WITHOUT BEHAVIORAL DISTURBANCE (HCC): ICD-10-CM

## 2020-12-16 PROCEDURE — 99443 PR PHYS/QHP TELEPHONE EVALUATION 21-30 MIN: CPT | Performed by: PSYCHIATRY & NEUROLOGY

## 2021-01-24 ENCOUNTER — HOSPITAL ENCOUNTER (INPATIENT)
Age: 76
LOS: 3 days | Discharge: HOME HEALTH | DRG: 689 | End: 2021-01-27
Attending: EMERGENCY MEDICINE | Admitting: STUDENT IN AN ORGANIZED HEALTH CARE EDUCATION/TRAINING PROGRAM
Payer: MEDICARE

## 2021-01-24 ENCOUNTER — APPOINTMENT (OUTPATIENT)
Dept: GENERAL RADIOLOGY | Age: 76
DRG: 689 | End: 2021-01-24
Attending: EMERGENCY MEDICINE
Payer: MEDICARE

## 2021-01-24 ENCOUNTER — APPOINTMENT (OUTPATIENT)
Dept: CT IMAGING | Age: 76
DRG: 689 | End: 2021-01-24
Attending: EMERGENCY MEDICINE
Payer: MEDICARE

## 2021-01-24 DIAGNOSIS — N30.00 ACUTE CYSTITIS WITHOUT HEMATURIA: Primary | ICD-10-CM

## 2021-01-24 DIAGNOSIS — G93.41 METABOLIC ENCEPHALOPATHY: ICD-10-CM

## 2021-01-24 PROBLEM — R41.0 ACUTE CONFUSION: Status: RESOLVED | Noted: 2020-11-24 | Resolved: 2021-01-24

## 2021-01-24 PROBLEM — N39.0 UTI (URINARY TRACT INFECTION): Status: ACTIVE | Noted: 2021-01-24

## 2021-01-24 LAB
ALBUMIN SERPL-MCNC: 3.2 G/DL (ref 3.5–5)
ALBUMIN/GLOB SERPL: 0.8 {RATIO} (ref 1.1–2.2)
ALP SERPL-CCNC: 109 U/L (ref 45–117)
ALT SERPL-CCNC: 20 U/L (ref 12–78)
ANION GAP SERPL CALC-SCNC: 4 MMOL/L (ref 5–15)
APPEARANCE UR: ABNORMAL
AST SERPL-CCNC: 13 U/L (ref 15–37)
ATRIAL RATE: 87 BPM
BACTERIA URNS QL MICRO: ABNORMAL /HPF
BASOPHILS # BLD: 0.1 K/UL (ref 0–0.1)
BASOPHILS NFR BLD: 1 % (ref 0–1)
BILIRUB SERPL-MCNC: 0.4 MG/DL (ref 0.2–1)
BILIRUB UR QL: NEGATIVE
BUN SERPL-MCNC: 15 MG/DL (ref 6–20)
BUN/CREAT SERPL: 12 (ref 12–20)
CALCIUM SERPL-MCNC: 9.3 MG/DL (ref 8.5–10.1)
CALCULATED P AXIS, ECG09: 6 DEGREES
CALCULATED R AXIS, ECG10: 4 DEGREES
CALCULATED T AXIS, ECG11: 47 DEGREES
CHLORIDE SERPL-SCNC: 106 MMOL/L (ref 97–108)
CO2 SERPL-SCNC: 31 MMOL/L (ref 21–32)
COLOR UR: ABNORMAL
COVID-19 RAPID TEST, COVR: NOT DETECTED
CREAT SERPL-MCNC: 1.24 MG/DL (ref 0.55–1.02)
DIAGNOSIS, 93000: NORMAL
DIFFERENTIAL METHOD BLD: ABNORMAL
EOSINOPHIL # BLD: 0 K/UL (ref 0–0.4)
EOSINOPHIL NFR BLD: 0 % (ref 0–7)
EPITH CASTS URNS QL MICRO: ABNORMAL /LPF
ERYTHROCYTE [DISTWIDTH] IN BLOOD BY AUTOMATED COUNT: 15.3 % (ref 11.5–14.5)
GLOBULIN SER CALC-MCNC: 3.8 G/DL (ref 2–4)
GLUCOSE SERPL-MCNC: 100 MG/DL (ref 65–100)
GLUCOSE UR STRIP.AUTO-MCNC: NEGATIVE MG/DL
HCT VFR BLD AUTO: 49.5 % (ref 35–47)
HGB BLD-MCNC: 15.9 G/DL (ref 11.5–16)
HGB UR QL STRIP: NEGATIVE
IMM GRANULOCYTES # BLD AUTO: 0.1 K/UL (ref 0–0.04)
IMM GRANULOCYTES NFR BLD AUTO: 1 % (ref 0–0.5)
KETONES UR QL STRIP.AUTO: ABNORMAL MG/DL
LEUKOCYTE ESTERASE UR QL STRIP.AUTO: ABNORMAL
LYMPHOCYTES # BLD: 0.6 K/UL (ref 0.8–3.5)
LYMPHOCYTES NFR BLD: 5 % (ref 12–49)
MCH RBC QN AUTO: 29 PG (ref 26–34)
MCHC RBC AUTO-ENTMCNC: 32.1 G/DL (ref 30–36.5)
MCV RBC AUTO: 90.2 FL (ref 80–99)
MONOCYTES # BLD: 0.8 K/UL (ref 0–1)
MONOCYTES NFR BLD: 6 % (ref 5–13)
NEUTS SEG # BLD: 11.2 K/UL (ref 1.8–8)
NEUTS SEG NFR BLD: 87 % (ref 32–75)
NITRITE UR QL STRIP.AUTO: NEGATIVE
NRBC # BLD: 0 K/UL (ref 0–0.01)
NRBC BLD-RTO: 0 PER 100 WBC
P-R INTERVAL, ECG05: 130 MS
PH UR STRIP: 6.5 [PH] (ref 5–8)
PLATELET # BLD AUTO: 211 K/UL (ref 150–400)
PMV BLD AUTO: 12 FL (ref 8.9–12.9)
POTASSIUM SERPL-SCNC: 3.5 MMOL/L (ref 3.5–5.1)
PROT SERPL-MCNC: 7 G/DL (ref 6.4–8.2)
PROT UR STRIP-MCNC: ABNORMAL MG/DL
Q-T INTERVAL, ECG07: 376 MS
QRS DURATION, ECG06: 76 MS
QTC CALCULATION (BEZET), ECG08: 452 MS
RBC # BLD AUTO: 5.49 M/UL (ref 3.8–5.2)
RBC #/AREA URNS HPF: ABNORMAL /HPF (ref 0–5)
RBC MORPH BLD: ABNORMAL
SARS-COV-2, COV2: NORMAL
SODIUM SERPL-SCNC: 141 MMOL/L (ref 136–145)
SOURCE, COVRS: NORMAL
SP GR UR REFRACTOMETRY: 1.02 (ref 1–1.03)
TROPONIN I SERPL-MCNC: <0.05 NG/ML
UA: UC IF INDICATED,UAUC: ABNORMAL
UROBILINOGEN UR QL STRIP.AUTO: 1 EU/DL (ref 0.2–1)
VENTRICULAR RATE, ECG03: 87 BPM
WBC # BLD AUTO: 12.8 K/UL (ref 3.6–11)
WBC URNS QL MICRO: ABNORMAL /HPF (ref 0–4)

## 2021-01-24 PROCEDURE — 85025 COMPLETE CBC W/AUTO DIFF WBC: CPT

## 2021-01-24 PROCEDURE — 65660000000 HC RM CCU STEPDOWN

## 2021-01-24 PROCEDURE — 74011250637 HC RX REV CODE- 250/637: Performed by: STUDENT IN AN ORGANIZED HEALTH CARE EDUCATION/TRAINING PROGRAM

## 2021-01-24 PROCEDURE — 99285 EMERGENCY DEPT VISIT HI MDM: CPT

## 2021-01-24 PROCEDURE — 84484 ASSAY OF TROPONIN QUANT: CPT

## 2021-01-24 PROCEDURE — 80053 COMPREHEN METABOLIC PANEL: CPT

## 2021-01-24 PROCEDURE — 74011000250 HC RX REV CODE- 250: Performed by: STUDENT IN AN ORGANIZED HEALTH CARE EDUCATION/TRAINING PROGRAM

## 2021-01-24 PROCEDURE — 96374 THER/PROPH/DIAG INJ IV PUSH: CPT

## 2021-01-24 PROCEDURE — 74011250636 HC RX REV CODE- 250/636: Performed by: EMERGENCY MEDICINE

## 2021-01-24 PROCEDURE — 71045 X-RAY EXAM CHEST 1 VIEW: CPT

## 2021-01-24 PROCEDURE — 70450 CT HEAD/BRAIN W/O DYE: CPT

## 2021-01-24 PROCEDURE — 93005 ELECTROCARDIOGRAM TRACING: CPT

## 2021-01-24 PROCEDURE — 87635 SARS-COV-2 COVID-19 AMP PRB: CPT

## 2021-01-24 PROCEDURE — 81001 URINALYSIS AUTO W/SCOPE: CPT

## 2021-01-24 PROCEDURE — 36415 COLL VENOUS BLD VENIPUNCTURE: CPT

## 2021-01-24 PROCEDURE — 51701 INSERT BLADDER CATHETER: CPT

## 2021-01-24 PROCEDURE — 87086 URINE CULTURE/COLONY COUNT: CPT

## 2021-01-24 RX ORDER — ONDANSETRON 2 MG/ML
4 INJECTION INTRAMUSCULAR; INTRAVENOUS
Status: DISCONTINUED | OUTPATIENT
Start: 2021-01-24 | End: 2021-01-27 | Stop reason: HOSPADM

## 2021-01-24 RX ORDER — LANOLIN ALCOHOL/MO/W.PET/CERES
500 CREAM (GRAM) TOPICAL DAILY
Status: DISCONTINUED | OUTPATIENT
Start: 2021-01-25 | End: 2021-01-24

## 2021-01-24 RX ORDER — LATANOPROST 50 UG/ML
1 SOLUTION/ DROPS OPHTHALMIC
Status: DISCONTINUED | OUTPATIENT
Start: 2021-01-24 | End: 2021-01-27 | Stop reason: HOSPADM

## 2021-01-24 RX ORDER — ACETAMINOPHEN 650 MG/1
650 SUPPOSITORY RECTAL
Status: DISCONTINUED | OUTPATIENT
Start: 2021-01-24 | End: 2021-01-27 | Stop reason: HOSPADM

## 2021-01-24 RX ORDER — FERROUS SULFATE, DRIED 160(50) MG
1 TABLET, EXTENDED RELEASE ORAL 2 TIMES DAILY WITH MEALS
Status: DISCONTINUED | OUTPATIENT
Start: 2021-01-24 | End: 2021-01-27 | Stop reason: HOSPADM

## 2021-01-24 RX ORDER — ATORVASTATIN CALCIUM 20 MG/1
20 TABLET, FILM COATED ORAL DAILY
Status: DISCONTINUED | OUTPATIENT
Start: 2021-01-25 | End: 2021-01-27 | Stop reason: HOSPADM

## 2021-01-24 RX ORDER — GUAIFENESIN 100 MG/5ML
81 LIQUID (ML) ORAL DAILY
Status: DISCONTINUED | OUTPATIENT
Start: 2021-01-25 | End: 2021-01-27 | Stop reason: HOSPADM

## 2021-01-24 RX ORDER — DONEPEZIL HYDROCHLORIDE 5 MG/1
5 TABLET, FILM COATED ORAL
Status: DISCONTINUED | OUTPATIENT
Start: 2021-01-24 | End: 2021-01-27 | Stop reason: HOSPADM

## 2021-01-24 RX ORDER — LEVETIRACETAM 500 MG/1
500 TABLET ORAL 2 TIMES DAILY
Status: DISCONTINUED | OUTPATIENT
Start: 2021-01-24 | End: 2021-01-26

## 2021-01-24 RX ORDER — ACETAMINOPHEN 325 MG/1
650 TABLET ORAL
Status: DISCONTINUED | OUTPATIENT
Start: 2021-01-24 | End: 2021-01-27 | Stop reason: HOSPADM

## 2021-01-24 RX ORDER — ENOXAPARIN SODIUM 100 MG/ML
40 INJECTION SUBCUTANEOUS DAILY
Status: DISCONTINUED | OUTPATIENT
Start: 2021-01-25 | End: 2021-01-27 | Stop reason: HOSPADM

## 2021-01-24 RX ORDER — MEMANTINE HYDROCHLORIDE 5 MG/1
5 TABLET ORAL 2 TIMES DAILY
Status: DISCONTINUED | OUTPATIENT
Start: 2021-01-24 | End: 2021-01-27 | Stop reason: HOSPADM

## 2021-01-24 RX ORDER — LEVOFLOXACIN 5 MG/ML
750 INJECTION, SOLUTION INTRAVENOUS
Status: COMPLETED | OUTPATIENT
Start: 2021-01-24 | End: 2021-01-24

## 2021-01-24 RX ORDER — POLYETHYLENE GLYCOL 3350 17 G/17G
17 POWDER, FOR SOLUTION ORAL DAILY PRN
Status: DISCONTINUED | OUTPATIENT
Start: 2021-01-24 | End: 2021-01-27 | Stop reason: HOSPADM

## 2021-01-24 RX ORDER — SODIUM CHLORIDE 0.9 % (FLUSH) 0.9 %
5-40 SYRINGE (ML) INJECTION EVERY 8 HOURS
Status: DISCONTINUED | OUTPATIENT
Start: 2021-01-24 | End: 2021-01-27 | Stop reason: HOSPADM

## 2021-01-24 RX ORDER — ASCORBIC ACID 500 MG
500 TABLET ORAL DAILY
Status: DISCONTINUED | OUTPATIENT
Start: 2021-01-25 | End: 2021-01-27 | Stop reason: HOSPADM

## 2021-01-24 RX ORDER — SODIUM CHLORIDE 0.9 % (FLUSH) 0.9 %
5-40 SYRINGE (ML) INJECTION AS NEEDED
Status: DISCONTINUED | OUTPATIENT
Start: 2021-01-24 | End: 2021-01-27 | Stop reason: HOSPADM

## 2021-01-24 RX ORDER — LEVOFLOXACIN 5 MG/ML
750 INJECTION, SOLUTION INTRAVENOUS
Status: COMPLETED | OUTPATIENT
Start: 2021-01-26 | End: 2021-01-26

## 2021-01-24 RX ORDER — PROMETHAZINE HYDROCHLORIDE 25 MG/1
12.5 TABLET ORAL
Status: DISCONTINUED | OUTPATIENT
Start: 2021-01-24 | End: 2021-01-27 | Stop reason: HOSPADM

## 2021-01-24 RX ORDER — LISINOPRIL 20 MG/1
20 TABLET ORAL DAILY
Status: DISCONTINUED | OUTPATIENT
Start: 2021-01-25 | End: 2021-01-27 | Stop reason: HOSPADM

## 2021-01-24 RX ORDER — AMLODIPINE BESYLATE 5 MG/1
5 TABLET ORAL DAILY
Status: DISCONTINUED | OUTPATIENT
Start: 2021-01-25 | End: 2021-01-27 | Stop reason: HOSPADM

## 2021-01-24 RX ORDER — LEVOTHYROXINE SODIUM 100 UG/1
100 TABLET ORAL
Status: DISCONTINUED | OUTPATIENT
Start: 2021-01-25 | End: 2021-01-27 | Stop reason: HOSPADM

## 2021-01-24 RX ADMIN — SODIUM CHLORIDE 1000 ML: 9 INJECTION, SOLUTION INTRAVENOUS at 12:41

## 2021-01-24 RX ADMIN — Medication 10 ML: at 19:14

## 2021-01-24 RX ADMIN — CALCIUM CARBONATE-VITAMIN D TAB 500 MG-200 UNIT 1 TABLET: 500-200 TAB at 19:13

## 2021-01-24 RX ADMIN — DONEPEZIL HYDROCHLORIDE 5 MG: 5 TABLET, FILM COATED ORAL at 21:43

## 2021-01-24 RX ADMIN — LATANOPROST 1 DROP: 50 SOLUTION OPHTHALMIC at 22:23

## 2021-01-24 RX ADMIN — LEVOFLOXACIN 750 MG: 5 INJECTION, SOLUTION INTRAVENOUS at 12:41

## 2021-01-24 RX ADMIN — LEVETIRACETAM 500 MG: 500 TABLET ORAL at 19:13

## 2021-01-24 RX ADMIN — MEMANTINE HYDROCHLORIDE 5 MG: 5 TABLET ORAL at 19:13

## 2021-01-24 NOTE — H&P
Hospitalist Admission Note    NAME: Chrys Sandifer   :  1945   MRN:  [id number]     Date/Time:  2021 2:42 PM    Patient PCP: Sloane Santos MD  ______________________________________________________________________  Given the patient's current clinical presentation, I have a high level of concern for decompensation if discharged from the emergency department. Complex decision making was performed, which includes reviewing the patient's available past medical records, laboratory results, and x-ray films. My assessment of this patient's clinical condition and my plan of care is as follows. Assessment / Plan:    Ground-level fall  -Unclear weather mechanical fall vs syncope. Found on the fall by partner. Patient has no recollection/demented. -Head CT negative for acute intracranial process. Shows severe white matter disease with multiple stable lacunar infarcts. -EKG negative for acute ischemic changes. Trop neg x1.  -Rapid Covid test negative  -Check orthostatic vital signs  -Telemetry monitoring  -PT/OT consult    Urinary tract infection  -UA with bacteriuria and pyuria, +leukocyte esterase  -Started on Levaquin. Allergic to cephalosporins. We will continue Levaquin  -Follow-up urine culture result    Hypertension  Coronary artery disease  -Continue amlodipine, lisinopril, aspirin, Lipitor    History of seizure: Continue Keppra    CKD stage III: Creatinine at baseline. Avoid nephrotoxins. Monitor BMP    History of stroke: Continue aspirin and statin  Dementia: continue memantine and donepezil. Monitor QT while on levaquin. Hypothyroidism: cobntinue syntroid    Code Status: DNR  Surrogate Decision Maker:    DVT Prophylaxis:   GI Prophylaxis: not indicated    Baseline: ambulatory      Subjective:   CHIEF COMPLAINT: Fall vs syncope    HISTORY OF PRESENT ILLNESS:     Chrys Sandifer is a 66 y.o.    female with past medical history significant for CVA and dementia who presents with ground-level fall. Patient has dementia. She is confused at baseline and does not have recollection of the event. Does not know why she is in the hospital.  Hx taked from daughter Capo Curry over the phone. Per daughter, patient sleeps with her partner. This morning she was noted to have urinated on herself and seemed more disoriented than usual.  She was later found on the floor. Details of the fall unknown. Over the last few days she was also noted to have poor oral intake and nausea. Patient states she feels well and denies dysuria, frequency, fever, cough, chest pain, diarrhea, or abdominal pain. .  Patient was admitted here in November for altered mental status and seizure-like activity. She was discharged on Keppra. We were asked to admit for work up and evaluation of the above problems.      Past Medical History:   Diagnosis Date    CAD (coronary artery disease)     high cholesterol    Dementia (HCC)     Endocrine disease     thyroid    Heart failure (HCC)     MI    Psychiatric disorder     anxiety, depression        Past Surgical History:   Procedure Laterality Date    HX GYN      2 c sections    HX HEENT      tonsilectomy       Social History     Tobacco Use    Smoking status: Current Every Day Smoker     Packs/day: 0.50     Years: 10.00     Pack years: 5.00    Smokeless tobacco: Former User   Substance Use Topics    Alcohol use: No        Family History   Problem Relation Age of Onset    Hypertension Mother     Diabetes Mother     Emphysema Father     Asthma Daughter      Allergies   Allergen Reactions    Augmentin [Amoxicillin-Pot Clavulanate] Nausea Only     Abdominal pain    Belladonna Alkaloids Swelling     Swelling of tongue    Carbocaine [Mepivacaine] Other (comments)     Chest pain    Cortisone Other (comments)     depression    Decadron [Dexamethasone] Other (comments)     Altered mental status    Erythromycin Nausea Only    Meprobamate Swelling     tongue    Paxil [Paroxetine Hcl] Other (comments)     Headache, syncope    Vantin [Cefpodoxime] Unknown (comments)    Vibramycin Calcium Nausea Only    Zoloft [Sertraline] Other (comments)     Headache, syncope        Prior to Admission medications    Medication Sig Start Date End Date Taking? Authorizing Provider   levETIRAcetam (KEPPRA) 500 mg tablet Take 1 Tab by mouth two (2) times a day. 11/27/20   Ana Armstrong MD   aspirin 81 mg chewable tablet Take 1 Tab by mouth daily. 11/27/20   Ana Armstrong MD   levothyroxine (SYNTHROID) 100 mcg tablet Take 100 mcg by mouth Daily (before breakfast). Provider, Historical   memantine ER (NAMENDA XR) 14 mg capsule Take 14 mg by mouth daily. Provider, Historical   calcium-cholecalciferol, d3, 500 mg(1,250mg) -400 unit chew Take 2 Tabs by mouth daily. Provider, Historical   ascorbic acid, vitamin C, (Vitamin C) 500 mg chew Take 500 mg by mouth daily. Provider, Historical   aspirin/salicylamide/caffeine (BC HEADACHE POWDER PO) Take 0.5 Packets by mouth two (2) times daily as needed for Pain. Provider, Historical   naproxen sodium (ALEVE) 220 mg tablet Take 220 mg by mouth two (2) times daily as needed for Pain. Provider, Historical   amLODIPine (NORVASC) 5 mg tablet Take 5 mg by mouth daily. Provider, Historical   donepezil (ARICEPT) 5 mg tablet Take 5 mg by mouth nightly. Provider, Historical   latanoprost (XALATAN) 0.005 % ophthalmic solution Administer 1 Drop to both eyes nightly. Provider, Historical   lisinopril (PRINIVIL, ZESTRIL) 20 mg tablet Take 20 mg by mouth daily. Provider, Historical   simvastatin (ZOCOR) 40 mg tablet Take 40 mg by mouth every evening. Marleny Velasquez MD       REVIEW OF SYSTEMS:     I am not able to complete the review of systems because:    The patient is intubated and sedated    The patient has altered mental status due to his acute medical problems    The patient has baseline aphasia from prior stroke(s)    The patient has baseline dementia and is not reliable historian    The patient is in acute medical distress and unable to provide information           Total of 12 systems reviewed as follows:       POSITIVE= underlined text  Negative = text not underlined  General:  fever, chills, sweats, generalized weakness, weight loss/gain,      loss of appetite   Eyes:    blurred vision, eye pain, loss of vision, double vision  ENT:    rhinorrhea, pharyngitis   Respiratory:   cough, sputum production, SOB, WETZEL, wheezing, pleuritic pain   Cardiology:   chest pain, palpitations, orthopnea, PND, edema, syncope   Gastrointestinal:  abdominal pain , N/V, diarrhea, dysphagia, constipation, bleeding   Genitourinary:  frequency, urgency, dysuria, hematuria, incontinence   Muskuloskeletal :  arthralgia, myalgia, back pain  Hematology:  easy bruising, nose or gum bleeding, lymphadenopathy   Dermatological: rash, ulceration, pruritis, color change / jaundice  Endocrine:   hot flashes or polydipsia   Neurological:  headache, dizziness, confusion, focal weakness, paresthesia,     Speech difficulties, memory loss, gait difficulty  Psychological: Feelings of anxiety, depression, agitation    Objective:   VITALS:    Visit Vitals  BP (!) 148/59 (BP Patient Position: At rest)   Pulse 88   Temp 98.2 °F (36.8 °C)   Resp 21   Ht 5' 3\" (1.6 m)   Wt 72.2 kg (159 lb 2.8 oz)   SpO2 100%   BMI 28.20 kg/m²       PHYSICAL EXAM:    General:    Alert, cooperative, no distress, appears stated age. HEENT: Atraumatic, anicteric sclerae, pink conjunctivae     No oral ulcers, mucosa moist, throat clear, dentition fair  Neck:  Supple, symmetrical,  thyroid: non tender  Lungs:   Clear to auscultation bilaterally. No Wheezing or Rhonchi. No rales. Chest wall:  No tenderness  No Accessory muscle use. Heart:   Regular  rhythm,  No  murmur   No edema  Abdomen:   Soft, non-tender. Not distended. Bowel sounds normal  Extremities: No cyanosis.   No clubbing,      Skin turgor normal, Capillary refill normal, Radial dial pulse 2+  Skin:     Not pale. Not Jaundiced  No rashes   Psych:   Not anxious or agitated. Neurologic: EOMs intact. No facial asymmetry. No aphasia or slurred speech. Symmetrical strength, Sensation grossly intact. Alert and oriented X 1. Oriented to self only. Does not know where she is at. Talkative but confused. _______________________________________________________________________  Care Plan discussed with:    Comments   Patient x    Family      RN     Care Manager                    Consultant:      _______________________________________________________________________  Expected  Disposition:   Home with Family x   HH/PT/OT/RN    SNF/LTC    MARJORIE    ________________________________________________________________________  TOTAL TIME:  54 Minutes    Critical Care Provided     Minutes non procedure based      Comments    x Reviewed previous records   >50% of visit spent in counseling and coordination of care x Discussion with patient and/or family and questions answered       ________________________________________________________________________  Signed: Merlin Lagos, MD    Procedures: see electronic medical records for all procedures/Xrays and details which were not copied into this note but were reviewed prior to creation of Plan.     LAB DATA REVIEWED:    Recent Results (from the past 24 hour(s))   EKG, 12 LEAD, INITIAL    Collection Time: 01/24/21 10:39 AM   Result Value Ref Range    Ventricular Rate 87 BPM    Atrial Rate 87 BPM    P-R Interval 130 ms    QRS Duration 76 ms    Q-T Interval 376 ms    QTC Calculation (Bezet) 452 ms    Calculated P Axis 6 degrees    Calculated R Axis 4 degrees    Calculated T Axis 47 degrees    Diagnosis       Normal sinus rhythm  Cannot rule out Anterior infarct , age undetermined  When compared with ECG of 24-NOV-2020 11:19,  No significant change was found     URINALYSIS W/ REFLEX CULTURE    Collection Time: 01/24/21 10:54 AM    Specimen: Urine   Result Value Ref Range    Color YELLOW/STRAW      Appearance CLOUDY (A) CLEAR      Specific gravity 1.016 1.003 - 1.030      pH (UA) 6.5 5.0 - 8.0      Protein TRACE (A) NEG mg/dL    Glucose Negative NEG mg/dL    Ketone TRACE (A) NEG mg/dL    Bilirubin Negative NEG      Blood Negative NEG      Urobilinogen 1.0 0.2 - 1.0 EU/dL    Nitrites Negative NEG      Leukocyte Esterase LARGE (A) NEG      WBC 20-50 0 - 4 /hpf    RBC 5-10 0 - 5 /hpf    Epithelial cells FEW FEW /lpf    Bacteria 2+ (A) NEG /hpf    UA:UC IF INDICATED URINE CULTURE ORDERED (A) CNI     CBC WITH AUTOMATED DIFF    Collection Time: 01/24/21 11:50 AM   Result Value Ref Range    WBC 12.8 (H) 3.6 - 11.0 K/uL    RBC 5.49 (H) 3.80 - 5.20 M/uL    HGB 15.9 11.5 - 16.0 g/dL    HCT 49.5 (H) 35.0 - 47.0 %    MCV 90.2 80.0 - 99.0 FL    MCH 29.0 26.0 - 34.0 PG    MCHC 32.1 30.0 - 36.5 g/dL    RDW 15.3 (H) 11.5 - 14.5 %    PLATELET 846 328 - 746 K/uL    MPV 12.0 8.9 - 12.9 FL    NRBC 0.0 0  WBC    ABSOLUTE NRBC 0.00 0.00 - 0.01 K/uL    NEUTROPHILS 87 (H) 32 - 75 %    LYMPHOCYTES 5 (L) 12 - 49 %    MONOCYTES 6 5 - 13 %    EOSINOPHILS 0 0 - 7 %    BASOPHILS 1 0 - 1 %    IMMATURE GRANULOCYTES 1 (H) 0.0 - 0.5 %    ABS. NEUTROPHILS 11.2 (H) 1.8 - 8.0 K/UL    ABS. LYMPHOCYTES 0.6 (L) 0.8 - 3.5 K/UL    ABS. MONOCYTES 0.8 0.0 - 1.0 K/UL    ABS. EOSINOPHILS 0.0 0.0 - 0.4 K/UL    ABS. BASOPHILS 0.1 0.0 - 0.1 K/UL    ABS. IMM.  GRANS. 0.1 (H) 0.00 - 0.04 K/UL    DF SMEAR SCANNED      RBC COMMENTS NORMOCYTIC, NORMOCHROMIC     METABOLIC PANEL, COMPREHENSIVE    Collection Time: 01/24/21 11:50 AM   Result Value Ref Range    Sodium 141 136 - 145 mmol/L    Potassium 3.5 3.5 - 5.1 mmol/L    Chloride 106 97 - 108 mmol/L    CO2 31 21 - 32 mmol/L    Anion gap 4 (L) 5 - 15 mmol/L    Glucose 100 65 - 100 mg/dL    BUN 15 6 - 20 MG/DL    Creatinine 1.24 (H) 0.55 - 1.02 MG/DL    BUN/Creatinine ratio 12 12 - 20      GFR est AA 51 (L) >60 ml/min/1.73m2    GFR est non-AA 42 (L) >60 ml/min/1.73m2    Calcium 9.3 8.5 - 10.1 MG/DL    Bilirubin, total 0.4 0.2 - 1.0 MG/DL    ALT (SGPT) 20 12 - 78 U/L    AST (SGOT) 13 (L) 15 - 37 U/L    Alk.  phosphatase 109 45 - 117 U/L    Protein, total 7.0 6.4 - 8.2 g/dL    Albumin 3.2 (L) 3.5 - 5.0 g/dL    Globulin 3.8 2.0 - 4.0 g/dL    A-G Ratio 0.8 (L) 1.1 - 2.2     TROPONIN I    Collection Time: 01/24/21 11:50 AM   Result Value Ref Range    Troponin-I, Qt. <0.05 <0.05 ng/mL   SARS-COV-2    Collection Time: 01/24/21 12:54 PM   Result Value Ref Range    SARS-CoV-2 Please find results under separate order     COVID-19 RAPID TEST    Collection Time: 01/24/21 12:54 PM   Result Value Ref Range    Specimen source Nasopharyngeal      COVID-19 rapid test Not detected NOTD

## 2021-01-24 NOTE — ED TRIAGE NOTES
Pt arrives via EMS from home post GLF. Pt then walked to living room and became dizzy and pale and c/o nausea. Spouse states po intake has been low and her urine is dark in color. Currently being treated for \"kidney problem\".

## 2021-01-24 NOTE — PROGRESS NOTES
Pharmacy Automatic Renal Dosing Protocol - Antimicrobials    Indication for Antimicrobials: UTI     Current Regimen of Each Antimicrobial:  Levofloxacin 750 mg IV q24hr (Start Date ; Day # 1)    Previous Antimicrobial Therapy:    Significant Cultures:    urine: pending    Radiology / Imaging results: (X-ray, CT scan or MRI):     Labs:  Recent Labs     21  1150   CREA 1.24*   BUN 15   WBC 12.8*     Temp (24hrs), Av.7 °F (36.5 °C), Min:96.8 °F (36 °C), Max:98.2 °F (36.8 °C)      Is the Patient on Dialysis? No    Creatinine Clearance (mL/min):   CrCl (Actual Body Weight): 43.3  CrCl (Adjusted Body Weight): 36.2  CrCl (Ideal Body Weight): 31.4    Impression/Plan:   Levofloxacin dose adjusted to 750 mg IV q48hr  Antimicrobial stop date 3 days     Pharmacy will follow daily and adjust medications as appropriate for renal function and/or serum levels.     Thank you,  Marcia Ag, PHARMD

## 2021-01-24 NOTE — ROUTINE PROCESS
TRANSFER - OUT REPORT: 
 
Verbal report given to [facility] RN on Richa Garcia  being transferred to [facility](unit) for routine progression of care Report consisted of patients Situation, Background, Assessment and  
Recommendations(SBAR). Information from the following report(s) SBAR was reviewed with the receiving nurse. Lines:  
Peripheral IV 01/24/21 Left Antecubital (Active) Site Assessment Clean, dry, & intact 01/24/21 1203 Phlebitis Assessment 0 01/24/21 1203 Infiltration Assessment 0 01/24/21 1203 Dressing Status Clean, dry, & intact 01/24/21 1203 Dressing Type Transparent 01/24/21 1203 Opportunity for questions and clarification was provided.

## 2021-01-24 NOTE — ED PROVIDER NOTES
EMERGENCY DEPARTMENT HISTORY AND PHYSICAL EXAM      Date: 1/24/2021  Patient Name: Liudmila You    Please note that this dictation was completed with [employer], the computer voice recognition software. Quite often unanticipated grammatical, syntax, homophones, and other interpretive errors are inadvertently transcribed by the computer software. Please disregard these errors. Please excuse any errors that have escaped final proofreading. History of Presenting Illness     Chief Complaint   Patient presents with    Fall    Dizziness       History Provided By: Patient     HPI: Liudmila You, 66 y.o. female, with a past medical history significant for dementia, history of stroke, possible seizure activity. She is presenting to the emergency department today with what is either a fall versus syncope. History of present illness limited by patient's confusion. Patient spouse states that she came into the living room and became pale and dizzy and then fell to the ground. Unsure if she lost consciousness. Spouse states poor p.o. intake and dark-colored urine. Patient denies any pain at this time, does report that she was feeling dizzy earlier, but cannot further describe. HPI and review of systems limited secondary to patient's confusion. Patient currently denies any chest pain, shortness of breath, nausea or vomiting, abdominal pain, headache. No witnessed seizure activity, does have a history of seizures. PCP: Mohan Posadas MD    No current facility-administered medications on file prior to encounter. Current Outpatient Medications on File Prior to Encounter   Medication Sig Dispense Refill    levETIRAcetam (KEPPRA) 500 mg tablet Take 1 Tab by mouth two (2) times a day. 60 Tab 0    aspirin 81 mg chewable tablet Take 1 Tab by mouth daily. 30 Tab 0    levothyroxine (SYNTHROID) 100 mcg tablet Take 100 mcg by mouth Daily (before breakfast).       memantine ER (NAMENDA XR) 14 mg capsule Take 14 mg by mouth daily.  calcium-cholecalciferol, d3, 500 mg(1,250mg) -400 unit chew Take 2 Tabs by mouth daily.  ascorbic acid, vitamin C, (Vitamin C) 500 mg chew Take 500 mg by mouth daily.  aspirin/salicylamide/caffeine (BC HEADACHE POWDER PO) Take 0.5 Packets by mouth two (2) times daily as needed for Pain.  naproxen sodium (ALEVE) 220 mg tablet Take 220 mg by mouth two (2) times daily as needed for Pain.  amLODIPine (NORVASC) 5 mg tablet Take 5 mg by mouth daily.  donepezil (ARICEPT) 5 mg tablet Take 5 mg by mouth nightly.  latanoprost (XALATAN) 0.005 % ophthalmic solution Administer 1 Drop to both eyes nightly.  lisinopril (PRINIVIL, ZESTRIL) 20 mg tablet Take 20 mg by mouth daily.  simvastatin (ZOCOR) 40 mg tablet Take 40 mg by mouth every evening. Past History     Past Medical History:  Past Medical History:   Diagnosis Date    CAD (coronary artery disease)     high cholesterol    Dementia (HCC)     Endocrine disease     thyroid    Heart failure (HCC)     MI    Psychiatric disorder     anxiety, depression       Past Surgical History:  Past Surgical History:   Procedure Laterality Date    HX GYN      2 c sections    HX HEENT      tonsilectomy       Family History:  Family History   Problem Relation Age of Onset    Hypertension Mother     Diabetes Mother     Emphysema Father     Asthma Daughter        Social History:  Social History     Tobacco Use    Smoking status: Current Every Day Smoker     Packs/day: 0.50     Years: 10.00     Pack years: 5.00    Smokeless tobacco: Former User   Substance Use Topics    Alcohol use: No    Drug use: No       Allergies:   Allergies   Allergen Reactions    Augmentin [Amoxicillin-Pot Clavulanate] Nausea Only     Abdominal pain    Belladonna Alkaloids Swelling     Swelling of tongue    Carbocaine [Mepivacaine] Other (comments)     Chest pain    Cortisone Other (comments)     depression    Decadron [Dexamethasone] Other (comments)     Altered mental status    Erythromycin Nausea Only    Meprobamate Swelling     tongue    Paxil [Paroxetine Hcl] Other (comments)     Headache, syncope    Vantin [Cefpodoxime] Unknown (comments)    Vibramycin Calcium Nausea Only    Zoloft [Sertraline] Other (comments)     Headache, syncope         Review of Systems   Review of Systems   Unable to perform ROS: Mental status change       Physical Exam   Physical Exam  Vitals signs and nursing note reviewed. Constitutional:       Appearance: She is well-developed. HENT:      Head: Normocephalic and atraumatic. Eyes:      General:         Right eye: No discharge. Left eye: No discharge. Conjunctiva/sclera: Conjunctivae normal.      Pupils: Pupils are equal, round, and reactive to light. Neck:      Musculoskeletal: Normal range of motion and neck supple. Trachea: No tracheal deviation. Cardiovascular:      Rate and Rhythm: Normal rate and regular rhythm. Heart sounds: Normal heart sounds. No murmur. Pulmonary:      Effort: Pulmonary effort is normal. No respiratory distress. Breath sounds: Normal breath sounds. No wheezing or rales. Abdominal:      General: Bowel sounds are normal.      Palpations: Abdomen is soft. Tenderness: There is no abdominal tenderness. There is no guarding or rebound. Musculoskeletal: Normal range of motion. General: No tenderness or deformity. Skin:     General: Skin is warm and dry. Findings: No erythema or rash. Neurological:      Mental Status: She is alert. Comments: Patient is awake and alert, she is pleasant, but confused. Oriented only to self, but not place time or situation. No facial droop, no slurring speech, equal strength in the upper and lower extremities.    Psychiatric:         Behavior: Behavior normal.         Diagnostic Study Results     Labs -     No results found for this or any previous visit (from the past 12 hour(s)). Radiologic Studies -   XR CHEST PORT   Final Result   Right lung interstitial process could be the result of pulmonary   edema, interstitial or atypical pneumonia      CT HEAD WO CONT   Final Result   No evidence for acute intracranial trauma   Severe white matter disease with multiple stable lacunar infarcts   Vertebral basilar calcification, can result in an insufficiency syndrome              CT Results  (Last 48 hours)    None        CXR Results  (Last 48 hours)    None            Medical Decision Making   I am the first provider for this patient. I reviewed the vital signs, available nursing notes, past medical history, past surgical history, family history and social history. Vital Signs-Reviewed the patient's vital signs. Patient Vitals for the past 12 hrs:   Temp Pulse Resp BP SpO2   01/27/21 0428 97.4 °F (36.3 °C) 84 16 113/65 92 %   01/26/21 2347 97 °F (36.1 °C) 82 18 115/64 93 %   01/26/21 2300     93 %   01/26/21 2018 98.5 °F (36.9 °C) 63 16 (!) 165/77 92 %       EKG interpretation: (Preliminary)  EKG shows sinus rhythm, rate 87. Leftward axis. No evidence of acute ischemic ST-T wave changes. Interpreted by me    Records Reviewed:   Nursing notes, Prior visits     Provider Notes (Medical Decision Making):   Patient presenting with what is likely syncope versus a fall, more likely syncope. No reports of seizure. Patient is confused on exam, encephalopathy likely multifactorial, could be related to patient's history of dementia, but may also be secondary to acute infection or other metabolic process. Will initiate broad work-up. Will obtain CT of the head. Disposition likely to hospitalization. ED Course:   Initial assessment performed. The patients presenting problems have been discussed, and they are in agreement with the care plan formulated and outlined with them. I have encouraged them to ask questions as they arise throughout their visit.                  Critical Care Time:   none    Disposition:  Patient is being admitted to the hospital by Hospitalist.  The results of their tests and reasons for their admission have been discussed with them and/or available family.  They convey agreement and understanding for the need to be admitted and for their admission diagnosis.  Consultation has been made with the inpatient physician specialist for hospitalization.    Diagnosis     Clinical Impression:   1. Acute cystitis without hematuria    2. Metabolic encephalopathy        Attestations:  This note was completed by Juancarlos Garcia DO

## 2021-01-24 NOTE — ACP (ADVANCE CARE PLANNING)
Advance Care Planning Note      NAME: Richa Garcia   :  1945   MRN:  [id number]     Date/Time:  2021 4:20 PM    Active Diagnoses:  Hospital Problems  Date Reviewed: 2021          Codes Class Noted POA    * (Principal) UTI (urinary tract infection) ICD-10-CM: N39.0  ICD-9-CM: 599.0  2021 Unknown        HTN (hypertension) ICD-10-CM: I10  ICD-9-CM: 401.9  2010 Yes              These active diagnoses are of sufficient risk that focused discussion on advance care planning is indicated in order to allow the patient to thoughtfully consider personal goals of care, and if situations arise that prevent the ability to personally give input, to ensure appropriate representation of their personal desires for different levels and aggressiveness of care. Discussion:   Code status addressed and wants to be a DNR / DNI. Patient wants central line and vasopressors if needed. Patient would also want a feeding tube, if needed, for nutritional support. Patient  would like to assign her daughter Jacqueline Lopez   as the surrogate decision maker. Persons present and participating in discussion: Estrella Morelos MD      Time Spent:   Total time spent face-to-face in education and discussion:  16  minutes.          Estrella Anderson MD   Hospitalist

## 2021-01-25 LAB
ANION GAP SERPL CALC-SCNC: 7 MMOL/L (ref 5–15)
BACTERIA SPEC CULT: NORMAL
BASOPHILS # BLD: 0.1 K/UL (ref 0–0.1)
BASOPHILS NFR BLD: 1 % (ref 0–1)
BUN SERPL-MCNC: 19 MG/DL (ref 6–20)
BUN/CREAT SERPL: 19 (ref 12–20)
CALCIUM SERPL-MCNC: 9 MG/DL (ref 8.5–10.1)
CC UR VC: NORMAL
CHLORIDE SERPL-SCNC: 108 MMOL/L (ref 97–108)
CO2 SERPL-SCNC: 25 MMOL/L (ref 21–32)
CREAT SERPL-MCNC: 1.01 MG/DL (ref 0.55–1.02)
DIFFERENTIAL METHOD BLD: ABNORMAL
EOSINOPHIL # BLD: 0.1 K/UL (ref 0–0.4)
EOSINOPHIL NFR BLD: 1 % (ref 0–7)
ERYTHROCYTE [DISTWIDTH] IN BLOOD BY AUTOMATED COUNT: 15.1 % (ref 11.5–14.5)
GLUCOSE SERPL-MCNC: 88 MG/DL (ref 65–100)
HCT VFR BLD AUTO: 42.8 % (ref 35–47)
HGB BLD-MCNC: 13.4 G/DL (ref 11.5–16)
IMM GRANULOCYTES # BLD AUTO: 0.1 K/UL (ref 0–0.04)
IMM GRANULOCYTES NFR BLD AUTO: 1 % (ref 0–0.5)
LYMPHOCYTES # BLD: 1.7 K/UL (ref 0.8–3.5)
LYMPHOCYTES NFR BLD: 17 % (ref 12–49)
MCH RBC QN AUTO: 28.4 PG (ref 26–34)
MCHC RBC AUTO-ENTMCNC: 31.3 G/DL (ref 30–36.5)
MCV RBC AUTO: 90.7 FL (ref 80–99)
MONOCYTES # BLD: 0.9 K/UL (ref 0–1)
MONOCYTES NFR BLD: 9 % (ref 5–13)
NEUTS SEG # BLD: 7.5 K/UL (ref 1.8–8)
NEUTS SEG NFR BLD: 71 % (ref 32–75)
NRBC # BLD: 0 K/UL (ref 0–0.01)
NRBC BLD-RTO: 0 PER 100 WBC
PLATELET # BLD AUTO: 178 K/UL (ref 150–400)
PMV BLD AUTO: 12.4 FL (ref 8.9–12.9)
POTASSIUM SERPL-SCNC: 3.5 MMOL/L (ref 3.5–5.1)
RBC # BLD AUTO: 4.72 M/UL (ref 3.8–5.2)
SERVICE CMNT-IMP: NORMAL
SODIUM SERPL-SCNC: 140 MMOL/L (ref 136–145)
WBC # BLD AUTO: 10.3 K/UL (ref 3.6–11)

## 2021-01-25 PROCEDURE — 85025 COMPLETE CBC W/AUTO DIFF WBC: CPT

## 2021-01-25 PROCEDURE — 74011250637 HC RX REV CODE- 250/637: Performed by: STUDENT IN AN ORGANIZED HEALTH CARE EDUCATION/TRAINING PROGRAM

## 2021-01-25 PROCEDURE — 77030019905 HC CATH URETH INTMIT MDII -A

## 2021-01-25 PROCEDURE — 97530 THERAPEUTIC ACTIVITIES: CPT

## 2021-01-25 PROCEDURE — 36415 COLL VENOUS BLD VENIPUNCTURE: CPT

## 2021-01-25 PROCEDURE — 74011000250 HC RX REV CODE- 250: Performed by: NURSE PRACTITIONER

## 2021-01-25 PROCEDURE — 2709999900 HC NON-CHARGEABLE SUPPLY

## 2021-01-25 PROCEDURE — 80048 BASIC METABOLIC PNL TOTAL CA: CPT

## 2021-01-25 PROCEDURE — 97535 SELF CARE MNGMENT TRAINING: CPT

## 2021-01-25 PROCEDURE — 97165 OT EVAL LOW COMPLEX 30 MIN: CPT

## 2021-01-25 PROCEDURE — 87086 URINE CULTURE/COLONY COUNT: CPT

## 2021-01-25 PROCEDURE — 93005 ELECTROCARDIOGRAM TRACING: CPT

## 2021-01-25 PROCEDURE — 65660000000 HC RM CCU STEPDOWN

## 2021-01-25 PROCEDURE — 97161 PT EVAL LOW COMPLEX 20 MIN: CPT

## 2021-01-25 PROCEDURE — 74011250636 HC RX REV CODE- 250/636: Performed by: STUDENT IN AN ORGANIZED HEALTH CARE EDUCATION/TRAINING PROGRAM

## 2021-01-25 RX ORDER — POLYMYXIN B SULFATE AND TRIMETHOPRIM 1; 10000 MG/ML; [USP'U]/ML
1 SOLUTION OPHTHALMIC 2 TIMES DAILY
Status: DISCONTINUED | OUTPATIENT
Start: 2021-01-25 | End: 2021-01-27 | Stop reason: HOSPADM

## 2021-01-25 RX ADMIN — MEMANTINE HYDROCHLORIDE 5 MG: 5 TABLET ORAL at 17:48

## 2021-01-25 RX ADMIN — ASPIRIN 81 MG: 81 TABLET, CHEWABLE ORAL at 08:30

## 2021-01-25 RX ADMIN — POLYMYXIN B SULFATE, TRIMETHOPRIM SULFATE 1 DROP: 10000; 1 SOLUTION/ DROPS OPHTHALMIC at 12:02

## 2021-01-25 RX ADMIN — ATORVASTATIN CALCIUM 20 MG: 20 TABLET, FILM COATED ORAL at 08:31

## 2021-01-25 RX ADMIN — LATANOPROST 1 DROP: 50 SOLUTION OPHTHALMIC at 21:55

## 2021-01-25 RX ADMIN — AMLODIPINE BESYLATE 5 MG: 5 TABLET ORAL at 08:31

## 2021-01-25 RX ADMIN — LEVOTHYROXINE SODIUM 100 MCG: 0.1 TABLET ORAL at 08:30

## 2021-01-25 RX ADMIN — Medication 10 ML: at 21:54

## 2021-01-25 RX ADMIN — LISINOPRIL 20 MG: 20 TABLET ORAL at 08:30

## 2021-01-25 RX ADMIN — ENOXAPARIN SODIUM 40 MG: 40 INJECTION SUBCUTANEOUS at 08:31

## 2021-01-25 RX ADMIN — POLYMYXIN B SULFATE, TRIMETHOPRIM SULFATE 1 DROP: 10000; 1 SOLUTION/ DROPS OPHTHALMIC at 20:04

## 2021-01-25 RX ADMIN — MEMANTINE HYDROCHLORIDE 5 MG: 5 TABLET ORAL at 08:30

## 2021-01-25 RX ADMIN — Medication 10 ML: at 06:00

## 2021-01-25 RX ADMIN — LEVETIRACETAM 500 MG: 500 TABLET ORAL at 08:30

## 2021-01-25 RX ADMIN — LEVETIRACETAM 500 MG: 500 TABLET ORAL at 17:47

## 2021-01-25 RX ADMIN — Medication 10 ML: at 13:59

## 2021-01-25 RX ADMIN — OXYCODONE HYDROCHLORIDE AND ACETAMINOPHEN 500 MG: 500 TABLET ORAL at 08:30

## 2021-01-25 RX ADMIN — CALCIUM CARBONATE-VITAMIN D TAB 500 MG-200 UNIT 1 TABLET: 500-200 TAB at 08:30

## 2021-01-25 RX ADMIN — DONEPEZIL HYDROCHLORIDE 5 MG: 5 TABLET, FILM COATED ORAL at 21:53

## 2021-01-25 RX ADMIN — CALCIUM CARBONATE-VITAMIN D TAB 500 MG-200 UNIT 1 TABLET: 500-200 TAB at 17:48

## 2021-01-25 NOTE — PROGRESS NOTES
Problem: Mobility Impaired (Adult and Pediatric)  Goal: *Acute Goals and Plan of Care (Insert Text)  Description: FUNCTIONAL STATUS PRIOR TO ADMISSION: Full social hx unclear due to pt being poor historian. Per chart, appears pt lives with her dtr and a friend/significant other? . Pt states she did not use a device for amb. ADL assist unclear. HOME SUPPORT PRIOR TO ADMISSION: The patient lived with family. Physical Therapy Goals  Initiated 1/25/2021  1. Patient will move from supine to sit and sit to supine , scoot up and down, and roll side to side in bed with independence within 7 day(s). 2.  Patient will transfer from bed to chair and chair to bed with supervision/set-up using the least restrictive device within 7 day(s). 3.  Patient will perform sit to stand with supervision/set-up within 7 day(s). 4.  Patient will ambulate with supervision/set-up for 150 feet with the least restrictive device within 7 day(s). Outcome: Not Met   PHYSICAL THERAPY EVALUATION  Patient: Heather Crow (52 y.o. female)  Date: 1/25/2021  Primary Diagnosis: UTI (urinary tract infection) [N39.0]        Precautions:  Fall    ASSESSMENT  Based on the objective data described below, the patient presents with likely near baseline function requiring CGA for mobility to EOB, transfers and very short distance amb without device. Orthostatic vitals taken and found to be positive for 35 point systolic drop from sit to stand. Attempted LE AROM and very short distance amb at EOB and BP did not drop further but did not increase. Pt did not voice any c/o and was talking but further OOB activity not progressed due to drop. BP increased upon return to bed. Nurse informed. Please see flow sheet for details. Expect pt could return home with family, 24 hr assist and possibly HHPT if family feels pt is not fully back to baseline.     Current Level of Function Impacting Discharge (mobility/balance): CGA overall    Functional Outcome Measure: The patient scored 35/100 on the barthel outcome measure which is indicative of 65% functional impairment. Other factors to consider for discharge: will need 24 hr supervision; currently orthostatic with activity     Patient will benefit from skilled therapy intervention to address the above noted impairments. PLAN :  Recommendations and Planned Interventions: bed mobility training, transfer training, gait training, therapeutic exercises, patient and family training/education, and therapeutic activities      Frequency/Duration: Patient will be followed by physical therapy:  3 times a week to address goals. Recommendation for discharge: (in order for the patient to meet his/her long term goals)  Physical therapy at least 2 days/week in the home AND ensure assist and/or supervision for safety with family vs none if family feels pt is at her baseline    This discharge recommendation:  A follow-up discussion with the attending provider and/or case management is planned    IF patient discharges home will need the following DME: patient owns DME required for discharge         SUBJECTIVE:   Patient stated I'm ok.     OBJECTIVE DATA SUMMARY:   HISTORY:    Past Medical History:   Diagnosis Date    CAD (coronary artery disease)     high cholesterol    Dementia (HCC)     Endocrine disease     thyroid    Heart failure (HCC)     MI    Psychiatric disorder     anxiety, depression     Past Surgical History:   Procedure Laterality Date    HX GYN      2 c sections    HX HEENT      tonsilectomy       Personal factors and/or comorbidities impacting plan of care: dementia, hx falls    Home Situation  Home Environment: Private residence  # Steps to Enter: (unclear at this time)  Living Alone: No  Support Systems: Child(uli), Spouse/Significant Other/Partner    EXAMINATION/PRESENTATION/DECISION MAKING:   Critical Behavior:  Neurologic State: Alert, Confused  Orientation Level: Oriented to person, Disoriented to place, Disoriented to situation, Disoriented to time  Cognition: Follows commands, Impaired decision making  Safety/Judgement: Decreased awareness of environment, Decreased awareness of need for assistance, Decreased awareness of need for safety, Decreased insight into deficits  Hearing: Auditory  Auditory Impairment: None    Range Of Motion:  AROM: Within functional limits           PROM: Within functional limits           Strength:    Strength: Within functional limits                    Tone & Sensation:   Tone: Normal              Sensation: Intact               Coordination:  Coordination: Within functional limits  Vision:   Corrective Lenses: Reading glasses  Functional Mobility:  Bed Mobility:     Supine to Sit: Supervision;Bed Modified(head of bed elevated)        Transfers:  Sit to Stand: Contact guard assistance  Stand to Sit: Contact guard assistance        Bed to Chair: Contact guard assistance              Balance:   Sitting: Intact; Without support  Standing: Impaired; With support  Standing - Static: Good  Standing - Dynamic : Fair  Ambulation/Gait Training:  Distance (ft): 5 Feet (ft)  Assistive Device: Gait belt  Ambulation - Level of Assistance: Contact guard assistance        Gait Abnormalities: Decreased step clearance              Speed/Brianda: Slow  Step Length: Right shortened;Left shortened             Functional Measure:  Barthel Index:    Bathin  Bladder: 5  Bowels: 5  Groomin  Dressin  Feedin  Mobility: 0  Stairs: 0  Toilet Use: 0  Transfer (Bed to Chair and Back): 10  Total: 35/100       The Barthel ADL Index: Guidelines  1. The index should be used as a record of what a patient does, not as a record of what a patient could do. 2. The main aim is to establish degree of independence from any help, physical or verbal, however minor and for whatever reason. 3. The need for supervision renders the patient not independent.   4. A patient's performance should be established using the best available evidence. Asking the patient, friends/relatives and nurses are the usual sources, but direct observation and common sense are also important. However direct testing is not needed. 5. Usually the patient's performance over the preceding 24-48 hours is important, but occasionally longer periods will be relevant. 6. Middle categories imply that the patient supplies over 50 per cent of the effort. 7. Use of aids to be independent is allowed. Ankur Hessel., Barthel, DBertW. (3965). Functional evaluation: the Barthel Index. [address] [facility] (14)2. [facility] nando NeelWright Memorial HospitalDIONNA bryant, Willard Galarza., Alma Nicholas., V[place], [address] (1999). Measuring the change indisability after inpatient rehabilitation; comparison of the responsiveness of the Barthel Index and Functional [place] Measure. Journal of Neurology, Neurosurgery, and Psychiatry, 66(4), 307-585. Yahaira Cooley, N.J.A, ARNULFO Norton, & Virginia Nur M.A. (2004.) Assessment of post-stroke quality of life in cost-effectiveness studies: The usefulness of the Barthel Index and the EuroQoL-5D.  Quality of Life Research, 15, 574-79           Physical Therapy Evaluation Charge Determination   History Examination Presentation Decision-Making   HIGH Complexity :3+ comorbidities / personal factors will impact the outcome/ POC  HIGH Complexity : 4+ Standardized tests and measures addressing body structure, function, activity limitation and / or participation in recreation  MEDIUM Complexity : Evolving with changing characteristics  LOW Complexity : FOTO score of       Based on the above components, the patient evaluation is determined to be of the following complexity level: LOW     Pain Rating:  No c/o    Activity Tolerance:   Fair    After treatment patient left in no apparent distress:   Supine in bed, Call bell within reach, Bed / chair alarm activated, and Side rails x 3    COMMUNICATION/EDUCATION:   The patients plan of care was discussed with: Occupational therapist and Registered nurse. Patient is unable to participate in goal setting and plan of care.     Thank you for this referral.  Anne Wilde, PT   Time Calculation: 19 mins

## 2021-01-25 NOTE — PROGRESS NOTES
PHysical Therapy    Pt seen for eval. Basically at a CGA level of function which is likely not far off of her baseline. Could not amb more than a few steps to test due to orthostatic drop in BP. Pt dropping 35 points from supine to stand but pt has not overt c/o. Difficult to assess if there are any sxs due to her dementia. See flow sheet for details. Expect pt could return home with 24 hr family support and possibly HHPT vs none depending upon family's preference if pt is close to baseline. Full report to follow.     Shanika Dorantes, PT

## 2021-01-25 NOTE — PROGRESS NOTES
End of Shift Note    Bedside shift change report given to Olivia Cartagena R.N  (oncoming nurse) by Shefali Posey RN (offgoing nurse). Report included the following information SBAR, Kardex, MAR and Cardiac Rhythm nsr    Shift worked:  7am-7pm   Shift summary and any significant changes:     none       Concerns for physician to address: none   Zone phone for oncoming shift:   [phone]     Patient Information  Magdi Toledo  66 y.o.  1/24/2021 10:25 AM by Lacey Santos MD. Magdi Toledo was admitted from Home    Problem List  Patient Active Problem List    Diagnosis Date Noted    UTI (urinary tract infection) 01/24/2021    HTN (hypertension) 12/21/2010    Unspecified hypothyroidism 12/21/2010    Other and unspecified hyperlipidemia 12/21/2010    Depressive disorder, not elsewhere classified 12/21/2010     Past Medical History:   Diagnosis Date    CAD (coronary artery disease)     high cholesterol    [facility]     Endocrine disease     thyroid    Heart failure ([facility]     MI    Psychiatric disorder     anxiety, depression       Core Measures:  CVA: No No  CHF:No No  PNA:No No    Activity:  Activity Level: Up with Assistance  Number times ambulated in hallways past shift: 0  Number of times OOB to chair past shift: 0    Cardiac:   Cardiac Monitoring: Yes      Cardiac Rhythm: Normal sinus rhythm    Access:   Current line(s): PIV   Central Line? No   PICC LINE? No     Genitourinary:   Urinary status: incontinent  Urinary Catheter? No     Respiratory:   O2 Device: Room air  Chronic home O2 use?: NO  Incentive spirometer at bedside: NO       GI:  Last Bowel Movement Date: 01/23/21([facility] patient)  Current diet:  DIET REGULAR  Passing flatus: yes  Tolerating current diet: YES  % Diet Eaten: 5 %    Pain Management:   Patient states pain is manageable on current regimen: N/A    Skin:  Korey Score: 21  Interventions: increase time out of bed and PT/OT consult    Patient Safety:  Fall Score:  Total Score: 4  Interventions: bed/chair alarm, gripper socks, pt to call before getting OOB and stay with me (per policy)  High Fall Risk: Yes    DVT prophylaxis:  DVT prophylaxis Med- Yes  DVT prophylaxis SCD or AISHWARYA- No     Wounds: (If Applicable)  Wounds- No  Location     Active Consults:  None    Length of Stay:  Expected LOS: - - -  Actual LOS: 1  Discharge Plan: Yes ,possible tomorrow      Eliud Lew RN

## 2021-01-25 NOTE — PROGRESS NOTES
OCCUPATIONAL THERAPY EVALUATION/DISCHARGE  Patient: Yovani Pollard (59 y.o. female)  Date: 1/25/2021  Primary Diagnosis: UTI (urinary tract infection) [N39.0]       Precautions:  Fall    ASSESSMENT  Based on the objective data described below, the patient presents with decreased problem solving/sequencing/safety, decreased higher level mobility/balance, orthostatic hypotension and decreased higher level activity tolerance; however, she is noted to be functioning at high level, benefiting from [address] for mobility/balance challenges without DME, with Supervision for ADL completion with cues for task initiation, sequencing and safety. Pt poor historian; therefore, true PLOF undetermined; however, chart indicates pt resides with daughter and partner and as of November 2020 was not using DME for ADL related mobility/balance. Pt did not verbalize dizziness upon positional changes; however, she non-verbally indicated signs/symptoms of orthostatic hypotension and was noted with drop in BP s/p standing grooming completion (see vitals below). Given pt's current high level of functional independence, physical therapist following pt's decreased mobility/balance and activity tolerance and pt having in-home support from family, no acute OT needs identified. EOB BP: 118/73; s/p standing grooming  Return to Supine BP:  144/66    Current Level of Function (ADLs/self-care): CGA/Supervision    Functional Outcome Measure: The patient scored 35/100 on the Barthel Index outcome measure. Other factors to consider for discharge: orthostatic hypotension     PLAN :  Recommend with staff: Active ADL engagement, CGA to/from [facility]    Recommendation for discharge: (in order for the patient to meet his/her long term goals)  No skilled occupational therapy/ follow up rehabilitation needs identified at this time.     This discharge recommendation:  Has been made in collaboration with the attending provider and/or case management    IF patient discharges home will need the following DME: patient owns DME required for discharge       SUBJECTIVE:   Patient stated I feel fine.     OBJECTIVE DATA SUMMARY:   HISTORY:   Past Medical History:   Diagnosis Date    CAD (coronary artery disease)     high cholesterol    Dementia (HCC)     Endocrine disease     thyroid    Heart failure (HCC)     MI    Psychiatric disorder     anxiety, depression     Past Surgical History:   Procedure Laterality Date    HX GYN      2 c sections    HX HEENT      tonsilectomy       Prior Level of Function/Environment/Context: Lives at daughter and partner; unknown ADL assist at baseline 2/2 pt poor historian. Pt currently CGA for dynamic standing and Supervision for seated aspects  Expanded or extensive additional review of patient history:   Home Situation  Home Environment: Private residence  Living Alone: No  Support Systems: Child(uli), Spouse/Significant Other/Partner    Hand dominance: Right    EXAMINATION OF PERFORMANCE DEFICITS:  Cognitive/Behavioral Status:  Neurologic State: Alert;Confused  Orientation Level: Oriented to person;Disoriented to place; Disoriented to situation;Disoriented to time  Cognition: Follows commands; Impaired decision making  Perception: Appears intact  Perseveration: No perseveration noted  Safety/Judgement: Decreased awareness of environment;Decreased awareness of need for assistance;Decreased awareness of need for safety;Decreased insight into deficits    Hearing: Auditory  Auditory Impairment: None    Vision/Perceptual:    Corrective Lenses: Reading glasses    Range of Motion:  AROM: Within functional limits  PROM: Within functional limits    Strength:  Strength: Within functional limits    Coordination:  Coordination: Within functional limits  Fine Motor Skills-Upper: Left Intact; Right Intact    Gross Motor Skills-Upper: Left Intact; Right Intact    Tone & Sensation:  Tone: Normal  Sensation: Intact    Balance:  Sitting: Intact; Without support  Standing: Impaired; With support  Standing - Static: Constant support;Good  Standing - Dynamic : Constant support;Good;Fair    Functional Mobility and Transfers for ADLs:  Bed Mobility:  Supine to Sit: Supervision;Bed Modified(head of bed elevated)    Transfers:  Sit to Stand: Contact guard assistance  Stand to Sit: Contact guard assistance  Bed to Chair: Contact guard assistance  Bathroom Mobility: Contact guard assistance    ADL Assessment:  Feeding: Setup    Oral Facial Hygiene/Grooming: Contact guard assistance    Bathing: Contact guard assistance    Upper Body Dressing: Setup;Supervision    Lower Body Dressing: Contact guard assistance    Toileting: Contact guard assistance    ADL Intervention and task modifications:  Patient instructed and indicated understanding the benefits of maintaining activity tolerance, functional mobility, and independence with self care tasks during acute stay  to ensure safe return home and to baseline. Encouraged patient to increase frequency and duration OOB, be out of bed for all meals, perform daily ADLs (as approved by RN/MD regarding bathing etc), and performing functional mobility to/from [facility]. Pt educated on safe transfer techniques, with specific emphasis on proper hand placement to push up from seated surface rather than attempt to pull self up, fully positioning self in-front of desired seated location, feeling chair on back of legs and reaching back with 1-2 UE to slowly lower self to seated position. Cognitive Retraining  Safety/Judgement: Decreased awareness of environment;Decreased awareness of need for assistance;Decreased awareness of need for safety;Decreased insight into deficits    Functional Measure:  Barthel Index:    Bathin  Bladder: 5  Bowels: 5  Groomin  Dressin  Feedin  Mobility: 0  Stairs: 0  Toilet Use: 0  Transfer (Bed to Chair and Back): 10  Total: 35/100        The Barthel ADL Index: Guidelines  1.  The index should be used as a record of what a patient does, not as a record of what a patient could do. 2. The main aim is to establish degree of independence from any help, physical or verbal, however minor and for whatever reason. 3. The need for supervision renders the patient not independent. 4. A patient's performance should be established using the best available evidence. Asking the patient, friends/relatives and nurses are the usual sources, but direct observation and common sense are also important. However direct testing is not needed. 5. Usually the patient's performance over the preceding 24-48 hours is important, but occasionally longer periods will be relevant. 6. Middle categories imply that the patient supplies over 50 per cent of the effort. 7. Use of aids to be independent is allowed. Michelle Haddad., Barthel, D.W. (4543). Functional evaluation: the Barthel Index. [address] [facility] (14)2. Florian Mckeon nando DIONNA Adkins, Don Malik., Domonique Oliver., Romi [facility], [address] [place] [address] (1999). Measuring the change indisability after inpatient rehabilitation; comparison of the responsiveness of the Barthel Index and Functional [place] Measure. Journal of Neurology, Neurosurgery, and Psychiatry, 66(4), 478-019. Jonathan Lomeli, N.J.A, GREGORY NortonJ.SKYLAR, & Shantanu Bey, M.A. (2004.) Assessment of post-stroke quality of life in cost-effectiveness studies: The usefulness of the Barthel Index and the EuroQoL-5D.  Quality of Life Research, 15, 841-58     Occupational Therapy Evaluation Charge Determination   History Examination Decision-Making   LOW Complexity : Brief history review  LOW Complexity : 1-3 performance deficits relating to physical, cognitive , or psychosocial skils that result in activity limitations and / or participation restrictions  LOW Complexity : No comorbidities that affect functional and no verbal or physical assistance needed to complete eval tasks       Based on the above components, the patient evaluation is determined to be of the following complexity level: LOW   Pain Rating:  No c/o pain    Activity Tolerance:   Good, Fair, requires rest breaks and signs and symptoms of orthostatic hypotension    After treatment patient left in no apparent distress:    Supine in bed, Call bell within reach, Bed / chair alarm activated and Side rails x 3    COMMUNICATION/EDUCATION:   The patients plan of care was discussed with: Physical therapist, Registered nurse and Case management.      Thank you for this referral.  Martin Moya OT  Time Calculation: 22 mins

## 2021-01-25 NOTE — PROGRESS NOTES
ALONDRA  1) home with family assistance   2) daughter would like to think about having [facility] services arranged  3) daughter to provide transportation at d/c- requested daughter be here around 10:00am for anticipated d/c for tomorrow    Reason for Admission:  UTI                    RUR Score: 9%                    Plan for utilizing home health: per recommendation          PCP: First and Last name: Artem Rojas     Name of Practice: [facility]    Are you a current patient: Yes/No: yes   Approximate date of last visit: 1 month ago had virtual appointment    Can you participate in a virtual visit with your PCP: yes                    Current Advanced Directive/Advance Care Plan: [address] [address] (ACP) Conversation      Date of Conversation: 1/25/21  Conducted with: Healthcare Decision Maker: Next of Kin by law (only applies in absence of a Healthcare Power of  or Legal Guardian)    Healthcare Decision Maker:     Click here to [address] including selection of the Healthcare Decision Maker Relationship (ie \"Primary\")  Today we documented Decision Maker(s). The patient will provide ACP documents. Content/Action Overview: Has ACP document(s) NOT on file - requested patient to provide  Reviewed DNR/DNI and patient confirms current DNR status - completed forms on file (place new order if needed)                  Transition of Care Plan:                      CM contacted patient's daughter, Harshad Galvan, who confirmed demographics, insurance, and emergency contact on file. Pt uses [address] on [facility]. Pt and life  of 30+ years live in a single level home with [address] [facility]. Pt's  is with pt 24/7. Pt's daughter goes to patient's house every morning for half the day to assist with dressing, bathing, eating, medication administration, exercises, etc. Patient requires assistance with all ADLs.  Pt has access to a shower chair and RW. At baseline pt does not use any device to assist with ambulation. Patient does not drive and relies on daughter for transportation. Pt has used HH in the past but unable to recall agency name. Pt has no hx of rehab. CM discussed d/c planning with daughter. Per daughter the plan is for patient to return home with a long term plan of patient transitioning into [facility] setting. CM informed daughter of therapy's recommendation for [place] [facility]. Daughter would like to think about [address] [address]) and will let CM know in the morning. Per dtr she does not know if patient would cooperate with home health PT as patient has gradaully lost interest in her exercises over the last 2 months. Daughter will be providing transportation at d/c and will also be bringing patient clean clothes for transport home in. Care Management Interventions  PCP Verified by CM: Yes(last seen about 1 month ago by virtual appointment )  Mode of Transport at Discharge: Other (see comment)(daughter)  Transition of Care Consult (CM Consult): Discharge Planning  Discharge Durable Medical Equipment: No  Physical Therapy Consult: Yes  Occupational Therapy Consult: Yes  Speech Therapy Consult: No  Current Support Network:  Other, Family Lives Nearby(patient and life partner live in a single level home 2 [address]iri)  Confirm Follow Up Transport: Family  Discharge Location  Discharge Placement: Home with family assistance    Leanne Montano, [address]

## 2021-01-25 NOTE — PROGRESS NOTES
End of Shift Note    Bedside shift change report given to(oncoming nurse) by Rose Flores RN (offgoing nurse). Report included the following information SBAR, Kardex, MAR, Accordion and Recent Results    Shift worked:  Night shift   Shift summary and any significant changes:     No new onset, confused     Concerns for physician to address:  No new onset, confused   Zone phone for oncoming shift:       Patient Information  Ca Seymour  66 y.o.  1/24/2021 10:25 AM by Nieves Chowdary MD. Ca Seymour was admitted from Assisted Living    Problem List  Patient Active Problem List    Diagnosis Date Noted    UTI (urinary tract infection) 01/24/2021    HTN (hypertension) 12/21/2010    Unspecified hypothyroidism 12/21/2010    Other and unspecified hyperlipidemia 12/21/2010    Depressive disorder, not elsewhere classified 12/21/2010     Past Medical History:   Diagnosis Date    CAD (coronary artery disease)     high cholesterol    [facility] [address]     Endocrine disease     thyroid    Heart failure ([facility] [address]     MI    Psychiatric disorder     anxiety, depression       Core Measures:  CVA: No Yes  CHF:No Yes  PNA:No No    Activity:  Activity Level: Up ad phani  Number times ambulated in hallways past shift: 0  Number of times OOB to chair past shift: 2    Cardiac:   Cardiac Monitoring: Yes      Cardiac Rhythm: Normal sinus rhythm    Access:   Current line(s): PIV   Central Line? No Placement date  Reason Medically Necessary   PICC LINE? No Placement date Reason Medically Necessary     Genitourinary:   Urinary status: voiding  Urinary Catheter?  No Placement Date  Reason Medically Necessary     Respiratory:   O2 Device: Room air  Chronic home O2 use?: NO  Incentive spirometer at bedside: NO       GI:  Last Bowel Movement Date: 01/23/21(Perr patient)  Current diet:  DIET REGULAR  Passing flatus: YES  Tolerating current diet: YES       Pain Management:   Patient states pain is manageable on current regimen: YES    Skin:  Korey Score: 21  Interventions: float heels, limit briefs and nutritional support     Patient Safety:  Fall Score: Total Score: 2  Interventions: bed/chair alarm       DVT prophylaxis:  DVT prophylaxis Med- No  DVT prophylaxis SCD or AISHWARYA- No     Wounds: (If Applicable)  Wounds- No  Location     Active Consults:  None    Length of Stay:  Expected LOS: - - -  Actual LOS: 1  Discharge Plan: Yes   Pt is alert and confused, non compliant.  She denies chest pain and SOB, no distress noted      Rachid Mercado RN

## 2021-01-25 NOTE — PROGRESS NOTES
Hospitalist Progress Note    NAME: Mann Martinez   :  1945   MRN:  [id number]           Assessment / Plan:  Acute Metabolic Encephalopathy with Underlying Dementia   Urinary Tract Infection   · Patient more confused than baseline with fall at home. Daughter reports poor PO intake for several days PTA. · CT of Head  - No evidence for acute intracranial trauma. Severe white matter disease with multiple stable lacunar infarcts. Vertebral basilar calcification, can result in an insufficiency syndrome. · Urinalysis with bacteriuria and pyuria, + leukocyte esterase   · Urine culture taken in ED with contaminate - resent second culture, repeat urine culture pending  · Continue Levaquin (allergy to cephalosporins)  - Day 3    · Daughter stated that her mother's dementia has progressed over the past few months and she is in the process of transitioning patient to a private home with live in care. Conjunctivitis to both eyes  · Both eyes red with thick drainage  · Start Polytrim drops     Urinary Retention likely multifactorial  -Urology consulted due to continued urinary retention. Joseph placed today due to urinary retention. She will need outpatient follow-up with urology. -Adjust antibiotics based on culture results.  -Consider starting on medications for urinary retention after treatment of UTI. Orthostatic Hypotension  Fall PTA  Recheck orthostatic vitals. Start below-knee compressions. Hypertension  Hyperlipidemia   · Continue amlodipine, lisinopril, ASA and atorvastatin   · Monitor     Chronic Kidney Disease Stage III   · Cr at baseline   · Avoid Nephrotoxic Medications    History of Seizure continue Keppra   History of Stroke continue ASA and atorvastatin   Hypothyroidism continue synthroid     Disposition:  Discharge in a.m. once urine culture results are final and if no longer orthostatic.    25.0 - 29.9 Overweight / Body mass index is 28.2 kg/m².     Code status: DNR  Prophylaxis: Lovenox  Recommended Disposition: Home w/Family     Subjective:     Chief Complaint / Reason for Physician Visit  She continues to have difficulty with urination  She is alert, awake and confused  Daughter at bedside  Per daughter, confusion getting worse since the last 3 months            Objective:     VITALS:   Last 24hrs VS reviewed since prior progress note. Most recent are:  Patient Vitals for the past 24 hrs:   Temp Pulse Resp BP SpO2   01/25/21 1144 98.6 °F (37 °C) 78 18 (!) 142/69 93 %   01/25/21 1032  97  (!) 144/69    01/25/21 1027  (!) 114  96/69    01/25/21 1026  (!) 117  97/61    01/25/21 1024  (!) 111  103/64    01/25/21 1022  100  132/61    01/25/21 0744 97.8 °F (36.6 °C) 99 18 (!) 140/83 95 %   01/25/21 0516 98.4 °F (36.9 °C) 96 18 (!) 158/71 95 %   01/25/21 0002 98.2 °F (36.8 °C) 84  139/67 95 %   01/24/21 2048 97.7 °F (36.5 °C) 81  111/68 95 %   01/24/21 2000  85      01/24/21 1957 97.9 °F (36.6 °C) 95 20 120/65 93 %   01/24/21 1511 96.8 °F (36 °C) 98 20 112/73 98 %   01/24/21 1400  88 21 (!) 148/59 100 %       Intake/Output Summary (Last 24 hours) at 1/25/2021 1336  Last data filed at 1/25/2021 0836  Gross per 24 hour   Intake 360 ml   Output    Net 360 ml        I had a face to face encounter and independently examined this patient on 1/25/2021, as outlined below:    PHYSICAL EXAM:  General: Alert, awake, comfortable  EENT:  EOMI. Anicteric sclerae. MMM  Resp:  CTA bilaterally, no wheezing or rales. No accessory muscle use  CV:  Regular  rhythm,  No edema  GI:  Soft, obese, Non tender. +Bowel sounds  Neurologic:  Alert and awake, confused  Psych:   Poor insight. Not anxious nor agitated  Skin:  No rashes.   No jaundice    Reviewed most current lab test results and cultures  YES  Reviewed most current radiology test results   YES  Review and summation of old records today    NO  Reviewed patient's current orders and MAR    YES  PMH/SH reviewed - no change compared to H&P  ________________________________________________________________________  Care Plan discussed with:    Comments   Patient x    Family  x Daughter    RN x    Care Manager x    Consultant                        Multidiciplinary team rounds were held today with , nursing, pharmacist and clinical coordinator. Patient's plan of care was discussed; medications were reviewed and discharge planning was addressed. ________________________________________________________________________  Total NON critical care TIME:  25   Minutes    Total CRITICAL CARE TIME Spent:   Minutes non procedure based      Comments   >50% of visit spent in counseling and coordination of care x    ________________________________________________________________________  Ariane Bauman NP     Procedures: see electronic medical records for all procedures/Xrays and details which were not copied into this note but were reviewed prior to creation of Plan. LABS:  I reviewed today's most current labs and imaging studies.   Pertinent labs include:  Recent Labs     01/25/21  0208 01/24/21  1150   WBC 10.3 12.8*   HGB 13.4 15.9   HCT 42.8 49.5*    211     Recent Labs     01/25/21  0208 01/24/21  1150    141   K 3.5 3.5    106   CO2 25 31   GLU 88 100   BUN 19 15   CREA 1.01 1.24*   CA 9.0 9.3   ALB  --  3.2*   TBILI  --  0.4   ALT  --  20       Signed: Ariane Bauman NP

## 2021-01-25 NOTE — PROGRESS NOTES
Bedside and Verbal shift change report given to Crescencio Jacobo RN (oncoming nurse) by Mark Kirby RN (offgoing nurse). Report included the following information SBAR, Kardex and Quality Measures.

## 2021-01-26 LAB
BACTERIA SPEC CULT: NORMAL
SERVICE CMNT-IMP: NORMAL

## 2021-01-26 PROCEDURE — 74011250636 HC RX REV CODE- 250/636: Performed by: STUDENT IN AN ORGANIZED HEALTH CARE EDUCATION/TRAINING PROGRAM

## 2021-01-26 PROCEDURE — 65660000000 HC RM CCU STEPDOWN

## 2021-01-26 PROCEDURE — 51798 US URINE CAPACITY MEASURE: CPT

## 2021-01-26 PROCEDURE — 74011250637 HC RX REV CODE- 250/637: Performed by: STUDENT IN AN ORGANIZED HEALTH CARE EDUCATION/TRAINING PROGRAM

## 2021-01-26 PROCEDURE — 77030005513 HC CATH URETH FOL11 MDII -B

## 2021-01-26 PROCEDURE — 77030019905 HC CATH URETH INTMIT MDII -A

## 2021-01-26 PROCEDURE — 97116 GAIT TRAINING THERAPY: CPT | Performed by: PHYSICAL THERAPIST

## 2021-01-26 PROCEDURE — 74011250636 HC RX REV CODE- 250/636: Performed by: INTERNAL MEDICINE

## 2021-01-26 RX ORDER — LORAZEPAM 2 MG/ML
1 INJECTION INTRAMUSCULAR ONCE
Status: COMPLETED | OUTPATIENT
Start: 2021-01-26 | End: 2021-01-26

## 2021-01-26 RX ORDER — LEVETIRACETAM 10 MG/ML
1000 INJECTION INTRAVASCULAR EVERY 12 HOURS
Status: DISCONTINUED | OUTPATIENT
Start: 2021-01-26 | End: 2021-01-26

## 2021-01-26 RX ORDER — LEVETIRACETAM 10 MG/ML
1000 INJECTION INTRAVASCULAR ONCE
Status: DISCONTINUED | OUTPATIENT
Start: 2021-01-26 | End: 2021-01-26

## 2021-01-26 RX ADMIN — OXYCODONE HYDROCHLORIDE AND ACETAMINOPHEN 500 MG: 500 TABLET ORAL at 08:39

## 2021-01-26 RX ADMIN — POLYMYXIN B SULFATE, TRIMETHOPRIM SULFATE 1 DROP: 10000; 1 SOLUTION/ DROPS OPHTHALMIC at 08:40

## 2021-01-26 RX ADMIN — LEVOFLOXACIN 750 MG: 5 INJECTION, SOLUTION INTRAVENOUS at 13:50

## 2021-01-26 RX ADMIN — Medication 10 ML: at 22:01

## 2021-01-26 RX ADMIN — AMLODIPINE BESYLATE 5 MG: 5 TABLET ORAL at 08:39

## 2021-01-26 RX ADMIN — LISINOPRIL 20 MG: 20 TABLET ORAL at 08:38

## 2021-01-26 RX ADMIN — ASPIRIN 81 MG: 81 TABLET, CHEWABLE ORAL at 08:39

## 2021-01-26 RX ADMIN — ATORVASTATIN CALCIUM 20 MG: 20 TABLET, FILM COATED ORAL at 08:39

## 2021-01-26 RX ADMIN — CALCIUM CARBONATE-VITAMIN D TAB 500 MG-200 UNIT 1 TABLET: 500-200 TAB at 08:39

## 2021-01-26 RX ADMIN — Medication 10 ML: at 06:02

## 2021-01-26 RX ADMIN — Medication 10 ML: at 13:52

## 2021-01-26 RX ADMIN — POLYMYXIN B SULFATE, TRIMETHOPRIM SULFATE 1 DROP: 10000; 1 SOLUTION/ DROPS OPHTHALMIC at 17:08

## 2021-01-26 RX ADMIN — LATANOPROST 1 DROP: 50 SOLUTION OPHTHALMIC at 22:00

## 2021-01-26 RX ADMIN — LEVETIRACETAM 500 MG: 500 TABLET ORAL at 08:38

## 2021-01-26 RX ADMIN — ENOXAPARIN SODIUM 40 MG: 40 INJECTION SUBCUTANEOUS at 08:39

## 2021-01-26 RX ADMIN — DONEPEZIL HYDROCHLORIDE 5 MG: 5 TABLET, FILM COATED ORAL at 22:00

## 2021-01-26 RX ADMIN — LORAZEPAM 1 MG: 2 INJECTION INTRAMUSCULAR; INTRAVENOUS at 17:30

## 2021-01-26 RX ADMIN — LEVOTHYROXINE SODIUM 100 MCG: 0.1 TABLET ORAL at 08:39

## 2021-01-26 RX ADMIN — MEMANTINE HYDROCHLORIDE 5 MG: 5 TABLET ORAL at 08:39

## 2021-01-26 RX ADMIN — LEVETIRACETAM 500 MG: 500 TABLET ORAL at 17:07

## 2021-01-26 NOTE — PROGRESS NOTES
Pt lethargic, able to follow commands and follow my fingers with her eyes, but drifting off to sleep and pt demonstrating frequent jerking movements.   Md notified     Md ordered IV ativan 1mg

## 2021-01-26 NOTE — PROGRESS NOTES
Problem: Mobility Impaired (Adult and Pediatric)  Goal: *Acute Goals and Plan of Care (Insert Text)  Description: FUNCTIONAL STATUS PRIOR TO ADMISSION: Full social hx unclear due to pt being poor historian. Per chart, appears pt lives with her dtr and a friend/significant other? . Pt states she did not use a device for amb. ADL assist unclear. HOME SUPPORT PRIOR TO ADMISSION: The patient lived with family. Physical Therapy Goals  Initiated 1/25/2021  1. Patient will move from supine to sit and sit to supine , scoot up and down, and roll side to side in bed with independence within 7 day(s). 2.  Patient will transfer from bed to chair and chair to bed with supervision/set-up using the least restrictive device within 7 day(s). 3.  Patient will perform sit to stand with supervision/set-up within 7 day(s). 4.  Patient will ambulate with supervision/set-up for 150 feet with the least restrictive device within 7 day(s). Outcome: Progressing Towards Goal   PHYSICAL THERAPY TREATMENT  Patient: Mauricio Lux (73 y.o. female)  Date: 1/26/2021  Diagnosis: UTI (urinary tract infection) [N39.0] UTI (urinary tract infection)       Precautions: Fall  Chart, physical therapy assessment, plan of care and goals were reviewed. ASSESSMENT  Patient continues with skilled PT services and is progressing towards goals. Patient continues to be very confused and needs frequent reorientation during session. Overall SBA to come to EOB and has good sitting balance. Sit to stand with CGA and able to take a few steps to chair with Parth. Patient balance is overall fair at best and confusion increases her risk for falls. Session limited as RN present and needing patient to return to supine in order for straight cath. Patient will likely need SNF given confusion.        Other factors to consider for discharge: at risk for falls, confused, below functional baseline         PLAN :  Patient continues to benefit from skilled intervention to address the above impairments. Continue treatment per established plan of care. to address goals. Recommendation for discharge: (in order for the patient to meet his/her long term goals)  Therapy up to 5 days/week in SNF setting vs  PT with 24/7 supervision/physical assistace        IF patient discharges home will need the following DME: to be determined (TBD)       SUBJECTIVE:   Patient stated I feel like I have been here before but I don't know.     OBJECTIVE DATA SUMMARY:   Critical Behavior:  Neurologic State: Alert, Confused  Orientation Level: Oriented to person, Disoriented to time, Disoriented to situation, Disoriented to place  Cognition: Decreased attention/concentration, Decreased command following  Safety/Judgement: Decreased awareness of environment, Decreased awareness of need for assistance, Decreased awareness of need for safety, Decreased insight into deficits  Functional Mobility Training:  Bed Mobility:     Supine to Sit: Supervision; Additional time;Assist x1  Sit to Supine: Supervision  Scooting: Supervision        Transfers:  Sit to Stand: Contact guard assistance  Stand to Sit: Contact guard assistance        Bed to Chair: Minimum assistance;Assist x1                    Balance:  Sitting: Intact; Without support  Standing: Impaired  Standing - Static: Constant support; Fair  Standing - Dynamic : Fair  Ambulation/Gait Training:  Distance (ft): 5 Feet (ft)(x 2)  Assistive Device: Gait belt  Ambulation - Level of Assistance: Minimal assistance;Assist x1        Gait Abnormalities: Decreased step clearance;Shuffling gait              Speed/Brianda: Pace decreased (<100 feet/min); Shuffled; Slow  Step Length: Left shortened;Right shortened       Pain Rating:  No c/o pain    Activity Tolerance:   Fair and requires rest breaks    After treatment patient left in no apparent distress:   Supine in bed, Call bell within reach, and Side rails x 3-patient left supine in bed with RN present for straight cath    COMMUNICATION/COLLABORATION:   The patients plan of care was discussed with: Physical therapist, Occupational therapist, and Registered nurse.      Lamont Ballard PT, DPT   Time Calculation: 14 mins

## 2021-01-26 NOTE — PROGRESS NOTES
2 attempts made to straight cath    Both unsuccessful        Pt clinches and states she is in pain  Area is swollen and difficult to visualize target entry point

## 2021-01-26 NOTE — PROGRESS NOTES
Urology at bedside when I attempted to straight cath patient, received verbal order to place ordonez, ordonez placed by urology, ordonez draining well and pt resting in bed.

## 2021-01-26 NOTE — PROGRESS NOTES
End of Shift Note     Bedside shift change report given to [employer] R.N  (oncoming nurse) by Sonia Meredith RN (offgoing nurse). Report included the following information SBAR, Kardex, MAR and Cardiac Rhythm nsr     Shift worked:  days   Shift summary and any significant changes:      urinary retention ordonez placed, urology consult, neuro consult placed due to movement of head in a jerking motion, pt lethargic and pt having jerking movements MD notified 1mg ativan given         Concerns for physician to address: none   Zone phone for oncoming shift:   [phone]      Patient Information  Mann Martinez  66 y.o.  1/24/2021 10:25 AM by Balbina Gautam MD. Mann Martinez was admitted from Home     Problem List       Patient Active Problem List     Diagnosis Date Noted    UTI (urinary tract infection) 01/24/2021    HTN (hypertension) 12/21/2010    Unspecified hypothyroidism 12/21/2010    Other and unspecified hyperlipidemia 12/21/2010    Depressive disorder, not elsewhere classified 12/21/2010           Past Medical History:   Diagnosis Date    CAD (coronary artery disease)       high cholesterol    Dementia ([facility] [address]      Endocrine disease       thyroid    Heart failure ([facility] [address]       MI    Psychiatric disorder       anxiety, depression         Core Measures:  CVA: No No  CHF:No No  PNA:No No     Activity:  Activity Level: Up with Assistance  Number times ambulated in hallways past shift: 0  Number of times OOB to chair past shift: 0     Cardiac:   Cardiac Monitoring: Yes      Cardiac Rhythm: Normal sinus rhythm     Access:   Current line(s): PIV   Central Line? No   PICC LINE? No      Genitourinary:   Urinary status: incontinent  Urinary Catheter?  No      Respiratory:   O2 Device: Room air  Chronic home O2 use?: NO  Incentive spirometer at bedside: NO     GI:  Last Bowel Movement Date: 01/23/21(Perr patient)  Current diet:  DIET REGULAR  Passing flatus: yes  Tolerating current diet: YES  % Diet Eaten: 5 %     Pain Management:   Patient states pain is manageable on current regimen: N/A     Skin:  Korey Score: 21  Interventions: increase time out of bed and PT/OT consult    Patient Safety:  Fall Score: Total Score: 4  Interventions: bed/chair alarm, gripper socks, pt to call before getting OOB and stay with me (per policy)  High Fall Risk:  Yes     DVT prophylaxis:  DVT prophylaxis Med- Yes  DVT prophylaxis SCD or AISHWARYA- No      Wounds: (If Applicable)  Wounds- No  Location      Active Consults:  None     Length of Stay:  Expected LOS: - - -  Actual LOS: 1  Discharge Plan: Yes ,possible tomorrow        Gertrude Ricardo RN

## 2021-01-26 NOTE — PROGRESS NOTES
Problem: Falls - Risk of  Goal: *Absence of Falls  Description: Document Nafisa Bangura Fall Risk and appropriate interventions in the flowsheet.   Note: Fall Risk Interventions:  Mobility Interventions: Bed/chair exit alarm, Patient to call before getting OOB    Mentation Interventions: Adequate sleep, hydration, pain control, Bed/chair exit alarm, More frequent rounding    Medication Interventions: Bed/chair exit alarm, Patient to call before getting OOB    Elimination Interventions: Bed/chair exit alarm, Call light in reach, Toileting schedule/hourly rounds    History of Falls Interventions: Bed/chair exit alarm

## 2021-01-26 NOTE — PROGRESS NOTES
Patient resting in bed sleeping when I assessed pt moving head in a jerking like movement, MD notified and neurology consult placed.

## 2021-01-26 NOTE — PROGRESS NOTES
End of Shift Note     Bedside shift change report given to [place]ev[place]  (oncoming nurse) by Maurizio Matt RN (offgoing nurse). Report included the following information SBAR, Kardex, MAR and Cardiac Rhythm nsr     Shift worked:  night   Shift summary and any significant changes:      none         Concerns for physician to address: none   Zone phone for oncoming shift:   [phone]      Patient Information  Mauricio Lux  66 y.o.  1/24/2021 10:25 AM by Katy Ford MD. Mauricio Lux was admitted from Home     Problem List       Patient Active Problem List     Diagnosis Date Noted    UTI (urinary tract infection) 01/24/2021    HTN (hypertension) 12/21/2010    Unspecified hypothyroidism 12/21/2010    Other and unspecified hyperlipidemia 12/21/2010    Depressive disorder, not elsewhere classified 12/21/2010           Past Medical History:   Diagnosis Date    CAD (coronary artery disease)       high cholesterol    Dementia ([facility] [address]      Endocrine disease       thyroid    Heart failure ([facility] [address]       MI    Psychiatric disorder       anxiety, depression         Core Measures:  CVA: No No  CHF:No No  PNA:No No     Activity:  Activity Level: Up with Assistance  Number times ambulated in hallways past shift: 0  Number of times OOB to chair past shift: 0     Cardiac:   Cardiac Monitoring: Yes      Cardiac Rhythm: Normal sinus rhythm     Access:   Current line(s): PIV   Central Line? No   PICC LINE? No      Genitourinary:   Urinary status: incontinent  Urinary Catheter?  No      Respiratory:   O2 Device: Room air  Chronic home O2 use?: NO  Incentive spirometer at bedside: NO     GI:  Last Bowel Movement Date: 01/23/21(Perr patient)  Current diet:  DIET REGULAR  Passing flatus: yes  Tolerating current diet: YES  % Diet Eaten: 5 %     Pain Management:   Patient states pain is manageable on current regimen: N/A     Skin:  Korey Score: 21  Interventions: increase time out of bed and PT/OT consult    Patient Safety:  Fall Score: Total Score: 4  Interventions: bed/chair alarm, gripper socks, pt to call before getting OOB and stay with me (per policy)  High Fall Risk:  Yes     DVT prophylaxis:  DVT prophylaxis Med- Yes  DVT prophylaxis SCD or AISHWARYA- No      Wounds: (If Applicable)  Wounds- No  Location      Active Consults:  None     Length of Stay:  Expected LOS: - - -  Actual LOS: 1  Discharge Plan: Yes ,possible tomorrow        Reinier marlow RN

## 2021-01-26 NOTE — CONSULTS
New Urology Consult Note    Patient: Sabine Hector MRN: [id number]  SSN: [id number]    YOB: 1945  Age: 66 y.o. Sex: female            Assessment:     Sabine Hector is a 66 y.o. female with a PMH of dementia and CVA who presents to the ED on 1/24/21 with a GLF. Over the last few days she was noted to have poor oral intake and nausea. She is being treated for her GLF and UTI. Since admission she has been in urinary retention requiring intermittent catheterization. Recommendations:     1. Ordonez placed for urinary retention  2. Keep ordonez 7-10 days for bladder rest  3. Will need outpatient f/up for voiding trial  4. Continue culture directed abx    Follow up scheduled for 2/3/2021 and will add to DC instructions. Will sign off. Please call if needed. Thank you for this consult. Please contact [facility] with any further questions/concerns. Karli Almendarez NP [phone]    History of Present Illness:     Reason for Consult:  Urinary Retention    Sabine Hector is seen in consultation for reasons noted above at the request of Binh Covarrubias MD.    This is a 66 y.o. female with a history of CVA and dementia and is a poor historian. She denies urinary history or current urinary symptoms. Bladder scan showing 569 cc of urine in bladder. Per nursing staff she has been difficult to catheterize. Her last straight cath was 1/25/21 and had 1150 cc of urine. There has been no abdominal imaging at this time. Labs reviewed. VSS. Creat 1.01 and HGB 13.4. WBC 10.3. UA demonstrating infection.      Subjective     Past Medical History  Past Medical History:   Diagnosis Date    CAD (coronary artery disease)     high cholesterol    Dementia (HCC)     Endocrine disease     thyroid    Heart failure (HCC)     MI    Psychiatric disorder     anxiety, depression       Past Surgical History:   Past Surgical History:   Procedure Laterality Date    HX GYN      2 c sections    HX HEENT      tonsilectomy       Medication:  Current Facility-Administered Medications   Medication Dose Route Frequency Provider Last Rate Last Admin    trimethoprim-polymyxin b (POLYTRIM) 10,000 unit- 1 mg/mL ophthalmic solution 1 Drop  1 Drop Both Eyes BID Kezia Luz SARMIENTO NP   1 Drop at 01/26/21 0840    amLODIPine (NORVASC) tablet 5 mg  5 mg Oral DAILY Linda Pleitez MD   5 mg at 01/26/21 0769    aspirin chewable tablet 81 mg  81 mg Oral DAILY Linda Pleitez MD   81 mg at 01/26/21 0839    calcium-vitamin D (OS-MARYLOU +D3) 500 mg-200 unit per tablet 1 Tab  1 Tab Oral BID WITH MEALS Linda Pleitez MD   1 Tab at 01/26/21 0839    donepeziL (ARICEPT) tablet 5 mg  5 mg Oral QHS Yenny Landrum MD   5 mg at 01/25/21 2153    latanoprost (XALATAN) 0.005 % ophthalmic solution 1 Drop  1 Drop Both Eyes QHS Linda Pleitez MD   1 Drop at 01/25/21 2155    levETIRAcetam (KEPPRA) tablet 500 mg  500 mg Oral BID Linda Pleitez MD   500 mg at 01/26/21 0838    levothyroxine (SYNTHROID) tablet 100 mcg  100 mcg Oral ACB Linda Pleitez MD   100 mcg at 01/26/21 0839    lisinopriL (PRINIVIL, ZESTRIL) tablet 20 mg  20 mg Oral DAILY Linda Pleitez MD   20 mg at 01/26/21 0838    memantine (NAMENDA) tablet 5 mg  5 mg Oral BID Linda Pleitez MD   5 mg at 01/26/21 0839    atorvastatin (LIPITOR) tablet 20 mg  20 mg Oral DAILY Linda Pleitez MD   20 mg at 01/26/21 0839    sodium chloride (NS) flush 5-40 mL  5-40 mL IntraVENous Q8H Yenny Landrum MD   10 mL at 01/26/21 0602    sodium chloride (NS) flush 5-40 mL  5-40 mL IntraVENous PRN Linda Pleitez MD        acetaminophen (TYLENOL) tablet 650 mg  650 mg Oral Q6H PRN Linda Pleitez MD        Or    acetaminophen (TYLENOL) suppository 650 mg  650 mg Rectal Q6H PRN Faid Landrum MD        polyethylene glycol (MIRALAX) packet 17 g  17 g Oral DAILY PRN Leonardo Mcclure MD        promethazine (PHENERGAN) tablet 12.5 mg  12.5 mg Oral Q6H PRN Leonardo Mcclure MD        Or    ondansetron (ZOFRAN) injection 4 mg  4 mg IntraVENous Q6H PRN Claudia Landrum MD        enoxaparin (LOVENOX) injection 40 mg  40 mg SubCUTAneous DAILY Yenny Landrum MD   40 mg at 01/26/21 0839    levoFLOXacin (LEVAQUIN) 750 mg in D5W IVPB  750 mg IntraVENous Q48H Claudia Landrum MD        ascorbic acid (vitamin C) (VITAMIN C) tablet 500 mg  500 mg Oral DAILY Leonardo Mcclure MD   500 mg at 01/26/21 5451       Allergies:   Allergies   Allergen Reactions    Augmentin [Amoxicillin-Pot Clavulanate] Nausea Only     Abdominal pain    Belladonna Alkaloids Swelling     Swelling of tongue    Carbocaine [Mepivacaine] Other (comments)     Chest pain    Cortisone Other (comments)     depression    Decadron [Dexamethasone] Other (comments)     Altered mental status    Erythromycin Nausea Only    Meprobamate Swelling     tongue    Paxil [Paroxetine Hcl] Other (comments)     Headache, syncope    Vantin [Cefpodoxime] Unknown (comments)    Vibramycin Calcium Nausea Only    Zoloft [Sertraline] Other (comments)     Headache, syncope       Social History:  Social History     Tobacco Use    Smoking status: Current Every Day Smoker     Packs/day: 0.50     Years: 10.00     Pack years: 5.00    Smokeless tobacco: Former User   Substance Use Topics    Alcohol use: No    Drug use: No       Family History  Family History   Problem Relation Age of Onset    Hypertension Mother     Diabetes Mother     Emphysema Father     Asthma Daughter        Review of Systems  Unable to obtain    Objective:     Vital signs in last 24 hours:  Visit Vitals  /81   Pulse 84   Temp 98 °F (36.7 °C)   Resp 18   Ht 5' 3\" (1.6 m)   Wt 72.2 kg (159 lb 2.8 oz)   SpO2 95%   BMI 28.20 kg/m²       Intake/Output last 3 shifts:  Date 01/25/21 0700 - 01/26/21 0659 01/26/21 0700 - 01/27/21 0659   Shift 0700-1859 1900-0659 24 Hour Total 9915-2827 1539-8713 24 Hour Total   INTAKE   P.O. 840  840 60  60     P.O. 840  840 60  60   Shift Total(mL/kg) 840(11.6)  840(11.6) 60(0.8)  60(0.8)   OUTPUT   Urine(mL/kg/hr) 1150(1.3)  1150(0.7)        Urine 1150  1150      Shift Total(mL/kg) 1150(15.9)  1150(15.9)      NET -310  -310 60  60   Weight (kg) 72.2 72.2 72.2 72.2 72.2 72.2         Physical Exam  General: NAD, demented, tearful   Head: Atraumatic  ENT: normal ears  Neck: no tracheal deviation  Cardiac: not tachycardic   Pulmonary: Normal work of breathing   Abdomen: soft, NTTP, nondistended  : normal external exam, indwelling ordonez, clear yellow urine  Extremities: moves all  Gait: not observed  Mood/Affect: normal    Lab/Imaging Review:       Most Recent Labs:  Lab Results   Component Value Date/Time    WBC 10.3 01/25/2021 02:08 AM    HGB 13.4 01/25/2021 02:08 AM    HCT 42.8 01/25/2021 02:08 AM    PLATELET 178 01/25/2021 02:08 AM    MCV 90.7 01/25/2021 02:08 AM        Lab Results   Component Value Date/Time    Sodium 140 01/25/2021 02:08 AM    Potassium 3.5 01/25/2021 02:08 AM    Chloride 108 01/25/2021 02:08 AM    CO2 25 01/25/2021 02:08 AM    Anion gap 7 01/25/2021 02:08 AM    Glucose 88 01/25/2021 02:08 AM    BUN 19 01/25/2021 02:08 AM    Creatinine 1.01 01/25/2021 02:08 AM    BUN/Creatinine ratio 19 01/25/2021 02:08 AM    GFR est AA >60 01/25/2021 02:08 AM    GFR est non-AA 53 (L) 01/25/2021 02:08 AM    Calcium 9.0 01/25/2021 02:08 AM    Bilirubin, total 0.4 01/24/2021 11:50 AM    Alk. phosphatase 109 01/24/2021 11:50 AM    Protein, total 7.0 01/24/2021 11:50 AM    Albumin 3.2 (L) 01/24/2021 11:50 AM    Globulin 3.8 01/24/2021 11:50 AM    A-G Ratio 0.8 (L) 01/24/2021 11:50 AM    ALT (SGPT) 20 01/24/2021 11:50 AM        No results found for: PSA, PSA2, PSAR1, PSA1, PSAR2, PSA3, PSAR3, [id number], [id number], 39105, PSAEXT     COAGS:  No results found for: APTT, PTP, INR, INREXT    Lab  Results   Component Value Date/Time    Hemoglobin A1c 6.3 (H) 11/25/2020 01:13 AM        Lab Results   Component Value Date/Time    CK 76 12/08/2010 02:40 PM    CK - MB 0.7 12/08/2010 02:40 PM    CK-MB Index 0.9 12/08/2010 02:40 PM    Troponin-I, Qt. <0.05 01/24/2021 11:50 AM          Urine/Blood Cultures:  Results     Procedure Component Value Units Date/Time    CULTURE, URINE [[id number]] Collected: 01/25/21 1629    Order Status: Completed Specimen: Urine from Clean catch Updated: 01/25/21 2357    COVID-19 RAPID TEST [[id number]] Collected: 01/24/21 1254    Order Status: Completed Specimen: Nasopharyngeal Updated: 01/24/21 1345     Specimen source Nasopharyngeal        COVID-19 rapid test Not detected        Comment: Rapid Abbott ID Now       Rapid NAAT:  The specimen is NEGATIVE for SARS-CoV-2, the novel coronavirus associated with COVID-19. Negative results should be treated as presumptive and, if inconsistent with clinical signs and symptoms or necessary for patient management, should be tested with an alternative molecular assay. Negative results do not preclude SARS-CoV-2 infection and should not be used as the sole basis for patient management decisions. This test has been authorized by the FDA under an Emergency Use Authorization (EUA) for use by authorized laboratories. Fact sheet for Healthcare Providers: kstattoo.com  Fact sheet for Patients: kstattoo.com       Methodology: Isothermal Nucleic Acid Amplification         CULTURE, URINE [[id number]] Collected: 01/24/21 1054    Order Status: Completed Specimen: Urine Updated: 01/25/21 1101     Special Requests: --        NO SPECIAL REQUESTS  Reflexed from [id number]       [place] Count --        >100,000  COLONIES/mL       Culture result:       >2 ORGANISMS - CONTAMINATED SPECIMEN. SUGGEST RECOLLECTION                   IMAGING:  No results found.         Signed By: Nohelia Washington NP  - January 26, 2021

## 2021-01-26 NOTE — PROGRESS NOTES
Bladder scanned and pt had 520ml of urine in bladder  @10:00 straight cathed pt unsuccessful, pt perineal area swollen and tender. Will re-attempt later

## 2021-01-26 NOTE — PROGRESS NOTES
Hospitalist Progress Note    NAME: Naresh Carroll   :  1945   MRN:  [id number]           Assessment / Plan:  Acute Metabolic Encephalopathy with Underlying Dementia   Urinary Tract Infection   · Patient more confused than baseline with fall at home. Daughter reports poor PO intake for several days PTA. · CT of Head  - No evidence for acute intracranial trauma. Severe white matter disease with multiple stable lacunar infarcts. Vertebral basilar calcification, can result in an insufficiency syndrome. · Urinalysis with bacteriuria and pyuria, + leukocyte esterase   · Urine culture taken in ED with contaminate - resent second culture, repeat urine culture pending  · Continue Levaquin (allergy to cephalosporins)  - Day 3    · Daughter stated that her mother's dementia has progressed over the past few months and she is in the process of transitioning patient to a private home with live in care. Conjunctivitis to both eyes  · Both eyes red with thick drainage  · Start Polytrim drops     Urinary Retention likely multifactorial  -Urology consulted due to continued urinary retention. Joseph placed today due to urinary retention. She will need outpatient follow-up with urology. -Adjust antibiotics based on culture results.  -Consider starting on medications for urinary retention after treatment of UTI. Orthostatic Hypotension  Fall PTA  Recheck orthostatic vitals. Start below-knee compressions. Hypertension  Hyperlipidemia   · Continue amlodipine, lisinopril, ASA and atorvastatin   · Monitor     Chronic Kidney Disease Stage III   · Cr at baseline   · Avoid Nephrotoxic Medications    History of Seizure continue Keppra   History of Stroke continue ASA and atorvastatin   Hypothyroidism continue synthroid     Disposition:  Discharge in a.m. once urine culture results are final and if no longer orthostatic.    25.0 - 29.9 Overweight / Body mass index is 28.2 kg/m².     Code status: DNR  Prophylaxis: Lovenox  Recommended Disposition: Home w/Family     Subjective:     Chief Complaint / Reason for Physician Visit  She continues to have difficulty with urination  She is alert, awake and confused  Daughter at bedside  Per daughter, confusion getting worse since the last 3 months            Objective:     VITALS:   Last 24hrs VS reviewed since prior progress note. Most recent are:  Patient Vitals for the past 24 hrs:   Temp Pulse Resp BP SpO2   01/26/21 1630 97.8 °F (36.6 °C) 93 18 (!) 116/52 93 %   01/26/21 1145 98 °F (36.7 °C) 84 18 129/81 95 %   01/26/21 0815 98.2 °F (36.8 °C) 88 18 131/72 94 %   01/26/21 0432 98.2 °F (36.8 °C) 84 20 131/71 94 %   01/26/21 0032 98 °F (36.7 °C) 89 18 (!) 142/67 92 %   01/25/21 2020 98.4 °F (36.9 °C) 98 18 138/66 94 %       Intake/Output Summary (Last 24 hours) at 1/26/2021 1715  Last data filed at 1/26/2021 0847  Gross per 24 hour   Intake 300 ml   Output    Net 300 ml        I had a face to face encounter and independently examined this patient on 1/26/2021, as outlined below:    PHYSICAL EXAM:  General: Alert, awake, comfortable  EENT:  EOMI. Anicteric sclerae. MMM  Resp:  CTA bilaterally, no wheezing or rales. No accessory muscle use  CV:  Regular  rhythm,  No edema  GI:  Soft, obese, Non tender. +Bowel sounds  Neurologic:  Alert and awake, confused  Psych:   Poor insight. Not anxious nor agitated  Skin:  No rashes.   No jaundice    Reviewed most current lab test results and cultures  YES  Reviewed most current radiology test results   YES  Review and summation of old records today    NO  Reviewed patient's current orders and MAR    YES  PMH/SH reviewed - no change compared to H&P  ________________________________________________________________________  Care Plan discussed with:    Comments   Patient x    Family  x Daughter    RN x    Care Manager x    Consultant                        Multidiciplinary team rounds were held today with , nursing, pharmacist and clinical coordinator. Patient's plan of care was discussed; medications were reviewed and discharge planning was addressed. ________________________________________________________________________  Total NON critical care TIME:  25   Minutes    Total CRITICAL CARE TIME Spent:   Minutes non procedure based      Comments   >50% of visit spent in counseling and coordination of care x    ________________________________________________________________________  Zackary Veliz MD     Procedures: see electronic medical records for all procedures/Xrays and details which were not copied into this note but were reviewed prior to creation of Plan. LABS:  I reviewed today's most current labs and imaging studies.   Pertinent labs include:  Recent Labs     01/25/21  0208 01/24/21  1150   WBC 10.3 12.8*   HGB 13.4 15.9   HCT 42.8 49.5*    211     Recent Labs     01/25/21  0208 01/24/21  1150    141   K 3.5 3.5    106   CO2 25 31   GLU 88 100   BUN 19 15   CREA 1.01 1.24*   CA 9.0 9.3   ALB  --  3.2*   TBILI  --  0.4   ALT  --  20       Signed: Zackary Veliz MD

## 2021-01-27 VITALS
SYSTOLIC BLOOD PRESSURE: 156 MMHG | BODY MASS INDEX: 28.2 KG/M2 | TEMPERATURE: 98.3 F | DIASTOLIC BLOOD PRESSURE: 75 MMHG | RESPIRATION RATE: 18 BRPM | HEART RATE: 89 BPM | OXYGEN SATURATION: 93 % | HEIGHT: 63 IN | WEIGHT: 159.17 LBS

## 2021-01-27 PROCEDURE — 74011250636 HC RX REV CODE- 250/636: Performed by: STUDENT IN AN ORGANIZED HEALTH CARE EDUCATION/TRAINING PROGRAM

## 2021-01-27 PROCEDURE — 99223 1ST HOSP IP/OBS HIGH 75: CPT | Performed by: PSYCHIATRY & NEUROLOGY

## 2021-01-27 PROCEDURE — 74011250637 HC RX REV CODE- 250/637: Performed by: STUDENT IN AN ORGANIZED HEALTH CARE EDUCATION/TRAINING PROGRAM

## 2021-01-27 RX ORDER — ATORVASTATIN CALCIUM 20 MG/1
20 TABLET, FILM COATED ORAL DAILY
Qty: 30 TAB | Refills: 0 | Status: SHIPPED | OUTPATIENT
Start: 2021-01-27

## 2021-01-27 RX ORDER — POLYMYXIN B SULFATE AND TRIMETHOPRIM 1; 10000 MG/ML; [USP'U]/ML
1 SOLUTION OPHTHALMIC 4 TIMES DAILY
Qty: 2 ML | Refills: 0 | Status: SHIPPED | OUTPATIENT
Start: 2021-01-27 | End: 2021-02-03

## 2021-01-27 RX ADMIN — CALCIUM CARBONATE-VITAMIN D TAB 500 MG-200 UNIT 1 TABLET: 500-200 TAB at 09:28

## 2021-01-27 RX ADMIN — MEMANTINE HYDROCHLORIDE 5 MG: 5 TABLET ORAL at 09:28

## 2021-01-27 RX ADMIN — Medication 10 ML: at 05:53

## 2021-01-27 RX ADMIN — OXYCODONE HYDROCHLORIDE AND ACETAMINOPHEN 500 MG: 500 TABLET ORAL at 09:28

## 2021-01-27 RX ADMIN — POLYMYXIN B SULFATE, TRIMETHOPRIM SULFATE 1 DROP: 10000; 1 SOLUTION/ DROPS OPHTHALMIC at 09:00

## 2021-01-27 RX ADMIN — ENOXAPARIN SODIUM 40 MG: 40 INJECTION SUBCUTANEOUS at 09:27

## 2021-01-27 RX ADMIN — AMLODIPINE BESYLATE 5 MG: 5 TABLET ORAL at 09:28

## 2021-01-27 RX ADMIN — ATORVASTATIN CALCIUM 20 MG: 20 TABLET, FILM COATED ORAL at 09:28

## 2021-01-27 RX ADMIN — ASPIRIN 81 MG: 81 TABLET, CHEWABLE ORAL at 09:28

## 2021-01-27 RX ADMIN — LISINOPRIL 20 MG: 20 TABLET ORAL at 09:28

## 2021-01-27 RX ADMIN — LEVOTHYROXINE SODIUM 100 MCG: 0.1 TABLET ORAL at 09:28

## 2021-01-27 NOTE — DISCHARGE SUMMARY
Hospitalist Discharge Summary     Patient ID:  Valery Up  [id number]  76 y.o.  1945 1/24/2021    PCP on record: Christiana Jordan MD    Admit date: 1/24/2021  Discharge date and time: 1/27/2021    Excerpted HPI from H&P of Rico Corbett MD:  Valery Up is a 66 y.o.  female with past medical history significant for CVA and dementia who presents with ground-level fall. Patient has dementia. She is confused at baseline and does not have recollection of the event. Does not know why she is in the hospital.  Hx taked from daughter Maria C Zarate over the phone. Per daughter, patient sleeps with her partner. This morning she was noted to have urinated on herself and seemed more disoriented than usual.  She was later found on the floor. Details of the fall unknown. Over the last few days she was also noted to have poor oral intake and nausea. Patient states she feels well and denies dysuria, frequency, fever, cough, chest pain, diarrhea, or abdominal pain. .  Patient was admitted here in November for altered mental status and seizure-like activity. She was discharged on Keppra. DISCHARGE SUMMARY/HOSPITAL COURSE:  for full details see H&P, daily progress notes, labs, consult notes. DISCHARGE DIAGNOSES:  Acute Metabolic Encephalopathy with Underlying Dementia, improving  Urinary Tract Infection   · CT of Head 1/24 - No evidence for acute intracranial trauma. Severe white matter disease with multiple stable lacunar infarcts.  Vertebral basilar calcification, can result in an insufficiency syndrome  · Urinalysis with bacteriuria and pyuria, + leukocyte esterase   · Urine culture in ED with contaminate - second culture, repeat urine culture no growth  · S/p Levaquin (allergy to cephalosporins)  - Day 4  · Daughter stated that her mother's dementia has progressed over the past few months and she is in the process of transitioning patient to a private home with live in care      Conjunctivitis to both eyes  · Both eyes red with thick drainage  · Polytrim drops      Urinary Retention likely multifactorial  -Urology consulted, Joseph placed, will need outpatient follow-up with urology  -f/u outpatient     Orthostatic Hypotension, improving  Fall PTA  Compression stockings     Hypertension, overall controlled  Hyperlipidemia   · Continue amlodipine, lisinopril, ASA and atorvastatin   · Monitor      Chronic Kidney Disease Stage III   · Cr at baseline, normal  · Avoid Nephrotoxic Medications     History of Seizure continue Keppra   History of Stroke continue ASA and atorvastatin   Hypothyroidism continue synthroid      25.0 - 29.9 Overweight / Body mass index is 28.2 kg/m².     Code status: DNR  Recommended Disposition: Home w/Family HH    CONSULTATIONS:  IP CONSULT TO UROLOGY  IP CONSULT TO NEUROLOGY    Patient seen and examined by me on discharge day.  Pertinent Findings:  Gen:    Not in distress  Chest: Clear lungs  CVS:   Regular rhythm.  No edema  Abd:  Soft, not distended, not tender  Neuro:  Alert,   _______________________________________________________________________  DISCHARGE MEDICATIONS:   Current Discharge Medication List      START taking these medications    Details   atorvastatin (LIPITOR) 20 mg tablet Take 1 Tab by mouth daily.  Qty: 30 Tab, Refills: 0      trimethoprim-polymyxin b (POLYTRIM) ophthalmic solution Administer 1 Drop to both eyes four (4) times daily for 7 days.  Qty: 2 mL, Refills: 0         CONTINUE these medications which have NOT CHANGED    Details   levETIRAcetam (KEPPRA) 500 mg tablet Take 1 Tab by mouth two (2) times a day.  Qty: 60 Tab, Refills: 0      aspirin 81 mg chewable tablet Take 1 Tab by mouth daily.  Qty: 30 Tab, Refills: 0      levothyroxine (SYNTHROID) 100 mcg tablet Take 100 mcg by mouth Daily (before breakfast).      memantine ER (NAMENDA XR) 14 mg capsule Take 14 mg by mouth daily.      calcium-cholecalciferol, d3, 500 mg(1,250mg)  -400 unit chew Take 2 Tabs by mouth daily. amLODIPine (NORVASC) 5 mg tablet Take 5 mg by mouth daily. donepezil (ARICEPT) 5 mg tablet Take 5 mg by mouth nightly. latanoprost (XALATAN) 0.005 % ophthalmic solution Administer 1 Drop to both eyes nightly. lisinopril (PRINIVIL, ZESTRIL) 20 mg tablet Take 20 mg by mouth daily. STOP taking these medications       ascorbic acid, vitamin C, (Vitamin C) 500 mg chew Comments:   Reason for Stopping:         aspirin/salicylamide/caffeine (BC HEADACHE POWDER PO) Comments:   Reason for Stopping:         naproxen sodium (ALEVE) 220 mg tablet Comments:   Reason for Stopping:         simvastatin (ZOCOR) 40 mg tablet Comments:   Reason for Stopping:             Patient Follow Up Instructions: Activity: Activity as tolerated  Diet: Regular Diet    Follow-up with PCP in 1 week.   D/c with mery 7-10 days, urology f/u scheduled for 2/3/2021    Follow-up Information     Follow up With Specialties Details Why Contact Info    Brandon Culver MD Family Medicine Go on 2/1/2021 For hospital follow up appointment at 10:30AM [address] [place] [address] [phone] [facility]  On 2/3/2021 Dr. Ranjith Diaz at 8:50 [address] [phone]        ________________________________________________________________    Risk of deterioration: Low    Condition at Discharge:  Stable  __________________________________________________________________    Disposition  Home with family and home health services    ____________________________________________________________________    Code Status: DNR/DNI  ___________________________________________________________________      Total time in minutes spent coordinating this discharge (includes going over instructions, follow-up, prescriptions, and preparing report for sign off to her PCP) :  >30 minutes    Signed:  Claudia Segal MD

## 2021-01-27 NOTE — PROGRESS NOTES
Patient and daughter provided discharge instructions including; medication changes and pharmacy for pickup, follow-up appointments and ordonez catheter care. Patient daughter verbalized understanding. Patient discharged with personal belongings including clothes and shoes.

## 2021-01-27 NOTE — ADT AUTH CERT NOTES
Additional Clinical Requested by Yaron Melendez RN 
 
  
Review Status Review Entered In Primary 1/27/2021 14:30  
  
Criteria Review 1/27 
  
Neuro- 
IMPRESSION/RECOMMENDATIONS: 
66 y. o. female who presents with altered mental status.  Patient does have dementia and presents altered mental status in setting of dementia secondary to urinary tract infection.  Treatment as per primary team. 
  
The patient has had symptoms of seizure during the last admission. Joey Suresh was started on Keppra but had side effects and hence that had to be discontinued. Joey Suresh is on no antiseizure medication at this time. Gigi Wilkins is not having any seizures.  We will just continue to observe rather than start her on a new medications and that might make her more drowsy. 
  
Patient does have essential tremors.  It is not bothering the patient. Zofia Betancourt has been having essential tremors for a number of years. Jenni Driver will continue to observe. 
  
For dementia, continue Aricept 5 mg daily and Namenda 5 mg p.o. twice daily. 
  
Hospitalist Discharge Summary  
  
  
Admit date: 1/24/2021 Discharge date and time: 1/27/2021 
  
Excerpted HPI from H&P of Betelhem Chipper Bumpers, MD: 
 66 y.o.   female with past medical history significant for CVA and dementia who presents with ground-level fall.  Patient has dementia.  She is confused at baseline and does not have recollection of the event.  Does not know why she is in the hospital.  Hx taked from [address] [place] the phone.  Per daughter, patient sleeps with her partner.  This morning she was noted to have urinated on herself and seemed more disoriented than usual.  She was later found on the floor.  Details of the fall unknown.  Over the last few days she was also noted to have poor oral intake and nausea.  Patient states she feels well and denies dysuria, frequency, fever, cough, chest pain, diarrhea, or abdominal pain. Laura Esteves was admitted here in November for altered mental status and seizure-like activity.  [facility] full details see H&P, daily progress notes, labs, consult notes.  
  
DISCHARGE DIAGNOSES: 
Acute Metabolic Encephalopathy with Underlying Dementia, improving Urinary Tract Infection · CT of Head 1/24 - No evidence for acute intracranial trauma. Severe white matter disease with multiple stable lacunar infarcts. Vertebral basilar calcification, can result in an insufficiency syndrome · Urinalysis with bacteriuria and pyuria, + leukocyte esterase · Urine culture in ED with contaminate - second culture, repeat urine culture no growth · S/p Levaquin (allergy to cephalosporins)  - Day 4 · Daughter stated that her mother's dementia has progressed over the past few months and she is in the process of transitioning patient to a private home with live in care 
   
Conjunctivitis to both eyes · Both eyes red with thick drainage · Polytrim drops  
  
Urinary Retention likely multifactorial 
-Urology consulted, Joseph placed, will need outpatient follow-up with urology 
-f/u outpatient 
  
Orthostatic Hypotension, improving Fall PTA Compression stockings 
  
Hypertension, overall controlled Hyperlipidemia · Continue amlodipine, lisinopril, ASA and atorvastatin · Monitor  
  
Chronic Kidney Disease Stage III · Cr at baseline, normal 
· Avoid Nephrotoxic Medications 
  
History of Seizure continue Keppra History of Stroke continue ASA and atorvastatin Hypothyroidism continue synthroid  
  
25.0 - 29.9 Overweight / Body mass index is 28.2 kg/m². 
  
Code status: DNR Recommended Disposition: Home w/Family HH 
  
CONSULTATIONS: 
IP CONSULT TO UROLOGY 
IP CONSULT TO NEUROLOGY 
  
Patient seen and examined by me on discharge day. Pertinent Findings: 
Gen:    Not in distress Chest:  Clear lungs CVS:    Regular rhythm.  No edema Abd:     Soft, not distended, not tender Neuro:  Alert  
  
Urinary Tract Infection (UTI) - Care Day 3 (1/26/2021) by Teena Chairez RN 
 
  
Review Status Review Entered Completed 1/27/2021 14:23  
  
Criteria Review Care Day: 3 Care Date: 1/26/2021 Level of Care: Telemetry Guideline Day 2 Level Of Care (X) Floor 1/27/2021 14:23:42 EST by Raimundo Franco Clinical Status   
(X) * Hypotension absent 1/27/2021 14:23:42 EST by Teena Chairez   
  absent   
( ) * Mental status at baseline 1/27/2021 14:23:42 EST by Jasbir Anderson She is alert, awake and confused   
(X) * Afebrile or fever improved 1/27/2021 14:23:42 EST by Teena Chairez   
  afebrile   
(X) * Vomiting absent or improved 1/27/2021 14:23:42 EST by Jasbir Anderson absent Activity   
(X) * Ambulatory 1/27/2021 14:23:42 EST by Teena Chairez   
  Observed ambulating in room Routes (X) Advance diet as tolerated 1/27/2021 14:23:42 EST by Teena Chairez   
  Regular diet ordered Interventions   
(X) * Reversible urinary system abnormality (eg, obstruction, abscess) absent, addressed, or to be treated at next level of care 1/27/2021 14:23:42 EST by Stacie Landers addressed. ( ) WBC   
1/27/2021 14:23:42 EST by Angela Serrano   
  No WBC to report ( ) Renal function tests 1/27/2021 14:23:42 EST by Angela Serrano   
  no renal function to report Medications (X) Antibiotics 1/27/2021 14:23:42 EST by Angela Serrano   
  Levaquin 750mg IV q48h * Milestone Additional Notes Day 3: 1/26 IM-  
  
Subjective:  
 She continues to have difficulty with urination She is alert, awake and confused VITALS:   
  
             Temp Pulse Resp BP SpO2  
01/26/21 1630 97.8 °F (36.6 °C) 93 18 116/52 93 % 01/26/21 1145 98 °F (36.7 °C) 84 18 129/81 95 % 01/26/21 0815 98.2 °F (36.8 °C) 88 18 131/72 94 % 01/26/21 0432 98.2 °F (36.8 °C) 84 20 131/71 94 % 01/26/21 0032 98 °F (36.7 °C) 89 18 142/67 92 % 01/25/21 2020 98.4 °F (36.9 °C) 98 18 138/66 94 % PHYSICAL EXAM:  
General:          Alert, awake, comfortable EENT:              EOMI. Anicteric sclerae. MMM Resp:               CTA bilaterally, no wheezing or rales.  No accessory muscle use CV:                  Regular  rhythm,  No edema GI:                   Soft, obese, Non tender.  +Bowel sounds Neurologic:       Alert and awake, confused Psych:   Poor insight. Not anxious nor agitated Skin:                No rashes.  No jaundice UC-No growth seen IM- Assessment / Plan:  
Acute Metabolic Encephalopathy with Underlying Dementia Urinary Tract Infection · Patient more confused than baseline with fall at home. Daughter reports poor PO intake for several days PTA. · CT of Head 1/24 - No evidence for acute intracranial trauma. Severe white matter disease with multiple stable lacunar infarcts. Vertebral basilar calcification, can result in an insufficiency syndrome. · Urinalysis with bacteriuria and pyuria, + leukocyte esterase · Urine culture taken in ED with contaminate - resent second culture, repeat urine culture pending · Continue Levaquin (allergy to cephalosporins)  - Day 3  
   
· Daughter stated that her mother's dementia has progressed over the past few months and she is in the process of transitioning patient to a private home with live in care.   
   
   
Conjunctivitis to both eyes · Both eyes red with thick drainage · Start Polytrim drops   
   
Urinary Retention likely multifactorial  
-Urology consulted due to continued urinary retention.  Ordonez placed today due to urinary retention.  She will need outpatient follow-up with urology. -Adjust antibiotics based on culture results.  
-Consider starting on medications for urinary retention after treatment of UTI.  
   
Orthostatic Hypotension Fall PTA Recheck orthostatic vitals.  Start below-knee compressions.  
   
Hypertension Hyperlipidemia · Continue amlodipine, lisinopril, ASA and atorvastatin · Monitor   
   
Chronic Kidney Disease Stage III · Cr at baseline · Avoid Nephrotoxic Medications Urology-  
Assessment:  
   
66 y.o. female with a PMH of dementia and CVA who presents to the ED on 1/24/21 with a GLF. Over the last few days she was noted to have poor oral intake and nausea. She is being treated for her GLF and UTI. Since admission she has been in urinary retention requiring intermittent catheterization.   
   
Recommendations:  
   
1. Ordonez placed for urinary retention 2. Keep ordonez 7-10 days for bladder rest  
3. Will need outpatient f/up for voiding trial  
4. Continue culture directed abx  
   
Follow up scheduled for 2/3/2021 and will add to DC instructions PT-  
ASSESSMENT Patient continues with skilled PT services and is progressing towards goals. Patient continues to be very confused and needs frequent reorientation during session.  Overall SBA to come to EOB and has good sitting balance.  Sit to stand with CGA and able to take a few steps to chair with Parth.  Patient balance is overall fair at best and confusion increases her risk for falls.  Session limited as RN present and needing patient to return to supine in order for straight cath.  Patient will likely need SNF given confusion.

## 2021-01-27 NOTE — CONSULTS
NEUROLOGY NOTE     Chief Complaint   Patient presents with    Fall    Dizziness       Reason for Consult  I have been asked by Merlin Lagos, MD to see the patient in neurological consultation to render advice and opinion regarding tremors versus seizures    HPI  The patient is a 68-year-old woman who came to the hospital on 1/24/2020 because of a ground-level fall. Patient does have history of stroke and dementia. She is confused at baseline and does not have any recollection of the event. She was found to have urinated on herself and seemed more disoriented than usual.  Patient was found to have UTI and is on antibiotics. The patient was seen by neurology in 2011 for a stroke. The patient was again evaluated by neurology on 11/25/2020 for encephalopathy. EEG was done which was normal.  Patient was noted to have staring off spell with intermittent eye rolling back in her forehead and blinking. She did have incontinence associated with that. While in the hospital she did have episodes of staring spells with decreased responsiveness lasting for 60 to 90 seconds. Patient was started on Keppra 500 mg p.o. twice daily. Keppra had to be discontinued because of GI side effects. In December she was recommended to start the [address] again.     Neurology has been consulted for tremors versus seizures    ROS  A ten system review of constitutional, cardiovascular, respiratory, musculoskeletal, endocrine, skin, SHEENT, genitourinary, psychiatric and neurologic systems was obtained and is unremarkable except as stated in HPI     PMH  Past Medical History:   Diagnosis Date    CAD (coronary artery disease)     high cholesterol    Dementia ([address]     Endocrine disease     thyroid    Heart failure ([address]     MI    Psychiatric disorder     anxiety, depression       FH  Family History   Problem Relation Age of Onset    Hypertension Mother     Diabetes Mother     Emphysema Father     Asthma Daughter SH  Social History     Socioeconomic History    Marital status:      Spouse name: Not on file    Number of children: Not on file    Years of education: Not on file    Highest education level: Not on file   Tobacco Use    Smoking status: Current Every Day Smoker     Packs/day: 0.50     Years: 10.00     Pack years: 5.00    Smokeless tobacco: Former User   Substance and Sexual Activity    Alcohol use: No    Drug use: No       ALLERGIES  Allergies   Allergen Reactions    Augmentin [Amoxicillin-Pot Clavulanate] Nausea Only     Abdominal pain    Belladonna Alkaloids Swelling     Swelling of tongue    Carbocaine [Mepivacaine] Other (comments)     Chest pain    Cortisone Other (comments)     depression    Decadron [Dexamethasone] Other (comments)     Altered mental status    Erythromycin Nausea Only    Meprobamate Swelling     tongue    Paxil [Paroxetine Hcl] Other (comments)     Headache, syncope    Vantin [Cefpodoxime] Unknown (comments)    Vibramycin Calcium Nausea Only    Zoloft [Sertraline] Other (comments)     Headache, syncope       PHYSICAL EXAMINATION:   Patient Vitals for the past 24 hrs:   Temp Pulse Resp BP SpO2   01/27/21 1114 98.3 °F (36.8 °C) 89 18 (!) 156/75 93 %   01/27/21 0749 98.2 °F (36.8 °C) 80 18 (!) 141/71 94 %   01/27/21 0428 97.4 °F (36.3 °C) 84 16 113/65 92 %   01/26/21 2347 97 °F (36.1 °C) 82 18 115/64 93 %   01/26/21 2300     93 %   01/26/21 2018 98.5 °F (36.9 °C) 63 16 (!) 165/77 92 %   01/26/21 1630 97.8 °F (36.6 °C) 93 18 (!) 116/52 93 %        General:   General appearance: Pt is in no acute distress   Distal pulses are preserved  Fundoscopic exam: attempted    Neurological Examination:   Mental Status:  AAO x2. Speech is fluent. Follows commands, has abnormal abnormal fund of knowledge, attention, short term recall, comprehension and insight. Cranial Nerves: Visual fields are full. PERRL, Extraocular movements are full. Facial sensation intact.  Facial movement intact. Hearing intact to conversation. Palate elevates symmetrically. Shoulder shrug symmetric. Tongue midline. Motor: Strength is 5 out of 5 in upper extremities and 4 out of 5 in lower extremities. Normal tone. No atrophy. Sensation: Light touch - Normal    Coordination/Cerebellar: Intact to finger-nose-finger     LAB DATA REVIEWED:    No results found for this or any previous visit (from the past 24 hour(s)). Imaging review:  1/24/2021  CT scan of the head  No evidence of acute intracranial trauma. Severe white matter disease with multiple stable lacunar infarct. 11/25/2020  EEG  Normal    HOME MEDS  Prior to Admission Medications   Prescriptions Last Dose Informant Patient Reported? Taking? amLODIPine (NORVASC) 5 mg tablet  Child Yes No   Sig: Take 5 mg by mouth daily. ascorbic acid, vitamin C, (Vitamin C) 500 mg chew   Yes No   Sig: Take 500 mg by mouth daily. aspirin 81 mg chewable tablet   No No   Sig: Take 1 Tab by mouth daily. aspirin/salicylamide/caffeine (BC HEADACHE POWDER PO)  Child Yes No   Sig: Take 0.5 Packets by mouth two (2) times daily as needed for Pain. calcium-cholecalciferol, d3, 500 mg(1,250mg) -400 unit chew   Yes No   Sig: Take 2 Tabs by mouth daily. donepezil (ARICEPT) 5 mg tablet  Child Yes No   Sig: Take 5 mg by mouth nightly. latanoprost (XALATAN) 0.005 % ophthalmic solution  Child Yes No   Sig: Administer 1 Drop to both eyes nightly. levETIRAcetam (KEPPRA) 500 mg tablet   No No   Sig: Take 1 Tab by mouth two (2) times a day. levothyroxine (SYNTHROID) 100 mcg tablet   Yes No   Sig: Take 100 mcg by mouth Daily (before breakfast). lisinopril (PRINIVIL, ZESTRIL) 20 mg tablet  Child Yes No   Sig: Take 20 mg by mouth daily. memantine ER (NAMENDA XR) 14 mg capsule   Yes No   Sig: Take 14 mg by mouth daily.    naproxen sodium (ALEVE) 220 mg tablet  Child Yes No   Sig: Take 220 mg by mouth two (2) times daily as needed for Pain.   simvastatin (ZOCOR) 40 mg tablet  Child Yes No   Sig: Take 40 mg by mouth every evening. Facility-Administered Medications: None       CURRENT MEDS  Current Facility-Administered Medications   Medication Dose Route Frequency    trimethoprim-polymyxin b (POLYTRIM) 10,000 unit- 1 mg/mL ophthalmic solution 1 Drop  1 Drop Both Eyes BID    amLODIPine (NORVASC) tablet 5 mg  5 mg Oral DAILY    aspirin chewable tablet 81 mg  81 mg Oral DAILY    calcium-vitamin D (OS-MARYLOU +D3) 500 mg-200 unit per tablet 1 Tab  1 Tab Oral BID WITH MEALS    donepeziL (ARICEPT) tablet 5 mg  5 mg Oral QHS    latanoprost (XALATAN) 0.005 % ophthalmic solution 1 Drop  1 Drop Both Eyes QHS    levothyroxine (SYNTHROID) tablet 100 mcg  100 mcg Oral ACB    lisinopriL (PRINIVIL, ZESTRIL) tablet 20 mg  20 mg Oral DAILY    memantine (NAMENDA) tablet 5 mg  5 mg Oral BID    atorvastatin (LIPITOR) tablet 20 mg  20 mg Oral DAILY    sodium chloride (NS) flush 5-40 mL  5-40 mL IntraVENous Q8H    enoxaparin (LOVENOX) injection 40 mg  40 mg SubCUTAneous DAILY    ascorbic acid (vitamin C) (VITAMIN C) tablet 500 mg  500 mg Oral DAILY       IMPRESSION/RECOMMENDATIONS:  Chris Beasley is a 66 y.o. female who presents with altered mental status. Patient does have dementia and presents altered mental status in setting of dementia secondary to urinary tract infection. Treatment as per primary team.    The patient has had symptoms of seizure during the last admission. Patient was started on Keppra but had side effects and hence that had to be discontinued. Patient is on no antiseizure medication at this time. She is not having any seizures. We will just continue to observe rather than start her on a new medications and that might make her more drowsy. Patient does have essential tremors. It is not bothering the patient. He has been having essential tremors for a number of years. We will continue to observe.     For dementia, continue Aricept 5 mg daily and Namenda 5 mg p.o. twice daily. No further neuro work-up. Call with questions. Thank you very much for this consultation.      Chandu Telles MD  Neurologist

## 2021-01-27 NOTE — PROGRESS NOTES
ALONDRA Plan:    * Home with  (RN), f/u apts, & 24/7 family supervision    > CM is actively working to secure [facility] (RN) services for d/c; referrals pending  > PCP f/u apt & Urology f/u apt secured for d/c; details in AVS for reference  > Pt's daughter is present to provide transport at d/c    5:09 PM: Pt has not been accepted by any [facility] provider at this point in time. The referral sent to [facility] is currently pending until the agency hears back from pt's PCP. CM will f/u on the status of the referral tomorrow (1/28/21) to determine whether or not it has been accepted. CM contacted pt's daughter to relay update; daughter verbalized understanding. Pt's daughter requested for CM to contact her tomorrow with updates; CM to complete request.    2:30 PM: CM noted referrals sent via [employer] to [facility], [facility], [facility], and [facility] were declined. CM sent additional referral via [facility] to [facility] for review; referral pending. 1:47 PM: Referrals sent via Allscripts to [facility], [facility], [facility], and [address] [place] [address]; referrals pending. CM will report updates as they become available. Initial note: CM acknowledged d/c. CM reviewed pt's chart and discussed updates in IDR prior to moving forward with d/c planning. CM attempted to meet with pt at bedside to check in and discuss ALONDRA plan for d/c re: [facility] (RN), f/u apts, and 24/7 supervision. Upon conducting room visit, pt presented confused. CM contacted pt's daughter Kristian Diaz: [phone]) to check in and discuss [facility] plan for d/c. CM inquired if pt's daughter was interested in utilizing [facility] (RN) intervention at d/c; daughter declined. CM informed daughter that pt would be d/c with a ordonez & reiterated the risk for infection if the area is not well maintained; daughter confirmed she would like [facility] services secured for d/c.  No preference in [facility] provider identified; daughter agreeable to referral being sent to agency in network with pt's insurance. Copy of [facility] on chart. Pt's daughter reported she is at [facility] waiting to take pt home; CM noted information. To avoid delaying d/c, pt will return home with her daughter and CM will provide updates on the status of [place] services via telephone. CM inquired if pt's daughter had any additional questions, concerns, or needs related to d/c; daughter declined. Medicare pt's daughter has received, reviewed, and provided verbal consent for 2nd IM letter informing them of their right to appeal the discharge. Copy has been placed on pt bedside chart. No further CM needs identified. CM notified pt's nurse of d/c. Care Management Interventions  PCP Verified by CM: Yes  Mode of Transport at Discharge:  Other (see comment)(daughter)  Transition of Care Consult (CM Consult): Home Health  Discharge Durable Medical Equipment: No  Physical Therapy Consult: Yes  Occupational Therapy Consult: Yes  Speech Therapy Consult: No  Current Support Network: Family Lives Nearby, Own Home, Lives with Spouse  Confirm Follow Up Transport: Family  The Plan for Transition of Care is Related to the Following Treatment Goals : [place]  The Patient and/or Patient Representative was Provided with a Choice of Provider and Agrees with the Discharge Plan?: Yes  Name of the Patient Representative Who was Provided with a Choice of Provider and Agrees with the Discharge Plan: daughter Imani Polk)  [place] of Choice List was Provided with Basic Dialogue that Supports the Patient's Individualized Plan of Care/Goals, Treatment Preferences and Shares the Quality Data Associated with the Providers?: Yes  [place] Resource Information Provided?: No  Discharge Location  Discharge Placement: Home with home health    Evie Peters, [address] [place] [address], [address] [place] [address]

## 2021-01-27 NOTE — PROGRESS NOTES
End of Shift Note    Bedside shift change report given to(oncoming nurse) by Tracey Prabhakar RN (offgoing nurse). Report included the following information SBAR, Kardex, MAR, Accordion and Recent Results    Shift worked:  Night shift   Shift summary and any significant changes:     No new onset, confused     Concerns for physician to address:  No new onset, confused   Zone phone for oncoming shift:       Patient Information  Marcela Dumont  66 y.o.  1/24/2021 10:25 AM by Carolina Beaulieu MD. Marcela Dumont was admitted from Assisted Living    Problem List  Patient Active Problem List    Diagnosis Date Noted    UTI (urinary tract infection) 01/24/2021    HTN (hypertension) 12/21/2010    Unspecified hypothyroidism 12/21/2010    Other and unspecified hyperlipidemia 12/21/2010    Depressive disorder, not elsewhere classified 12/21/2010     Past Medical History:   Diagnosis Date    CAD (coronary artery disease)     high cholesterol    Dementia ([facility]     Endocrine disease     thyroid    Heart failure ([facility]     MI    Psychiatric disorder     anxiety, depression       Core Measures:  CVA: No Yes  CHF:No Yes  PNA:No No    Activity:  Activity Level: Up with Assistance  Number times ambulated in hallways past shift: 0  Number of times OOB to chair past shift: 2    Cardiac:   Cardiac Monitoring: Yes      Cardiac Rhythm: Normal sinus rhythm    Access:   Current line(s): PIV   Central Line? No Placement date  Reason Medically Necessary   PICC LINE? No Placement date Reason Medically Necessary     Genitourinary:   Urinary status: voiding  Urinary Catheter?  No Placement Date  Reason Medically Necessary     Respiratory:   O2 Device: Room air  Chronic home O2 use?: NO  Incentive spirometer at bedside: NO       GI:  Last Bowel Movement Date: 01/23/21([facility] patient)  Current diet:  DIET REGULAR  Passing flatus: YES  Tolerating current diet: YES  % Diet Eaten: 0 %    Pain Management:   Patient states pain is manageable on current regimen: YES    Skin:  Korey Score: 20  Interventions: float heels, limit briefs and nutritional support     Patient Safety:  Fall Score: Total Score: 4  Interventions: bed/chair alarm  High Fall Risk: Yes    DVT prophylaxis:  DVT prophylaxis Med- No  DVT prophylaxis SCD or AISHWARYA- No     Wounds: (If Applicable)  Wounds- No  Location     Active Consults:  IP CONSULT TO UROLOGY  IP CONSULT TO NEUROLOGY    Length of Stay:  Expected LOS: 3d 7h  Actual LOS: 3  Discharge Plan: Yes   Pt is alert and confused, non compliant. Joseph care administered.  Pt took all her meds as scheduled, no distress noted      Katy Mata RN [place] Pregnancy And Lactation Text: The risk during pregnancy and breastfeeding is uncertain with this medication.

## 2021-01-28 ENCOUNTER — HOME HEALTH ADMISSION (OUTPATIENT)
Dept: HOME HEALTH SERVICES | Facility: HOME HEALTH | Age: 76
End: 2021-01-28
Payer: MEDICARE

## 2021-01-28 LAB
ATRIAL RATE: 100 BPM
CALCULATED P AXIS, ECG09: 46 DEGREES
CALCULATED R AXIS, ECG10: 29 DEGREES
CALCULATED T AXIS, ECG11: 34 DEGREES
DIAGNOSIS, 93000: NORMAL
P-R INTERVAL, ECG05: 148 MS
Q-T INTERVAL, ECG07: 362 MS
QRS DURATION, ECG06: 84 MS
QTC CALCULATION (BEZET), ECG08: 466 MS
VENTRICULAR RATE, ECG03: 100 BPM

## 2021-01-30 ENCOUNTER — HOME CARE VISIT (OUTPATIENT)
Dept: SCHEDULING | Facility: HOME HEALTH | Age: 76
End: 2021-01-30
Payer: MEDICARE

## 2021-01-30 VITALS
RESPIRATION RATE: 20 BRPM | TEMPERATURE: 97.4 F | SYSTOLIC BLOOD PRESSURE: 158 MMHG | HEART RATE: 18 BPM | DIASTOLIC BLOOD PRESSURE: 86 MMHG | OXYGEN SATURATION: 95 %

## 2021-01-30 PROCEDURE — 3331090002 HH PPS REVENUE DEBIT

## 2021-01-30 PROCEDURE — 400013 HH SOC

## 2021-01-30 PROCEDURE — 3331090001 HH PPS REVENUE CREDIT

## 2021-01-30 PROCEDURE — G0299 HHS/HOSPICE OF RN EA 15 MIN: HCPCS

## 2021-01-30 PROCEDURE — 400018 HH-NO PAY CLAIM PROCEDURE

## 2021-01-31 PROCEDURE — 3331090001 HH PPS REVENUE CREDIT

## 2021-01-31 PROCEDURE — 3331090002 HH PPS REVENUE DEBIT

## 2021-02-01 PROCEDURE — 3331090002 HH PPS REVENUE DEBIT

## 2021-02-01 PROCEDURE — 3331090001 HH PPS REVENUE CREDIT

## 2021-02-02 ENCOUNTER — HOME CARE VISIT (OUTPATIENT)
Dept: SCHEDULING | Facility: HOME HEALTH | Age: 76
End: 2021-02-02
Payer: MEDICARE

## 2021-02-02 PROCEDURE — 3331090002 HH PPS REVENUE DEBIT

## 2021-02-02 PROCEDURE — G0300 HHS/HOSPICE OF LPN EA 15 MIN: HCPCS

## 2021-02-02 PROCEDURE — 3331090001 HH PPS REVENUE CREDIT

## 2021-02-03 ENCOUNTER — APPOINTMENT (OUTPATIENT)
Dept: GENERAL RADIOLOGY | Age: 76
End: 2021-02-03
Attending: EMERGENCY MEDICINE
Payer: MEDICARE

## 2021-02-03 ENCOUNTER — HOSPITAL ENCOUNTER (OUTPATIENT)
Age: 76
Setting detail: OBSERVATION
Discharge: NURSING, REHAB OR LONG-TERM CARE | End: 2021-02-09
Attending: EMERGENCY MEDICINE | Admitting: INTERNAL MEDICINE
Payer: MEDICARE

## 2021-02-03 ENCOUNTER — APPOINTMENT (OUTPATIENT)
Dept: CT IMAGING | Age: 76
End: 2021-02-03
Attending: EMERGENCY MEDICINE
Payer: MEDICARE

## 2021-02-03 DIAGNOSIS — G93.40 ENCEPHALOPATHY: Primary | ICD-10-CM

## 2021-02-03 PROBLEM — R55 SYNCOPE: Status: ACTIVE | Noted: 2021-02-03

## 2021-02-03 LAB
ALBUMIN SERPL-MCNC: 3 G/DL (ref 3.5–5)
ALBUMIN/GLOB SERPL: 0.8 {RATIO} (ref 1.1–2.2)
ALP SERPL-CCNC: 102 U/L (ref 45–117)
ALT SERPL-CCNC: 17 U/L (ref 12–78)
ANION GAP SERPL CALC-SCNC: 6 MMOL/L (ref 5–15)
APPEARANCE UR: CLEAR
AST SERPL-CCNC: 11 U/L (ref 15–37)
ATRIAL RATE: 83 BPM
BACTERIA URNS QL MICRO: NEGATIVE /HPF
BASOPHILS # BLD: 0.1 K/UL (ref 0–0.1)
BASOPHILS NFR BLD: 1 % (ref 0–1)
BILIRUB SERPL-MCNC: 0.4 MG/DL (ref 0.2–1)
BILIRUB UR QL: NEGATIVE
BUN SERPL-MCNC: 12 MG/DL (ref 6–20)
BUN/CREAT SERPL: 9 (ref 12–20)
CALCIUM SERPL-MCNC: 9.2 MG/DL (ref 8.5–10.1)
CALCULATED P AXIS, ECG09: 44 DEGREES
CALCULATED R AXIS, ECG10: 7 DEGREES
CALCULATED T AXIS, ECG11: 40 DEGREES
CHLORIDE SERPL-SCNC: 102 MMOL/L (ref 97–108)
CO2 SERPL-SCNC: 31 MMOL/L (ref 21–32)
COLOR UR: NORMAL
CREAT SERPL-MCNC: 1.34 MG/DL (ref 0.55–1.02)
DIAGNOSIS, 93000: NORMAL
DIFFERENTIAL METHOD BLD: ABNORMAL
EOSINOPHIL # BLD: 0.3 K/UL (ref 0–0.4)
EOSINOPHIL NFR BLD: 3 % (ref 0–7)
EPITH CASTS URNS QL MICRO: NORMAL /LPF
ERYTHROCYTE [DISTWIDTH] IN BLOOD BY AUTOMATED COUNT: 15.7 % (ref 11.5–14.5)
GLOBULIN SER CALC-MCNC: 3.6 G/DL (ref 2–4)
GLUCOSE SERPL-MCNC: 110 MG/DL (ref 65–100)
GLUCOSE UR STRIP.AUTO-MCNC: NEGATIVE MG/DL
HCT VFR BLD AUTO: 48.2 % (ref 35–47)
HGB BLD-MCNC: 15.2 G/DL (ref 11.5–16)
HGB UR QL STRIP: NEGATIVE
HYALINE CASTS URNS QL MICRO: NORMAL /LPF (ref 0–5)
IMM GRANULOCYTES # BLD AUTO: 0.1 K/UL (ref 0–0.04)
IMM GRANULOCYTES NFR BLD AUTO: 1 % (ref 0–0.5)
KETONES UR QL STRIP.AUTO: NEGATIVE MG/DL
LACTATE BLD-SCNC: 1.21 MMOL/L (ref 0.4–2)
LACTATE BLD-SCNC: 2.17 MMOL/L (ref 0.4–2)
LEUKOCYTE ESTERASE UR QL STRIP.AUTO: NEGATIVE
LYMPHOCYTES # BLD: 0.9 K/UL (ref 0.8–3.5)
LYMPHOCYTES NFR BLD: 10 % (ref 12–49)
MCH RBC QN AUTO: 28.7 PG (ref 26–34)
MCHC RBC AUTO-ENTMCNC: 31.5 G/DL (ref 30–36.5)
MCV RBC AUTO: 91.1 FL (ref 80–99)
MONOCYTES # BLD: 0.8 K/UL (ref 0–1)
MONOCYTES NFR BLD: 8 % (ref 5–13)
NEUTS SEG # BLD: 7.2 K/UL (ref 1.8–8)
NEUTS SEG NFR BLD: 77 % (ref 32–75)
NITRITE UR QL STRIP.AUTO: NEGATIVE
NRBC # BLD: 0 K/UL (ref 0–0.01)
NRBC BLD-RTO: 0 PER 100 WBC
P-R INTERVAL, ECG05: 142 MS
PH UR STRIP: 7.5 [PH] (ref 5–8)
PLATELET # BLD AUTO: 199 K/UL (ref 150–400)
PMV BLD AUTO: 12.2 FL (ref 8.9–12.9)
POTASSIUM SERPL-SCNC: 3.7 MMOL/L (ref 3.5–5.1)
PROT SERPL-MCNC: 6.6 G/DL (ref 6.4–8.2)
PROT UR STRIP-MCNC: NEGATIVE MG/DL
Q-T INTERVAL, ECG07: 386 MS
QRS DURATION, ECG06: 78 MS
QTC CALCULATION (BEZET), ECG08: 453 MS
RBC # BLD AUTO: 5.29 M/UL (ref 3.8–5.2)
RBC #/AREA URNS HPF: NORMAL /HPF (ref 0–5)
SODIUM SERPL-SCNC: 139 MMOL/L (ref 136–145)
SP GR UR REFRACTOMETRY: <1.005 (ref 1–1.03)
TROPONIN I SERPL-MCNC: <0.05 NG/ML
TSH SERPL DL<=0.05 MIU/L-ACNC: 8.92 UIU/ML (ref 0.36–3.74)
UA: UC IF INDICATED,UAUC: NORMAL
UROBILINOGEN UR QL STRIP.AUTO: 0.2 EU/DL (ref 0.2–1)
VENTRICULAR RATE, ECG03: 83 BPM
WBC # BLD AUTO: 9.2 K/UL (ref 3.6–11)
WBC URNS QL MICRO: NORMAL /HPF (ref 0–4)

## 2021-02-03 PROCEDURE — 65390000012 HC CONDITION CODE 44 OBSERVATION

## 2021-02-03 PROCEDURE — 96365 THER/PROPH/DIAG IV INF INIT: CPT

## 2021-02-03 PROCEDURE — 74011000258 HC RX REV CODE- 258: Performed by: EMERGENCY MEDICINE

## 2021-02-03 PROCEDURE — 99285 EMERGENCY DEPT VISIT HI MDM: CPT

## 2021-02-03 PROCEDURE — 80053 COMPREHEN METABOLIC PANEL: CPT

## 2021-02-03 PROCEDURE — 3331090001 HH PPS REVENUE CREDIT

## 2021-02-03 PROCEDURE — 77030005513 HC CATH URETH FOL11 MDII -B

## 2021-02-03 PROCEDURE — 65270000029 HC RM PRIVATE

## 2021-02-03 PROCEDURE — 81001 URINALYSIS AUTO W/SCOPE: CPT

## 2021-02-03 PROCEDURE — 36415 COLL VENOUS BLD VENIPUNCTURE: CPT

## 2021-02-03 PROCEDURE — 83605 ASSAY OF LACTIC ACID: CPT

## 2021-02-03 PROCEDURE — 96372 THER/PROPH/DIAG INJ SC/IM: CPT

## 2021-02-03 PROCEDURE — 96361 HYDRATE IV INFUSION ADD-ON: CPT

## 2021-02-03 PROCEDURE — 71045 X-RAY EXAM CHEST 1 VIEW: CPT

## 2021-02-03 PROCEDURE — 74011250636 HC RX REV CODE- 250/636: Performed by: EMERGENCY MEDICINE

## 2021-02-03 PROCEDURE — 70450 CT HEAD/BRAIN W/O DYE: CPT

## 2021-02-03 PROCEDURE — 3331090002 HH PPS REVENUE DEBIT

## 2021-02-03 PROCEDURE — 84484 ASSAY OF TROPONIN QUANT: CPT

## 2021-02-03 PROCEDURE — 85025 COMPLETE CBC W/AUTO DIFF WBC: CPT

## 2021-02-03 PROCEDURE — 93005 ELECTROCARDIOGRAM TRACING: CPT

## 2021-02-03 PROCEDURE — 94760 N-INVAS EAR/PLS OXIMETRY 1: CPT

## 2021-02-03 PROCEDURE — 87040 BLOOD CULTURE FOR BACTERIA: CPT

## 2021-02-03 PROCEDURE — 51702 INSERT TEMP BLADDER CATH: CPT

## 2021-02-03 PROCEDURE — 92610 EVALUATE SWALLOWING FUNCTION: CPT

## 2021-02-03 PROCEDURE — 74011250636 HC RX REV CODE- 250/636: Performed by: INTERNAL MEDICINE

## 2021-02-03 PROCEDURE — 84443 ASSAY THYROID STIM HORMONE: CPT

## 2021-02-03 RX ORDER — SODIUM CHLORIDE, SODIUM LACTATE, POTASSIUM CHLORIDE, CALCIUM CHLORIDE 600; 310; 30; 20 MG/100ML; MG/100ML; MG/100ML; MG/100ML
100 INJECTION, SOLUTION INTRAVENOUS CONTINUOUS
Status: DISCONTINUED | OUTPATIENT
Start: 2021-02-03 | End: 2021-02-04

## 2021-02-03 RX ORDER — GUAIFENESIN 100 MG/5ML
81 LIQUID (ML) ORAL DAILY
Status: DISCONTINUED | OUTPATIENT
Start: 2021-02-04 | End: 2021-02-09 | Stop reason: HOSPADM

## 2021-02-03 RX ORDER — ATORVASTATIN CALCIUM 20 MG/1
20 TABLET, FILM COATED ORAL
Status: DISCONTINUED | OUTPATIENT
Start: 2021-02-03 | End: 2021-02-09 | Stop reason: HOSPADM

## 2021-02-03 RX ORDER — VANCOMYCIN/0.9 % SOD CHLORIDE 1.5G/250ML
1500 PLASTIC BAG, INJECTION (ML) INTRAVENOUS
Status: COMPLETED | OUTPATIENT
Start: 2021-02-03 | End: 2021-02-03

## 2021-02-03 RX ORDER — LISINOPRIL 20 MG/1
20 TABLET ORAL DAILY
Status: DISCONTINUED | OUTPATIENT
Start: 2021-02-04 | End: 2021-02-09 | Stop reason: HOSPADM

## 2021-02-03 RX ORDER — MEMANTINE HYDROCHLORIDE 10 MG/1
5 TABLET ORAL DAILY
Status: DISCONTINUED | OUTPATIENT
Start: 2021-02-04 | End: 2021-02-09 | Stop reason: HOSPADM

## 2021-02-03 RX ORDER — AMLODIPINE BESYLATE 5 MG/1
5 TABLET ORAL DAILY
Status: DISCONTINUED | OUTPATIENT
Start: 2021-02-04 | End: 2021-02-09 | Stop reason: HOSPADM

## 2021-02-03 RX ORDER — HEPARIN SODIUM 5000 [USP'U]/ML
5000 INJECTION, SOLUTION INTRAVENOUS; SUBCUTANEOUS EVERY 8 HOURS
Status: DISCONTINUED | OUTPATIENT
Start: 2021-02-03 | End: 2021-02-09 | Stop reason: HOSPADM

## 2021-02-03 RX ORDER — MEMANTINE HYDROCHLORIDE 10 MG/1
10 TABLET ORAL
Status: DISCONTINUED | OUTPATIENT
Start: 2021-02-03 | End: 2021-02-09 | Stop reason: HOSPADM

## 2021-02-03 RX ORDER — DONEPEZIL HYDROCHLORIDE 5 MG/1
5 TABLET, FILM COATED ORAL
Status: DISCONTINUED | OUTPATIENT
Start: 2021-02-03 | End: 2021-02-09 | Stop reason: HOSPADM

## 2021-02-03 RX ORDER — LEVOTHYROXINE SODIUM 100 UG/1
100 TABLET ORAL
Status: DISCONTINUED | OUTPATIENT
Start: 2021-02-04 | End: 2021-02-09 | Stop reason: HOSPADM

## 2021-02-03 RX ADMIN — PIPERACILLIN AND TAZOBACTAM 3.38 G: 3; .375 INJECTION, POWDER, LYOPHILIZED, FOR SOLUTION INTRAVENOUS at 13:45

## 2021-02-03 RX ADMIN — HEPARIN SODIUM 5000 UNITS: 5000 INJECTION INTRAVENOUS; SUBCUTANEOUS at 16:45

## 2021-02-03 RX ADMIN — SODIUM CHLORIDE, POTASSIUM CHLORIDE, SODIUM LACTATE AND CALCIUM CHLORIDE 100 ML/HR: 600; 310; 30; 20 INJECTION, SOLUTION INTRAVENOUS at 16:36

## 2021-02-03 RX ADMIN — SODIUM CHLORIDE 1000 ML: 9 INJECTION, SOLUTION INTRAVENOUS at 11:12

## 2021-02-03 RX ADMIN — VANCOMYCIN HYDROCHLORIDE 1500 MG: 10 INJECTION, POWDER, LYOPHILIZED, FOR SOLUTION INTRAVENOUS at 14:24

## 2021-02-03 NOTE — H&P
[facility]  History and Physical    Primary Care Provider: Maxwell Sellers MD  Date of Service:  2/3/2021    Subjective:     Charley Guevara is a 66 y.o. female with PMH of progressive dementia, indwelling Joseph catheter presenting to ED from urology where she was found to be lethargic. Daughter states they were sitting in the waiting room for 75 minutes and then she noticed her mom got cool and clammy and her head leaned back and it looked like she was napping, but she couldn't wake her up. They called EMS and reportedly were unable to capture a BP. Pt then came to after a few minutes and remained a bit altered and sleepy for a while before coming back to her baseline. She has sigificantly declined over the last few weeks. She is very confused at baseline but is was more somnolent than usual.  She had an episode a few months ago where her eyes rolled back and her eyes were moving back and forth so she was started on keppra for possible seizures but daughter states that after a thorough workup they were told she did not have seizures and stopped the keppra (pt had some sort of allergic reaction to it anyway). Currently pt is mildly sleepy but easily arousable to voice and oriented to name and location. She denies any pain, NAD, no acute complaints. She is wondering why she is in the hospital. Decreased PO intake recently too.   Patient resides at Home with significant other and caregiver to help provide 24/7 care but they need more help and are interested in placement  Patient ambulates with max assist    Past Medical History:   Diagnosis Date    CAD (coronary artery disease)     high cholesterol    [facility] ([facility] [address]     Endocrine disease     thyroid    Heart failure ([facility] [address]     MI    Psychiatric disorder     anxiety, depression      Past Surgical History:   Procedure Laterality Date    HX GYN      2 c sections    HX HEENT      tonsilectomy     Prior to Admission medications Medication Sig Start Date End Date Taking? Authorizing Provider   memantine ER (Namenda XR) 21 mg capsule Take 21 mg by mouth daily. Provider, Historical   atorvastatin (LIPITOR) 20 mg tablet Take 1 Tab by mouth daily. 1/27/21   Andrew Miranda MD   trimethoprim-polymyxin b (POLYTRIM) ophthalmic solution Administer 1 Drop to both eyes four (4) times daily for 7 days. 1/27/21 2/3/21  Andrew Miranda MD   levETIRAcetam (KEPPRA) 500 mg tablet Take 1 Tab by mouth two (2) times a day. Patient not taking: Reported on 1/30/2021 11/27/20   Tam Parrish MD   aspirin 81 mg chewable tablet Take 1 Tab by mouth daily. 11/27/20   Tam Parrish MD   levothyroxine (SYNTHROID) 100 mcg tablet Take 100 mcg by mouth Daily (before breakfast). Provider, Historical   calcium-cholecalciferol, d3, 500 mg(1,250mg) -400 unit chew Take 2 Tabs by mouth daily. Provider, Historical   amLODIPine (NORVASC) 5 mg tablet Take 5 mg by mouth daily. Provider, Historical   donepezil (ARICEPT) 5 mg tablet Take 5 mg by mouth nightly. Provider, Historical   latanoprost (XALATAN) 0.005 % ophthalmic solution Administer 1 Drop to both eyes nightly. Provider, Historical   lisinopril (PRINIVIL, ZESTRIL) 20 mg tablet Take 20 mg by mouth daily.     Provider, Historical     Allergies   Allergen Reactions    Augmentin [Amoxicillin-Pot Clavulanate] Nausea Only     Abdominal pain    Belladonna Alkaloids Swelling     Swelling of tongue    Carbocaine [Mepivacaine] Other (comments)     Chest pain    Cortisone Other (comments)     depression    Decadron [Dexamethasone] Other (comments)     Altered mental status    Erythromycin Nausea Only    Meprobamate Swelling     tongue    Paxil [Paroxetine Hcl] Other (comments)     Headache, syncope    Vantin [Cefpodoxime] Unknown (comments)    Vibramycin Calcium Nausea Only    Zoloft [Sertraline] Other (comments)     Headache, syncope      Family History   Problem Relation Age of Onset    Hypertension Mother     Diabetes Mother     Emphysema Father     Asthma Daughter        Review of Systems:  A comprehensive review of systems was negative except for that written in the History of Present Illness. Objective:     Physical Exam:   General:          Sleepy but awake, cooperative, no distress, appears stated age  Head:    Essential head tremor, Normocephalic, atraumatic  Eyes:   Conjunctivae clear, no scleral icterus, EOMs intact  ENT:   Mucosa normal. No drainage or sinus tenderness  Neck:               Supple, symmetrical, trachea midline  Back:               Symmetric, ROM normal  Lungs:             Clear to auscultation bilaterally, symmetric expansion, no respiratory distress  Chest wall:      No tenderness or deformity  Heart:              Regular rate and rhythm, no audible murmur  Abdomen:        Soft, non-tender, non-distended  Extremities:     Extremities normal, atraumatic, no cyanosis, trace b/l LE edema  Skin:                Skin color, texture, turgor normal. No rashes or lesions  Neurologic:      CNII-XII grossly intact. A&Ox2. YAÑEZ    ECG:  normal EKG, normal sinus rhythm, unchanged from previous tracings     Data Review: All diagnostic labs and studies have been reviewed. Chest x-ray was negative for acute cardiopulmonary process    Assessment:   Active Problems:    * No active hospital problems.  *    Plan:   Syncopal episode, lethargy, generalized weakness, debility   Sounds likely vasovagal   CT head nothing acute   IVF   PT/OT, orthostats   Likely progressive dementia as well    SLP to see if safe for PO    Hypothermia, improving   F/u blood cxs   S/p vanc, zosyn in ED   CXR nothing acute    Alzheimer's dementia   Cont home meds   CM to assist with placement, too much for daughter and SO at home    Urinary retention s/p ordonez   Ordonez replaced in ED   UA clear   Cont ordonez    Hypothyroidism   Cont LT4   Check TSH    HTN, controlled   Cont home meds    DVT ppx: heparin  DNR, daughter is POA  Admit as inpt expected 2 MN    FUNCTIONAL STATUS PRIOR TO HOSPITALIZATION Ambulatory with Use of Assistive Devices    Signed By: Lacy Sood MD     February 3, 2021 2:14 PM

## 2021-02-03 NOTE — ED PROVIDER NOTES
EMERGENCY DEPARTMENT HISTORY AND PHYSICAL EXAM      Date: 2/3/2021  Patient Name: Magdi Toledo    History of Presenting Illness     Chief Complaint   Patient presents with    Lethargy     Pt sent from [place] urology because she was very lethargic         HPI: Magdi Toledo, 66 y.o. female with history of progressive dementia, indwelling Joseph catheter presenting to ED from urology where she was found to be lethargic. Apparently she has very confused at baseline but is currently more somnolent than usual.  This is confirmed on initial contact with patient but she is able to answer simple questions and follow commands. She shakes her head no when asked if she has any complaints. There are no other complaints, changes, or physical findings at this time. PCP: Rober Seay MD    No current facility-administered medications on file prior to encounter. Current Outpatient Medications on File Prior to Encounter   Medication Sig Dispense Refill    memantine ER (Namenda XR) 21 mg capsule Take 21 mg by mouth daily.  atorvastatin (LIPITOR) 20 mg tablet Take 1 Tab by mouth daily. 30 Tab 0    trimethoprim-polymyxin b (POLYTRIM) ophthalmic solution Administer 1 Drop to both eyes four (4) times daily for 7 days. 2 mL 0    aspirin 81 mg chewable tablet Take 1 Tab by mouth daily. 30 Tab 0    [DISCONTINUED] levETIRAcetam (KEPPRA) 500 mg tablet Take 1 Tab by mouth two (2) times a day. (Patient not taking: Reported on 1/30/2021) 60 Tab 0    levothyroxine (SYNTHROID) 100 mcg tablet Take 100 mcg by mouth Daily (before breakfast).  calcium-cholecalciferol, d3, 500 mg(1,250mg) -400 unit chew Take 2 Tabs by mouth daily.  amLODIPine (NORVASC) 5 mg tablet Take 5 mg by mouth daily.  donepezil (ARICEPT) 5 mg tablet Take 5 mg by mouth nightly.  latanoprost (XALATAN) 0.005 % ophthalmic solution Administer 1 Drop to both eyes nightly.       lisinopril (PRINIVIL, ZESTRIL) 20 mg tablet Take 20 mg by mouth daily. Past History     Past Medical History:  Past Medical History:   Diagnosis Date    CAD (coronary artery disease)     high cholesterol    Dementia (HCC)     Endocrine disease     thyroid    Heart failure (HCC)     MI    Psychiatric disorder     anxiety, depression       Past Surgical History:  Past Surgical History:   Procedure Laterality Date    HX GYN      2 c sections    HX HEENT      tonsilectomy       Family History:  Family History   Problem Relation Age of Onset    Hypertension Mother     Diabetes Mother     Emphysema Father     Asthma Daughter        Social History:  Social History     Tobacco Use    Smoking status: Current Every Day Smoker     Packs/day: 0.50     Years: 10.00     Pack years: 5.00    Smokeless tobacco: Former User   Substance Use Topics    Alcohol use: No    Drug use: No       Allergies: Allergies   Allergen Reactions    Augmentin [Amoxicillin-Pot Clavulanate] Nausea Only     Abdominal pain    Belladonna Alkaloids Swelling     Swelling of tongue    Carbocaine [Mepivacaine] Other (comments)     Chest pain    Cortisone Other (comments)     depression    Decadron [Dexamethasone] Other (comments)     Altered mental status    Erythromycin Nausea Only    Meprobamate Swelling     tongue    Paxil [Paroxetine Hcl] Other (comments)     Headache, syncope    Vantin [Cefpodoxime] Unknown (comments)    Vibramycin Calcium Nausea Only    Zoloft [Sertraline] Other (comments)     Headache, syncope         Review of Systems   Review of Systems   Unable to perform ROS: Mental status change       Physical Exam   Physical Exam  Vitals signs and nursing note reviewed. Constitutional:       Comments: Frail, somnolent but arouses to voice and follow commands   HENT:      Head: Normocephalic and atraumatic. Nose: Nose normal.      Mouth/Throat:      Mouth: Mucous membranes are moist.   Eyes:      Extraocular Movements: Extraocular movements intact.       Pupils: Pupils are equal, round, and reactive to light. Neck:      Musculoskeletal: Neck supple. Cardiovascular:      Rate and Rhythm: Normal rate and regular rhythm. Pulmonary:      Effort: Pulmonary effort is normal.   Abdominal:      Palpations: Abdomen is soft. Tenderness: There is no abdominal tenderness. Musculoskeletal:         General: No swelling. Skin:     General: Skin is warm and dry. Neurological:      General: No focal deficit present. Comments: No obvious CN deficits, MAEE         Diagnostic Study Results     Labs -     Recent Results (from the past 24 hour(s))   EKG, 12 LEAD, INITIAL    Collection Time: 02/03/21 10:45 AM   Result Value Ref Range    Ventricular Rate 83 BPM    Atrial Rate 83 BPM    P-R Interval 142 ms    QRS Duration 78 ms    Q-T Interval 386 ms    QTC Calculation (Bezet) 453 ms    Calculated P Axis 44 degrees    Calculated R Axis 7 degrees    Calculated T Axis 40 degrees    Diagnosis       Normal sinus rhythm  Normal ECG  When compared with ECG of 25-JAN-2021 19:01,  No significant change was found     CBC WITH AUTOMATED DIFF    Collection Time: 02/03/21 11:13 AM   Result Value Ref Range    WBC 9.2 3.6 - 11.0 K/uL    RBC 5.29 (H) 3.80 - 5.20 M/uL    HGB 15.2 11.5 - 16.0 g/dL    HCT 48.2 (H) 35.0 - 47.0 %    MCV 91.1 80.0 - 99.0 FL    MCH 28.7 26.0 - 34.0 PG    MCHC 31.5 30.0 - 36.5 g/dL    RDW 15.7 (H) 11.5 - 14.5 %    PLATELET 205 694 - 998 K/uL    MPV 12.2 8.9 - 12.9 FL    NRBC 0.0 0  WBC    ABSOLUTE NRBC 0.00 0.00 - 0.01 K/uL    NEUTROPHILS 77 (H) 32 - 75 %    LYMPHOCYTES 10 (L) 12 - 49 %    MONOCYTES 8 5 - 13 %    EOSINOPHILS 3 0 - 7 %    BASOPHILS 1 0 - 1 %    IMMATURE GRANULOCYTES 1 (H) 0.0 - 0.5 %    ABS. NEUTROPHILS 7.2 1.8 - 8.0 K/UL    ABS. LYMPHOCYTES 0.9 0.8 - 3.5 K/UL    ABS. MONOCYTES 0.8 0.0 - 1.0 K/UL    ABS. EOSINOPHILS 0.3 0.0 - 0.4 K/UL    ABS. BASOPHILS 0.1 0.0 - 0.1 K/UL    ABS. IMM.  GRANS. 0.1 (H) 0.00 - 0.04 K/UL    DF AUTOMATED METABOLIC PANEL, COMPREHENSIVE    Collection Time: 02/03/21 11:13 AM   Result Value Ref Range    Sodium 139 136 - 145 mmol/L    Potassium 3.7 3.5 - 5.1 mmol/L    Chloride 102 97 - 108 mmol/L    CO2 31 21 - 32 mmol/L    Anion gap 6 5 - 15 mmol/L    Glucose 110 (H) 65 - 100 mg/dL    BUN 12 6 - 20 MG/DL    Creatinine 1.34 (H) 0.55 - 1.02 MG/DL    BUN/Creatinine ratio 9 (L) 12 - 20      GFR est AA 46 (L) >60 ml/min/1.73m2    GFR est non-AA 38 (L) >60 ml/min/1.73m2    Calcium 9.2 8.5 - 10.1 MG/DL    Bilirubin, total 0.4 0.2 - 1.0 MG/DL    ALT (SGPT) 17 12 - 78 U/L    AST (SGOT) 11 (L) 15 - 37 U/L    Alk. phosphatase 102 45 - 117 U/L    Protein, total 6.6 6.4 - 8.2 g/dL    Albumin 3.0 (L) 3.5 - 5.0 g/dL    Globulin 3.6 2.0 - 4.0 g/dL    A-G Ratio 0.8 (L) 1.1 - 2.2     TROPONIN I    Collection Time: 02/03/21 11:13 AM   Result Value Ref Range    Troponin-I, Qt. <0.05 <0.05 ng/mL   POC LACTIC ACID    Collection Time: 02/03/21 11:17 AM   Result Value Ref Range    Lactic Acid (POC) 2.17 (HH) 0.40 - 2.00 mmol/L   POC LACTIC ACID    Collection Time: 02/03/21 12:54 PM   Result Value Ref Range    Lactic Acid (POC) 1.21 0.40 - 2.00 mmol/L   URINALYSIS W/ REFLEX CULTURE    Collection Time: 02/03/21 12:58 PM    Specimen: Urine   Result Value Ref Range    Color YELLOW/STRAW      Appearance CLEAR CLEAR      Specific gravity <1.005 1.003 - 1.030    pH (UA) 7.5 5.0 - 8.0      Protein Negative NEG mg/dL    Glucose Negative NEG mg/dL    Ketone Negative NEG mg/dL    Bilirubin Negative NEG      Blood Negative NEG      Urobilinogen 0.2 0.2 - 1.0 EU/dL    Nitrites Negative NEG      Leukocyte Esterase Negative NEG      WBC 0-4 0 - 4 /hpf    RBC 0-5 0 - 5 /hpf    Epithelial cells FEW FEW /lpf    Bacteria Negative NEG /hpf    UA:UC IF INDICATED CULTURE NOT INDICATED BY UA RESULT CNI      Hyaline cast 0-2 0 - 5 /lpf       Radiologic Studies -   CT HEAD WO CONT   Final Result   1. No evidence of acute intracranial abnormality.    2. Unchanged generalized parenchymal volume loss with disproportionate atrophy   of the bilateral hippocampi and mesial temporal lobes, suggestive of Alzheimer's   dementia. 3. 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 XR CHEST PORT   Final Result    impression: No acute changes. CT Results  (Last 48 hours)               02/03/21 1154  CT HEAD WO CONT Final result    Impression:  1. No evidence of acute intracranial abnormality. 2. Unchanged generalized parenchymal volume loss with disproportionate atrophy   of the bilateral hippocampi and mesial temporal lobes, suggestive of Alzheimer's   dementia. 3. 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 Narrative:  EXAM:  CT HEAD WO CONT       INDICATION:   lethargy, hx dementia       COMPARISON: CT head 1/24/2021. TECHNIQUE: Unenhanced CT of the head was performed using 5 mm images. Brain and   bone windows were generated. CT dose reduction was achieved through use of a   standardized protocol tailored for this examination and automatic exposure   control for dose modulation. FINDINGS:   Generalized parenchymal volume loss with commensurate dilation of the sulci and   ventricular system. Unchanged disproportionate volume loss of the bilateral   hippocampi and mesial temporal lobes. Unchanged confluent periventricular and   deep white matter hypodensities, consistent with severe chronic microangiopathic   ischemic changes. Unchanged small chronic infarcts in the bilateral thalami. Basilar cisterns are patent. No midline shift. There is no evidence of acute   infarct, hemorrhage, or extraaxial fluid collection. The paranasal sinuses, mastoid air cells, and middle ears are clear. The orbital   contents are within normal limits with bilateral lens implants.   There are no   significant osseous or extracranial soft tissue lesions. CXR Results  (Last 48 hours)               02/03/21 1103  XR CHEST PORT Final result    Impression:   impression: No acute changes. Narrative:  Clinical indication: Shortness of breath. Portable AP upright view of the chest obtained, comparison January 24, 2021. The  heart size is normal. There is no acute infiltrate. Medical Decision Making   I am the first provider for this patient. I reviewed the vital signs, available nursing notes, past medical history, past surgical history, family history and social history. Vital Signs-Reviewed the patient's vital signs. Patient Vitals for the past 24 hrs:   Temp Pulse Resp BP SpO2   02/03/21 1037 97.7 °F (36.5 °C) 84 16 (!) 140/54 95 %         Provider Notes (Medical Decision Making):   57-year-old presenting to ED with encephalopathy. Unclear etiology for this. Apparently she was admitted for same not too long ago. Initially she was hypothermic but this resolved relatively quickly. Work-up in the ED is nonfocal.  May be a prolonged postictal phase as patient has history of seizures but she is not having seizures in ED. She will require admission for further management. EKG sinus, rate 83, no CARLOTTA, no STD, normal QT    ED Course:     Initial assessment performed. The patients presenting problems have been discussed, and they are in agreement with the care plan formulated and outlined with them. I have encouraged them to ask questions as they arise throughout their visit. Disposition:  admit    PLAN:  1. Current Discharge Medication List        2.    Follow-up Information    None       Return to ED if worse     Diagnosis     Clinical Impression:encephalopathy

## 2021-02-03 NOTE — ED NOTES
Pt refusing dinner saying she is not hungry.  Pt did have ensure and some crackers with speech pathologist.

## 2021-02-03 NOTE — ED TRIAGE NOTES
Patient arrives to the emergency department via EMS with a chief complaint of lethargy from [facility] urology. Pt has dementia and is at baseline mentation per EMS. Pt arrives with ordonez catheter in place.  per EMS.

## 2021-02-03 NOTE — PROGRESS NOTES
SPEECH PATHOLOGY BEDSIDE SWALLOW EVALUATION/DISCHARGE  Patient: Richa Garcia (77 y.o. female)  Date: 2/3/2021  Primary Diagnosis: Syncope [R55]       Precautions:        ASSESSMENT :  Based on the objective data described below, the patient presents with no oral or pharyngeal dysphagia. After repeated encouragement to take PO from this writer and SLP, patient finally agreed. She was fully alert, eager to leave. She ate entire package of doug crackers, 4oz Ensure pudding and drank ~8oz water via successive straw sip. Timely mastication and functional swallow. Skilled acute therapy provided by a speech-language pathologist is not indicated at this time. PLAN :  Recommendations:  -- regular/thin liquids  - meds as tolerated    Discharge Recommendations: None     SUBJECTIVE:   Patient stated I want my clothes so I can leave. OBJECTIVE:     Past Medical History:   Diagnosis Date    CAD (coronary artery disease)     high cholesterol    Dementia (HCC)     Endocrine disease     thyroid    Heart failure (HCC)     MI    Psychiatric disorder     anxiety, depression     Past Surgical History:   Procedure Laterality Date    HX GYN      2 c sections    HX HEENT      tonsilectomy     Prior Level of Function/Home Situation:      Diet prior to admission: reg/thin   Current Diet:  Reg/thin    Cognitive and Communication Status:  Neurologic State: Alert  Orientation Level: Oriented to person, Disoriented to person, Disoriented to place, Disoriented to time  Cognition: Decreased command following           Oral Assessment:  Oral Assessment  Labial: No impairment  Dentition: Natural  Oral Hygiene: wfl  Lingual: No impairment  Velum: Unable to visualize  Mandible: No impairment  P.O. Trials:  Patient Position: (up in bed)  Vocal quality prior to P.O.: No impairment  Consistency Presented: Thin liquid; Solid;Pudding  How Presented: SLP-fed/presented;Straw;Successive swallows;Spoon     Bolus Acceptance: No impairment  Bolus Formation/Control: No impairment     Propulsion: No impairment  Oral Residue: None  Initiation of Swallow: No impairment  Laryngeal Elevation: Functional  Aspiration Signs/Symptoms: None  Pharyngeal Phase Characteristics: No impairment, issues, or problems   Effective Modifications: None  Cues for Modifications: None       Oral Phase Severity: No impairment  Pharyngeal Phase Severity : No impairment  NOMS:   The NOMS functional outcome measure was used to quantify this patient's level of swallowing impairment. Based on the NOMS, the patient was determined to be at level 7 for swallow function     NOMS Swallowing Levels:  Level 1 (CN): NPO  Level 2 (CM): NPO but takes consistency in therapy  Level 3 (CL): Takes less than 50% of nutrition p.o. and continues with nonoral feedings; and/or safe with mod cues; and/or max diet restriction  Level 4 (CK): Safe swallow but needs mod cues; and/or mod diet restriction; and/or still requires some nonoral feeding/supplements  Level 5 (CJ): Safe swallow with min diet restriction; and/or needs min cues  Level 6 (CI): Independent with p.o.; rare cues; usually self cues; may need to avoid some foods or needs extra time  Level 7 ([address] [place] [address]): Independent for all p.o.  BRYN. (2003). National Outcomes Measurement System (NOMS): Adult Speech-Language Pathology User's Guide. Pain:  Pain Scale 1: Numeric (0 - 10)  Pain Intensity 1: 0     After treatment:   Patient left in no apparent distress in bed, Call bell within reach and Nursing notified    COMMUNICATION/EDUCATION:     The patient's plan of care including recommendations, planned interventions, and recommended diet changes were discussed with: Registered nurse.      Thank you for this referral.  Bianca Bravo, JEOVANNY  Time Calculation: 20 mins

## 2021-02-04 ENCOUNTER — HOME CARE VISIT (OUTPATIENT)
Dept: HOME HEALTH SERVICES | Facility: HOME HEALTH | Age: 76
End: 2021-02-04
Payer: MEDICARE

## 2021-02-04 PROBLEM — R41.0 CONFUSION: Status: ACTIVE | Noted: 2021-02-04

## 2021-02-04 LAB
ANION GAP SERPL CALC-SCNC: 4 MMOL/L (ref 5–15)
BASOPHILS # BLD: 0.1 K/UL (ref 0–0.1)
BASOPHILS NFR BLD: 1 % (ref 0–1)
BUN SERPL-MCNC: 10 MG/DL (ref 6–20)
BUN/CREAT SERPL: 10 (ref 12–20)
CALCIUM SERPL-MCNC: 8.2 MG/DL (ref 8.5–10.1)
CHLORIDE SERPL-SCNC: 108 MMOL/L (ref 97–108)
CO2 SERPL-SCNC: 31 MMOL/L (ref 21–32)
CREAT SERPL-MCNC: 1 MG/DL (ref 0.55–1.02)
DIFFERENTIAL METHOD BLD: ABNORMAL
EOSINOPHIL # BLD: 0.3 K/UL (ref 0–0.4)
EOSINOPHIL NFR BLD: 3 % (ref 0–7)
ERYTHROCYTE [DISTWIDTH] IN BLOOD BY AUTOMATED COUNT: 15.5 % (ref 11.5–14.5)
GLUCOSE BLD STRIP.AUTO-MCNC: 95 MG/DL (ref 65–100)
GLUCOSE SERPL-MCNC: 78 MG/DL (ref 65–100)
HCT VFR BLD AUTO: 40.4 % (ref 35–47)
HGB BLD-MCNC: 12.9 G/DL (ref 11.5–16)
IMM GRANULOCYTES # BLD AUTO: 0 K/UL (ref 0–0.04)
IMM GRANULOCYTES NFR BLD AUTO: 1 % (ref 0–0.5)
LYMPHOCYTES # BLD: 1.4 K/UL (ref 0.8–3.5)
LYMPHOCYTES NFR BLD: 18 % (ref 12–49)
MAGNESIUM SERPL-MCNC: 2 MG/DL (ref 1.6–2.4)
MCH RBC QN AUTO: 29.2 PG (ref 26–34)
MCHC RBC AUTO-ENTMCNC: 31.9 G/DL (ref 30–36.5)
MCV RBC AUTO: 91.4 FL (ref 80–99)
MONOCYTES # BLD: 0.7 K/UL (ref 0–1)
MONOCYTES NFR BLD: 9 % (ref 5–13)
NEUTS SEG # BLD: 5.2 K/UL (ref 1.8–8)
NEUTS SEG NFR BLD: 68 % (ref 32–75)
NRBC # BLD: 0 K/UL (ref 0–0.01)
NRBC BLD-RTO: 0 PER 100 WBC
PHOSPHATE SERPL-MCNC: 3 MG/DL (ref 2.6–4.7)
PLATELET # BLD AUTO: 171 K/UL (ref 150–400)
PMV BLD AUTO: 12.5 FL (ref 8.9–12.9)
POTASSIUM SERPL-SCNC: 3.4 MMOL/L (ref 3.5–5.1)
RBC # BLD AUTO: 4.42 M/UL (ref 3.8–5.2)
SERVICE CMNT-IMP: NORMAL
SODIUM SERPL-SCNC: 143 MMOL/L (ref 136–145)
WBC # BLD AUTO: 7.6 K/UL (ref 3.6–11)

## 2021-02-04 PROCEDURE — 74011250636 HC RX REV CODE- 250/636: Performed by: INTERNAL MEDICINE

## 2021-02-04 PROCEDURE — 36415 COLL VENOUS BLD VENIPUNCTURE: CPT

## 2021-02-04 PROCEDURE — 3331090001 HH PPS REVENUE CREDIT

## 2021-02-04 PROCEDURE — 65390000012 HC CONDITION CODE 44 OBSERVATION

## 2021-02-04 PROCEDURE — 96372 THER/PROPH/DIAG INJ SC/IM: CPT

## 2021-02-04 PROCEDURE — 77010033678 HC OXYGEN DAILY

## 2021-02-04 PROCEDURE — 80048 BASIC METABOLIC PNL TOTAL CA: CPT

## 2021-02-04 PROCEDURE — 82962 GLUCOSE BLOOD TEST: CPT

## 2021-02-04 PROCEDURE — 74011250637 HC RX REV CODE- 250/637: Performed by: INTERNAL MEDICINE

## 2021-02-04 PROCEDURE — 83735 ASSAY OF MAGNESIUM: CPT

## 2021-02-04 PROCEDURE — 85025 COMPLETE CBC W/AUTO DIFF WBC: CPT

## 2021-02-04 PROCEDURE — 99218 HC RM OBSERVATION: CPT

## 2021-02-04 PROCEDURE — 3331090002 HH PPS REVENUE DEBIT

## 2021-02-04 PROCEDURE — 84100 ASSAY OF PHOSPHORUS: CPT

## 2021-02-04 RX ORDER — POTASSIUM CHLORIDE 750 MG/1
40 TABLET, FILM COATED, EXTENDED RELEASE ORAL ONCE
Status: DISPENSED | OUTPATIENT
Start: 2021-02-04 | End: 2021-02-04

## 2021-02-04 RX ORDER — HYDRALAZINE HYDROCHLORIDE 20 MG/ML
10 INJECTION INTRAMUSCULAR; INTRAVENOUS
Status: DISCONTINUED | OUTPATIENT
Start: 2021-02-04 | End: 2021-02-09 | Stop reason: HOSPADM

## 2021-02-04 RX ORDER — ACETAMINOPHEN 650 MG/1
325 SUPPOSITORY RECTAL
Status: DISCONTINUED | OUTPATIENT
Start: 2021-02-04 | End: 2021-02-09 | Stop reason: HOSPADM

## 2021-02-04 RX ORDER — ACETAMINOPHEN 325 MG/1
650 TABLET ORAL
Status: DISCONTINUED | OUTPATIENT
Start: 2021-02-04 | End: 2021-02-09 | Stop reason: HOSPADM

## 2021-02-04 RX ADMIN — SODIUM CHLORIDE, POTASSIUM CHLORIDE, SODIUM LACTATE AND CALCIUM CHLORIDE 100 ML/HR: 600; 310; 30; 20 INJECTION, SOLUTION INTRAVENOUS at 08:33

## 2021-02-04 RX ADMIN — HEPARIN SODIUM 5000 UNITS: 5000 INJECTION INTRAVENOUS; SUBCUTANEOUS at 07:40

## 2021-02-04 RX ADMIN — HEPARIN SODIUM 5000 UNITS: 5000 INJECTION INTRAVENOUS; SUBCUTANEOUS at 02:15

## 2021-02-04 RX ADMIN — HEPARIN SODIUM 5000 UNITS: 5000 INJECTION INTRAVENOUS; SUBCUTANEOUS at 15:27

## 2021-02-04 NOTE — ED NOTES
Bedside shift change report given to Josh Archer (oncoming nurse) by Spencer Schofield (offgoing nurse). Report included the following information SBAR, Kardex, ED Summary, [facility] and Recent Results.

## 2021-02-04 NOTE — PROGRESS NOTES
Occupational Therapy    Orders received chart reviewed, recent transfer from ED, patient currently on responsive to pain,BP elevated, and daughter concerned of changed in status. Will defer and follow up as able and as patient is appropriate for OT. (see RN note). Nicole Black.  MS Vianney OTR/L

## 2021-02-04 NOTE — PROGRESS NOTES
TRANSFER - OUT REPORT:    Verbal report given to Freddie Garcia (name) on Zandra Holland  being transferred to Oncology room 1120 for routine progression of care       Report consisted of patients Situation, Background, Assessment and   Recommendations(SBAR). Information from the following report(s) SBAR, Kardex, ED Summary, Procedure Summary, Intake/Output, MAR, Recent Results and Cardiac Rhythm BSR/SB was reviewed with the receiving nurse. Lines:   Peripheral IV 02/03/21 Posterior;Right Hand (Active)       Peripheral IV 02/03/21 Left Antecubital (Active)        Opportunity for questions and clarification was provided.       Patient transported with:   Registered Nurse

## 2021-02-04 NOTE — PROGRESS NOTES
0830- entered room. Patient awake and resting in bed. Assisted patient to take medication. Patient closed eyes and shut mouth.  Patient refusing medication at this time

## 2021-02-04 NOTE — PROGRESS NOTES
Physical Therapy    PT order received. Chart reviewed. Pt just arrived on IP floor from ED. Note in chart: \" Pt came to floor and is only responsive to sternal rub, her eyes respond to light, when you arnt talking to her her eyes are cracked but then she will close them tight, apparently she was doing this also in the ED but she would still answer question. She wont answer my questions and I asked her to squeeze my hands she did not. Her blood pressure is in the 170 but she would not take her meds neither do I feel safe trying to get her to swallow pills considering she wont even squeeze my hand. . Daughter is in the room and says she has never seen her like this before. \" Pt had been seen yesterday afternoon by SLP and was able eat crackers and pudding, alert. MD being notified. Will defer eval at this time and follow.     Nicholas Hopper, PT

## 2021-02-04 NOTE — PROGRESS NOTES
End of Shift Note    Bedside shift change report given to [facility]   (oncoming nurse) by Seb Ayon RN (offgoing nurse). Report included the following information SBAR, Kardex and MAR    Shift worked:  7a-7p     Shift summary and any significant changes:     Pt was received from ER, see progress note on how pt came to unit, pt has awakened and is talking more, hourly rounding done, pt stable, pt is not complaining of pain, ordonez care done, family was in room with pt for a few hours     Concerns for physician to address:       Zone phone for oncoming shift:          Activity:  Activity Level: Up with Assistance, Bed Rest  Number times ambulated in hallways past shift: 0  Number of times OOB to chair past shift: 0    Cardiac:   Cardiac Monitoring: No           Access:   Current line(s): PIV     Genitourinary:   Urinary status: voiding    Respiratory:   O2 Device: Room air  Chronic home O2 use?: NO  Incentive spirometer at bedside: NO     GI:     Current diet:  DIET FULL LIQUID  DIET ONE TIME MESSAGE  Passing flatus: YES  Tolerating current diet: YES       Pain Management:   Patient states pain is manageable on current regimen: YES    Skin:  Korey Score: 15  Interventions: turn team, float heels and increase time out of bed    Patient Safety:  Fall Score:  Total Score: 2  Interventions: bed/chair alarm, gripper socks and pt to call before getting OOB  High Fall Risk: Yes    Length of Stay:  Expected LOS: - - -  Actual LOS: 1      Seb Ayon RN

## 2021-02-04 NOTE — PROGRESS NOTES
Pt came to floor was only responding to pain, Called Ed nurse she said she was like that at the end and daughter said she would do this sometimes, I contacted Tala Covington the MD listed on her chart, she responded saying she was not in, contacted other MD   [address] [place] or Facility: [facility] From: Chari Gaucher RE: Julia Patricia 1945 RM: [id number] Pt came to floor and is only responsive to sternal rub, her eyes respond to light, when you arnt talking to her her eyes are cracked but then she will close them tight, apparently she was doing this also in the ED but she would still answer question. She wont answer my questions and I asked her to squeeze my hands she did not. Her blood pressure is in the 170 but she would not take her meds neither do I feel safe trying to get her to swallow pills considering she wont even squeeze my hand. . Daughter is in the room and says she has never seen her like this before. Need Callback: NEEDS CALLBACK ONCOLOGY NEW ADMISSION  Unread\"  Doctor called back, he said she responded in ED for him but thinks this is just a continuation from how she was, he is giving IV prn meds for when she can not swallow her meds  I held her morning meds, because she is not responding for me or following commands so I do not think she would be safe to swallow medicine.

## 2021-02-04 NOTE — ED NOTES
Bedside and Verbal shift change report given to Gwinda Mohs (oncoming nurse) by Maritza Schilder (offgoing nurse). Report included the following information SBAR, ED Summary, MAR and Recent Results.

## 2021-02-05 ENCOUNTER — HOSPICE ADMISSION (OUTPATIENT)
Dept: HOSPICE | Facility: HOSPICE | Age: 76
End: 2021-02-05

## 2021-02-05 LAB
ANION GAP SERPL CALC-SCNC: 6 MMOL/L (ref 5–15)
BASOPHILS # BLD: 0.1 K/UL (ref 0–0.1)
BASOPHILS NFR BLD: 1 % (ref 0–1)
BUN SERPL-MCNC: 11 MG/DL (ref 6–20)
BUN/CREAT SERPL: 13 (ref 12–20)
CALCIUM SERPL-MCNC: 8.8 MG/DL (ref 8.5–10.1)
CHLORIDE SERPL-SCNC: 107 MMOL/L (ref 97–108)
CO2 SERPL-SCNC: 29 MMOL/L (ref 21–32)
CREAT SERPL-MCNC: 0.87 MG/DL (ref 0.55–1.02)
DIFFERENTIAL METHOD BLD: ABNORMAL
EOSINOPHIL # BLD: 0.1 K/UL (ref 0–0.4)
EOSINOPHIL NFR BLD: 2 % (ref 0–7)
ERYTHROCYTE [DISTWIDTH] IN BLOOD BY AUTOMATED COUNT: 15.4 % (ref 11.5–14.5)
GLUCOSE SERPL-MCNC: 86 MG/DL (ref 65–100)
HCT VFR BLD AUTO: 44.6 % (ref 35–47)
HGB BLD-MCNC: 14.3 G/DL (ref 11.5–16)
IMM GRANULOCYTES # BLD AUTO: 0 K/UL (ref 0–0.04)
IMM GRANULOCYTES NFR BLD AUTO: 0 % (ref 0–0.5)
LYMPHOCYTES # BLD: 1.3 K/UL (ref 0.8–3.5)
LYMPHOCYTES NFR BLD: 16 % (ref 12–49)
MCH RBC QN AUTO: 28.7 PG (ref 26–34)
MCHC RBC AUTO-ENTMCNC: 32.1 G/DL (ref 30–36.5)
MCV RBC AUTO: 89.6 FL (ref 80–99)
MONOCYTES # BLD: 0.7 K/UL (ref 0–1)
MONOCYTES NFR BLD: 8 % (ref 5–13)
NEUTS SEG # BLD: 6.1 K/UL (ref 1.8–8)
NEUTS SEG NFR BLD: 73 % (ref 32–75)
NRBC # BLD: 0 K/UL (ref 0–0.01)
NRBC BLD-RTO: 0 PER 100 WBC
PLATELET # BLD AUTO: 200 K/UL (ref 150–400)
PMV BLD AUTO: 12.1 FL (ref 8.9–12.9)
POTASSIUM SERPL-SCNC: 3.2 MMOL/L (ref 3.5–5.1)
RBC # BLD AUTO: 4.98 M/UL (ref 3.8–5.2)
SARS-COV-2, COV2: NORMAL
SODIUM SERPL-SCNC: 142 MMOL/L (ref 136–145)
T4 FREE SERPL-MCNC: 1.6 NG/DL (ref 0.8–1.5)
WBC # BLD AUTO: 8.3 K/UL (ref 3.6–11)

## 2021-02-05 PROCEDURE — 80048 BASIC METABOLIC PNL TOTAL CA: CPT

## 2021-02-05 PROCEDURE — 74011250636 HC RX REV CODE- 250/636: Performed by: INTERNAL MEDICINE

## 2021-02-05 PROCEDURE — 3331090001 HH PPS REVENUE CREDIT

## 2021-02-05 PROCEDURE — 85025 COMPLETE CBC W/AUTO DIFF WBC: CPT

## 2021-02-05 PROCEDURE — 74011250637 HC RX REV CODE- 250/637: Performed by: INTERNAL MEDICINE

## 2021-02-05 PROCEDURE — 3331090002 HH PPS REVENUE DEBIT

## 2021-02-05 PROCEDURE — 97162 PT EVAL MOD COMPLEX 30 MIN: CPT

## 2021-02-05 PROCEDURE — 84439 ASSAY OF FREE THYROXINE: CPT

## 2021-02-05 PROCEDURE — 96372 THER/PROPH/DIAG INJ SC/IM: CPT

## 2021-02-05 PROCEDURE — 97165 OT EVAL LOW COMPLEX 30 MIN: CPT

## 2021-02-05 PROCEDURE — 99218 HC RM OBSERVATION: CPT

## 2021-02-05 PROCEDURE — 36415 COLL VENOUS BLD VENIPUNCTURE: CPT

## 2021-02-05 PROCEDURE — 94760 N-INVAS EAR/PLS OXIMETRY 1: CPT

## 2021-02-05 PROCEDURE — 96375 TX/PRO/DX INJ NEW DRUG ADDON: CPT

## 2021-02-05 PROCEDURE — 96376 TX/PRO/DX INJ SAME DRUG ADON: CPT

## 2021-02-05 PROCEDURE — U0005 INFEC AGEN DETEC AMPLI PROBE: HCPCS

## 2021-02-05 PROCEDURE — 97530 THERAPEUTIC ACTIVITIES: CPT

## 2021-02-05 PROCEDURE — 97535 SELF CARE MNGMENT TRAINING: CPT

## 2021-02-05 PROCEDURE — 94761 N-INVAS EAR/PLS OXIMETRY MLT: CPT

## 2021-02-05 RX ORDER — POTASSIUM CHLORIDE 7.45 MG/ML
10 INJECTION INTRAVENOUS
Status: COMPLETED | OUTPATIENT
Start: 2021-02-05 | End: 2021-02-05

## 2021-02-05 RX ADMIN — LISINOPRIL 20 MG: 20 TABLET ORAL at 09:23

## 2021-02-05 RX ADMIN — MEMANTINE HYDROCHLORIDE 10 MG: 10 TABLET ORAL at 23:48

## 2021-02-05 RX ADMIN — POTASSIUM CHLORIDE 10 MEQ: 10 INJECTION, SOLUTION INTRAVENOUS at 12:55

## 2021-02-05 RX ADMIN — AMLODIPINE BESYLATE 5 MG: 5 TABLET ORAL at 09:23

## 2021-02-05 RX ADMIN — HEPARIN SODIUM 5000 UNITS: 5000 INJECTION INTRAVENOUS; SUBCUTANEOUS at 01:21

## 2021-02-05 RX ADMIN — ASPIRIN 81 MG: 81 TABLET, CHEWABLE ORAL at 09:23

## 2021-02-05 RX ADMIN — HEPARIN SODIUM 5000 UNITS: 5000 INJECTION INTRAVENOUS; SUBCUTANEOUS at 15:34

## 2021-02-05 RX ADMIN — ATORVASTATIN CALCIUM 20 MG: 20 TABLET, FILM COATED ORAL at 23:48

## 2021-02-05 RX ADMIN — DONEPEZIL HYDROCHLORIDE 5 MG: 5 TABLET, FILM COATED ORAL at 23:48

## 2021-02-05 RX ADMIN — POTASSIUM CHLORIDE 10 MEQ: 10 INJECTION, SOLUTION INTRAVENOUS at 10:47

## 2021-02-05 RX ADMIN — HEPARIN SODIUM 5000 UNITS: 5000 INJECTION INTRAVENOUS; SUBCUTANEOUS at 07:37

## 2021-02-05 RX ADMIN — MEMANTINE HYDROCHLORIDE 5 MG: 10 TABLET ORAL at 09:24

## 2021-02-05 RX ADMIN — POTASSIUM CHLORIDE 10 MEQ: 10 INJECTION, SOLUTION INTRAVENOUS at 11:46

## 2021-02-05 NOTE — INTERDISCIPLINARY ROUNDS
Oncology Interdisciplinary rounds were held today to discuss patient plan of care and outcomes. The following members were present: Nursing, Physician, Case Management, Pharmacy, and PT/OT Actual Length of Stay: 1 Expected Length of Stay: - - - Plan            Discharge Info session with hospice. Date: TBA

## 2021-02-05 NOTE — HOME CARE
Patient opened to [facility] with [facility] certification ending 3/30/21. If patient status changes to inpatient during this hospitalization, patient will need resumption order for SN prior to discharge.   Edinson Richmond RN  [facility]

## 2021-02-05 NOTE — PROGRESS NOTES
Hospitalist Progress Note    NAME: Mauricio Lux   :  1945   MRN:  [id number]       Assessment / Plan:  Syncopal episode, lethargy, generalized weakness, debility  Alzheimer's dementia  Syncope  -Has indwelling Ordonez catheter and was awaiting evaluation in outpatient urology clinic and became less responsive, was referred to the ED  -Just discharged for treatment of UTI with altered mental status  -CT head negative  PT/OT, check orthostatics when patient appropriate to get out of bed  -Appreciate SLP consult, regular diet thin liquids when alert and appropriate  -Possible vagal episode    Urinary retention s/p ordonez  -Referred from urology as above  -Continue Ordonez catheter  -UA not impressive for UTI     Hypothyroidism  -TSH is elevated to 8.9  -will check FT4, likely needs to be repeated/followed in OP setting     HTN, controlled  Cont home meds     DVT ppx: heparin  DNR, daughter is POA  Admit as inpt expected 2 MN     Subjective:     Chief Complaint / Reason for Physician Visit  Poor historian, awakens to voice and able to verbalize specific hospital in which she is located. No acute distress     Discussed with RN events overnight. Review of Systems:  Symptom Y/N Comments  Symptom Y/N Comments   Fever/Chills    Chest Pain     Poor Appetite    Edema     Cough    Abdominal Pain     Sputum    Joint Pain     SOB/WETZEL    Pruritis/Rash     Nausea/vomit    Tolerating PT/OT     Diarrhea    Tolerating Diet     Constipation    Other       Could NOT obtain due to: ams     Objective:     VITALS:   Last 24hrs VS reviewed since prior progress note.  Most recent are:  Patient Vitals for the past 24 hrs:   Temp Pulse Resp BP SpO2   21 1930 98.2 °F (36.8 °C) 78 18 (!) 141/57 93 %   21 1533 97.8 °F (36.6 °C) 83 16 (!) 148/72 95 %   21 1105    (!) 163/68    21 0909 97.8 °F (36.6 °C) 82 16 (!) 170/86 94 %   21 0833  70  (!) 169/61    21 0730     94 %   21 0700 97.9 °F (36.6 °C) (!) 59 13 (!) 109/45 95 %   02/04/21 0630  (!) 58 12 (!) 164/69 96 %   02/04/21 0600  (!) 52 13 (!) 127/45 95 %   02/04/21 0530 97.7 °F (36.5 °C) (!) 56 16 (!) 169/84 96 %   02/04/21 0500  (!) 51 11 (!) 159/57 96 %   02/04/21 0430  (!) 51 11 (!) 156/55 97 %   02/04/21 0400  (!) 48 11 (!) 153/55 96 %   02/04/21 0330  74 17 (!) 166/61 95 %   02/04/21 0300  (!) 53 12 (!) 133/45 97 %   02/04/21 0230 97.9 °F (36.6 °C) (!) 48 11 (!) 146/48 96 %   02/04/21 0200  (!) 53 12 (!) 106/45 97 %   02/04/21 0130  (!) 51 14 (!) 158/69 95 %   02/04/21 0100  (!) 48 12 116/68 94 %   02/04/21 0000  86 17 (!) 151/71 97 %   02/03/21 2300  60 19 (!) 138/55 97 %   02/03/21 2230  84 21 (!) 158/57 97 %   02/03/21 2200  60 14 (!) 151/54 97 %       Intake/Output Summary (Last 24 hours) at 2/4/2021 2140  Last data filed at 2/4/2021 1910  Gross per 24 hour   Intake    Output 2100 ml   Net -2100 ml        I had a face to face encounter and independently examined this patient on 2/4/2021, as outlined below:  PHYSICAL EXAM:  General: WD, WN. Alert, cooperative, no acute distress    EENT:  EOMI. Anicteric sclerae. MMM  Resp:  CTA bilaterally, no wheezing or rales. No accessory muscle use  CV:  Regular  rhythm,  No edema  GI:  Soft, Non distended, Non tender. +Bowel sounds  Neurologic:  Alert and oriented X 3, normal speech,   Psych:   Good insight. Not anxious nor agitated  Skin:  No rashes.   No jaundice    Reviewed most current lab test results and cultures  YES  Reviewed most current radiology test results   YES  Review and summation of old records today    NO  Reviewed patient's current orders and MAR    YES  PMH/SH reviewed - no change compared to H&P  ________________________________________________________________________  Care Plan discussed with:    Comments   Patient x    Family      RN x    Care Manager     Consultant                        Multidiciplinary team rounds were held today with , nursing, pharmacist and clinical coordinator.  Patient's plan of care was discussed; medications were reviewed and discharge planning was addressed.     ________________________________________________________________________  Total NON critical care TIME:  35   Minutes    Total CRITICAL CARE TIME Spent:   Minutes non procedure based      Comments   >50% of visit spent in counseling and coordination of care     ________________________________________________________________________  Miller Álvarez DO     Procedures: see electronic medical records for all procedures/Xrays and details which were not copied into this note but were reviewed prior to creation of Plan.      LABS:  I reviewed today's most current labs and imaging studies.  Pertinent labs include:  Recent Labs     02/04/21  0314 02/03/21  1113   WBC 7.6 9.2   HGB 12.9 15.2   HCT 40.4 48.2*    199     Recent Labs     02/04/21  0314 02/03/21  1113    139   K 3.4* 3.7    102   CO2 31 31   GLU 78 110*   BUN 10 12   CREA 1.00 1.34*   CA 8.2* 9.2   MG 2.0  --    PHOS 3.0  --    ALB  --  3.0*   TBILI  --  0.4   ALT  --  17       Signed: Miller Álvarez DO

## 2021-02-05 NOTE — HOSPICE
[facility]  [phone]    Nursing Note   Patient Name: Rick Sher  YOB: 1945  Age: 66 y.o. Loc [facility] RN Note:  Hospice consult noted. Chart reviewed. Will reach out to family to set-up hospice information session. Thank you for the opportunity to be of service to this patient and her family. 1400:  Called Deyanira Goode/ashley/GALA via phone and left message on her  requesting return call. Looking forward to return call.

## 2021-02-05 NOTE — PROGRESS NOTES
Hospitalist Progress Note    NAME: Paula Romero   :  1945   MRN:  [id number]       Assessment / Plan:  Syncopal episode, lethargy, generalized weakness, debility  Alzheimer's dementia  Syncope, ?vasovagal vs absence spell  -Has indwelling Ordonez catheter and was awaiting evaluation in outpatient urology clinic and became less responsive, was referred to the ED  -Just discharged for treatment of UTI with altered mental status  -CT head negative  PT/OT, check orthostatics when patient appropriate to get out of bed  -Appreciate SLP consult, regular diet thin liquids when alert and appropriate  -seen at bedside with her son Terrie Flores this AM, more awake and recognized him    Urinary retention s/p ordonez  -Referred from urology as above  -Continue Ordonez catheter  -UA not impressive for UTI     Hypothyroidism  -TSH is elevated to 8.9  -FT4 in nL range. Cont current dose of synthroid     HTN, controlled  Cont home meds    Daughter, Augustine Swenson and son, Terrie Flores discussing options for possible placement vs transition home with hospice and caretaker or hospice at facility. Have consulted hospice for info session     DVT ppx: heparin  DNR, daughter is POA  Admit as inpt expected 2 MN     Subjective:     Chief Complaint / Reason for Physician Visit  Sleeping in her room. Awakens to voice, no distress     Discussed with RN events overnight. Review of Systems:  Symptom Y/N Comments  Symptom Y/N Comments   Fever/Chills    Chest Pain     Poor Appetite    Edema     Cough    Abdominal Pain     Sputum    Joint Pain     SOB/WETZEL    Pruritis/Rash     Nausea/vomit    Tolerating PT/OT     Diarrhea    Tolerating Diet     Constipation    Other       Could NOT obtain due to: ams     Objective:     VITALS:   Last 24hrs VS reviewed since prior progress note.  Most recent are:  Patient Vitals for the past 24 hrs:   Temp Pulse Resp BP SpO2   21 1006  95  (!) 165/93 95 %   21 0959  91  (!) 189/90 94 %   21 0801 97.9 °F (36.6 °C) 82 18 (!) 178/78 90 %   02/04/21 2300 98.2 °F (36.8 °C) 100 18 (!) 150/61 92 %   02/04/21 1930 98.2 °F (36.8 °C) 78 18 (!) 141/57 93 %   02/04/21 1533 97.8 °F (36.6 °C) 83 16 (!) 148/72 95 %   02/04/21 1105    (!) 163/68        Intake/Output Summary (Last 24 hours) at 2/5/2021 1025  Last data filed at 2/4/2021 1910  Gross per 24 hour   Intake    Output 600 ml   Net -600 ml        I had a face to face encounter and independently examined this patient on 2/5/2021, as outlined below:  PHYSICAL EXAM:  General: WD, WN. Alert, cooperative, no acute distress    EENT:  EOMI. Anicteric sclerae. MMM  Resp:  CTA bilaterally, no wheezing or rales. No accessory muscle use  CV:  Regular  rhythm,  No edema  GI:  Soft, Non distended, Non tender. +Bowel sounds  Neurologic:  Alert and oriented X 3, normal speech,   Psych:   Good insight. Not anxious nor agitated  Skin:  No rashes. No jaundice    Reviewed most current lab test results and cultures  YES  Reviewed most current radiology test results   YES  Review and summation of old records today    NO  Reviewed patient's current orders and MAR    YES  PMH/SH reviewed - no change compared to H&P  ________________________________________________________________________  Care Plan discussed with:    Comments   Patient x    Family      RN x    Care Manager     Consultant                        Multidiciplinary team rounds were held today with , nursing, pharmacist and clinical coordinator. Patient's plan of care was discussed; medications were reviewed and discharge planning was addressed.      ________________________________________________________________________  Total NON critical care TIME:  35   Minutes    Total CRITICAL CARE TIME Spent:   Minutes non procedure based      Comments   >50% of visit spent in counseling and coordination of care     ________________________________________________________________________  Jay Jay Hennessy DO Procedures: see electronic medical records for all procedures/Xrays and details which were not copied into this note but were reviewed prior to creation of Plan. LABS:  I reviewed today's most current labs and imaging studies.   Pertinent labs include:  Recent Labs     02/05/21 0122 02/04/21 0314 02/03/21  1113   WBC 8.3 7.6 9.2   HGB 14.3 12.9 15.2   HCT 44.6 40.4 48.2*    171 199     Recent Labs     02/05/21 0122 02/04/21 0314 02/03/21  1113    143 139   K 3.2* 3.4* 3.7    108 102   CO2 29 31 31   GLU 86 78 110*   BUN 11 10 12   CREA 0.87 1.00 1.34*   CA 8.8 8.2* 9.2   MG  --  2.0  --    PHOS  --  3.0  --    ALB  --   --  3.0*   TBILI  --   --  0.4   ALT  --   --  17       Signed: Geovani Álvarez, DO

## 2021-02-05 NOTE — PROGRESS NOTES
End of Shift Note    Bedside shift change report given to yves (oncoming nurse) by Modesto Lundborg, RN (offgoing nurse). Report included the following information SBAR, Kardex, ED Summary, MAR and Recent Results    Shift worked:  7027-5707     Shift summary and any significant changes:     Patient alert and talking throughout shift. Educated patient regarding all given meds and patient okay with taking pills whole after encouragement. Given IV potassium. No complaints of pain throughout shift. Hourly rounding completed. Floated heels and repositioned q2 hours. Covid test done and sent to lab. Concerns for physician to address:  none     Zone phone for oncoming shift:   [phone]       Activity:  Activity Level: Up with Assistance  Number times ambulated in hallways past shift: 0  Number of times OOB to chair past shift: 0    Cardiac:   Cardiac Monitoring: No           Access:   Current line(s): PIV     Genitourinary:   Urinary status: incontinent    Respiratory:   O2 Device: Room air  Chronic home O2 use?: YES  Incentive spirometer at bedside: YES     GI:     Current diet:  DIET FULL LIQUID  DIET ONE TIME MESSAGE  Passing flatus: YES  Tolerating current diet: NO       Pain Management:   Patient states pain is manageable on current regimen: YES    Skin:  Korey Score: 15  Interventions: turn team, float heels and increase time out of bed    Patient Safety:  Fall Score:  Total Score: 4  Interventions: bed/chair alarm, gripper socks and pt to call before getting OOB  High Fall Risk: Yes    Length of Stay:  Expected LOS: - - -  Actual LOS: [address], RN

## 2021-02-05 NOTE — PROGRESS NOTES
End of Shift Note    Bedside shift change report given to Antonio Dawson (oncoming nurse) by Brant Lacy (offgoing nurse). Report included the following information SBAR, Kardex and MAR    Shift worked:  [id number]     Shift summary and any significant changes:    No acute changes. Patient lethargic and slept throughout shift. Woke patient up to take her meds but she refused all oral medications. Concerns for physician to address:       Zone phone for oncoming shift:          Activity:  Activity Level: Up with Assistance, Bed Rest  Number times ambulated in hallways past shift: 0  Number of times OOB to chair past shift: 0    Cardiac:   Cardiac Monitoring: No           Access:   Current line(s): PIV     Genitourinary:   Urinary status: ordonez    Respiratory:   O2 Device: Room air  Chronic home O2 use?: NO  Incentive spirometer at bedside: NO     GI:     Current diet:  DIET FULL LIQUID  DIET ONE TIME MESSAGE  Passing flatus: YES  Tolerating current diet: YES       Pain Management:   Patient states pain is manageable on current regimen: N/A    Skin:  Korey Score: 15  Interventions: turn team and float heels    Patient Safety:  Fall Score:  Total Score: 4  Interventions: bed/chair alarm and gripper socks  High Fall Risk: Yes    Length of Stay:  Expected LOS: - - -  Actual LOS: 1      Brant Lacy

## 2021-02-05 NOTE — PROGRESS NOTES
Problem: Mobility Impaired (Adult and Pediatric)  Goal: *Acute Goals and Plan of Care (Insert Text)  Description: FUNCTIONAL STATUS PRIOR TO ADMISSION: Patient is a poor historian, however PLOF obtained from son, chart, and CM. Patient typically is able to ambulate without AD, however with progressive weakness over the past couple of weeks, requiring use of RW and increased assistance. Assistance provided from daughter for all ADLs. HOME SUPPORT PRIOR TO ADMISSION: The patient lived with daughter and SO who assists patient as needed. Physical Therapy Goals  Initiated 2/5/2021  1. Patient will move from supine to sit and sit to supine , scoot up and down, and roll side to side in bed with supervision/set-up within 7 day(s). 2.  Patient will transfer from bed to chair and chair to bed with minimal assistance/contact guard assist using the least restrictive device within 7 day(s). 3.  Patient will perform sit to stand with minimal assistance/contact guard assist within 7 day(s). 4.  Patient will ambulate with minimal assistance/contact guard assist for 25 feet with the least restrictive device within 7 day(s). 5.  Patient will ascend/descend 4 stairs with 1 handrail(s) with moderate assistance  within 7 day(s). Outcome: Not Met  PHYSICAL THERAPY EVALUATION  Patient: Magdi Toledo (56 y.o. female)  Date: 2/5/2021  Primary Diagnosis: Syncope [R55]  Confusion [R41.0]        Precautions:  Fall    ASSESSMENT  Based on the objective data described below, the patient presents with impaired functional mobility and decreased independence secondary to impaired sitting balance, generalized weakness, decreased ROM, increased confusion, increased drowsiness, and decreased activity tolerance following admission for syncope. Pt received supine in bed with son at bedside and agreeable to PT evaluation.  Patient alert and oriented x 1 (self) on arrival. Patient with fluctuations in levels of alertness, needing occasional verbal and tactile cueing to keep eyes open and respond to questions. Patient able to follow all simple, one-step commands. VS noted below for session. Patient reported that her head felt \"heavy\" in sitting, however unable to elaborate. Encouraged patient to attempt sit>stand, however patient expressed fear of falling and insistent on returning supine in bed. Pt was left supine in bed with all needs met, RN aware, and son present following therapy session. Recommend patient discharge to SNF rehab to address functional impairments as noted above as patient with decline from baseline mobility and is at an increased risk for falls. Vitals:      02/05/21 0959 02/05/21 1006   BP:   (!) 189/90 (!) 165/93   BP 1 Location:   Right upper arm Right upper arm   BP Patient Position:   Supine Sitting   Pulse:   91 95   Resp:       Temp:       SpO2:   94% 95%   Weight:       Height:          Current Level of Function Impacting Discharge (mobility/balance): mod A    Functional Outcome Measure: The patient scored 0/100 on the Barthel Index outcome measure which is indicative of 100% impairment. Other factors to consider for discharge: increased risk for falls; decline from baseline mobility; recent hospitalization; needs physical assistance x 1     Patient will benefit from skilled therapy intervention to address the above noted impairments. PLAN :  Recommendations and Planned Interventions: bed mobility training, transfer training, gait training, therapeutic exercises, patient and family training/education, and therapeutic activities      Frequency/Duration: Patient will be followed by physical therapy:  4 times a week to address goals.     Recommendation for discharge: (in order for the patient to meet his/her long term goals)  Therapy up to 5 days/week in SNF setting    This discharge recommendation:  Has been made in collaboration with the attending provider and/or case management    IF patient discharges home will need the following DME: to be determined (TBD)         SUBJECTIVE:   Patient stated Don't let me die.     OBJECTIVE DATA SUMMARY:   HISTORY:    Past Medical History:   Diagnosis Date    CAD (coronary artery disease)     high cholesterol    Dementia (HCC)     Endocrine disease     thyroid    Heart failure (HCC)     MI    Psychiatric disorder     anxiety, depression     Past Surgical History:   Procedure Laterality Date    HX GYN      2 c sections    HX HEENT      tonsilectomy       Personal factors and/or comorbidities impacting plan of care: HF; anxiety; dementia    Home Situation  Home Environment: Private residence  # Steps to Enter: 4  Rails to Enter: Yes  Hand Rails : Bilateral(too wide)  One/Two Story Residence: One story  Living Alone: No  Support Systems: Family member(s), Spouse/Significant Other/Partner  Patient Expects to be Discharged to[de-identified] Unknown    EXAMINATION/PRESENTATION/DECISION MAKING:   Critical Behavior:  Neurologic State: Alert, Drowsy  Orientation Level: Disoriented to situation, Disoriented to time, Disoriented to place, Oriented to person  Cognition: Follows commands, Memory loss     Hearing: Auditory  Auditory Impairment: None  Skin:  Intact  Edema: None  Range Of Motion:  AROM: Generally decreased, functional                       Strength:    Strength: Generally decreased, functional                    Tone & Sensation:   Tone: Normal                              Coordination:  Coordination: Generally decreased, functional  Vision:      Functional Mobility:  Bed Mobility:     Supine to Sit: Moderate assistance; Additional time;Bed Modified(HOB elevated)  Sit to Supine:  Moderate assistance  Scooting: Maximum assistance  Transfers:  Sit to Stand: (Not assessed; patient declined OOB mobility)                          Balance:   Sitting: Impaired  Sitting - Static: Good (unsupported)  Sitting - Dynamic: Not tested  Ambulation/Gait Training:          Functional Measure:  Barthel Index:    Bathin  Bladder: 0  Bowels: 0  Groomin  Dressin  Feedin  Mobility: 0  Stairs: 0  Toilet Use: 0  Transfer (Bed to Chair and Back): 0  Total: 0/100       The Barthel ADL Index: Guidelines  1. The index should be used as a record of what a patient does, not as a record of what a patient could do. 2. The main aim is to establish degree of independence from any help, physical or verbal, however minor and for whatever reason. 3. The need for supervision renders the patient not independent. 4. A patient's performance should be established using the best available evidence. Asking the patient, friends/relatives and nurses are the usual sources, but direct observation and common sense are also important. However direct testing is not needed. 5. Usually the patient's performance over the preceding 24-48 hours is important, but occasionally longer periods will be relevant. 6. Middle categories imply that the patient supplies over 50 per cent of the effort. 7. Use of aids to be independent is allowed. Jayson Winn, Barthel, DBertWBert (1488). Functional evaluation: the Barthel Index. [facility] (14)2. OKSANA Ordonez SeF, Josr Alanis., Mary Roy., [place], [address] [place] [address] (). Measuring the change indisability after inpatient rehabilitation; comparison of the responsiveness of the Barthel Index and Functional [place] Measure. Journal of Neurology, Neurosurgery, and Psychiatry, 66(4), 887-069. Aston Bloom, N.J.A, ARNULFO Norton, & Gardenia Sheldon M.A. (2004.) Assessment of post-stroke quality of life in cost-effectiveness studies: The usefulness of the Barthel Index and the EuroQoL-5D.  Quality of Life Research, 15, 564-73           Physical Therapy Evaluation Charge Determination   History Examination Presentation Decision-Making   HIGH Complexity :3+ comorbidities / personal factors will impact the outcome/ POC  HIGH Complexity : 4+ Standardized tests and measures addressing body structure, function, activity limitation and / or participation in recreation  MEDIUM Complexity : Evolving with changing characteristics  Other outcome measures Barthel Index  HIGH       Based on the above components, the patient evaluation is determined to be of the following complexity level: MEDIUM    Pain Rating:  No pain complaints    Activity Tolerance:   Poor, requires frequent rest breaks, and signs and symptoms of orthostatic hypotension    After treatment patient left in no apparent distress:   Supine in bed, Heels elevated for pressure relief, Call bell within reach, Caregiver / family present, and Side rails x 3    COMMUNICATION/EDUCATION:   The patients plan of care was discussed with: Physical therapist, Occupational therapist, Registered nurse, and Case management. Fall prevention education was provided and the patient/caregiver indicated understanding., Patient/family have participated as able in goal setting and plan of care. , and Patient/family agree to work toward stated goals and plan of care.     Thank you for this referral.  Lyudmila Lepe, PT, DPT   Time Calculation: 27 mins

## 2021-02-05 NOTE — PROGRESS NOTES
ALONDRA Plan:  Home with hospice vs placement [facility])   >MD to discuss hospice with daughter today  >consult hospice if appropriate   [facility] medical transport at d/c   Acknowledged observation status, Code 44 delivered  CM to secure PCP follow up prior to d/c     Update 2:42 PM   [facility] has [facility] upon discharge. CM has spoken with Eri Renteria at [facility] regarding admission process. Per Liset, the hospitalist will need to complete a history and physical and pt will require a rapid covid test prior to admission. Liset has emailed a copy to CM. CM printed copy of H&P which was placed into hard chart and CM has alerted hospitalist to complete when able via [facility]. Hospitalist to order rapid covid test today. CM has contacted pt's daughter, Tomasa Heller, to provide the above update. She verbalized understanding. Weekend CM will be available to coordinate d/c if [facility]'s H&P is completed and rapid test results. Weekend CM will need to fax documentation to Eri Renteria at [phone]. [phone] to confirm d/c plan to [facility]. Pt will likely required medical transport at time of d/c. Will continue to follow. Reason for Readmission:  Syncope, confusion          RUR Score/Risk Level: 13%    PCP: First and Last name: Kaylie Mar    Name of Practice:    Are you a current patient: Yes/No: yes   Approximate date of last visit: 1/28/2021   Can you participate in a virtual visit with your PCP: yes with family assist     Is a Care Conference indicated:  No    Did you attend your follow up appointment (s): If not, why not:  Daughter reports pt was due for an appointment today but hospitalized. Last virtual visit was reportedly 1/28/2021. Resources/supports as identified by patient/family:  Pt has supportive daughter Freddie Frazier) and has a partner of 30yrs. Pt currently open to [facility].         Top Challenges facing patient (as identified by patient/family and CM): Not enough caregiver assistance at home, care needs exceed what family can provide at this time. Finances/Medication cost?  Has insurance coverage, no financial concerns voiced. Pt is not eligible for Medicaid. Transportation   Daughter has been providing transport but states she can no longer transport pt by herself  Support system or lack thereof? Daughter, significant other, Ember Christopher  Living arrangements? Lives with partner in a one level home with 3 steps to enter; daughter comes to house daily to assist pt with her care needs. Self-care/ADLs/Cognition? Up until 3 weeks ago, pt was able to walk on her own and feed herself. Was needing assistance with all ADLs. Pt has progressive dementia. Current Advanced Directive/Advance Care Plan:    Advance Care Planning     General Advance Care Planning (ACP) Conversation    Date of Conversation: 2/5/2021  Conducted with: Healthcare Decision Maker: Named in Advance Directive or Healthcare Power of [address]:     Primary Decision Maker: [facility] - daughter - [phone]  Click here to complete [place] including selection of the Healthcare Decision Maker Relationship (ie \"Primary\")  Today we documented Decision Maker(s) consistent with ACP documents on file. Content/Action Overview: Has ACP document(s) on file - reflects the patient's care preferences  Reviewed DNR/DNI and patient confirms current DNR status - completed forms on file (place new order if needed)  Topics discussed: hospice care    Length of Voluntary ACP Conversation in minutes:  16 minutes    Fatou[address] for utilizing home health:  Currently open to [facility] pending discussion with hospitalist concerning hospice care. Transition of Care Plan:    Based on readmission, the patient's previous Plan of Care   has been evaluated and/or modified.  The current Transition of Care Plan is:  TBD: home with hospice versus placement at [facility] which is a private care facility. Chart reviewed. Consult received for placement. CM contacted pt's daughter, Marya Spangler, by phone today to complete initial assessment. Pt with progressive dementia and unable to participate in assessment at this time. Priyank Mcfarlane verified contact information and demographics for pt. Pt resides with her partner of 30 years in a one level home with 3 steps to enter. Daughter is nearby and goes to pt's home daily to provide assistance. Pt had recent hospitalization and was discharged home with [facility]. Pt currently open to [facility]. Prior to 3 weeks ago, pt was able to walk on her own and feed herself. Pt has been needing assistance with all ADLs and family provides transportation. Pt tried using a RW the few days prior to admission, but daughter reports it did not go well. Daughter has been considering options for additional help at home including hiring private duty caregivers vs placement at [facility] which is a private duty care facility. Daughter is now thinking that pt may be at the point where they need to consider hospice. CM has alerted hospitalist to contact daughter to discuss goals of care and to identify if pt is hospice appropriate. If so, CM will consult [facility] for information session and evaluation. Daughter says that if pt is ready for hospice, she would prefer pt to return home with hospice. If not, she will continue to pursue placement at [facility] and identify what level of care they can provide pt. Pt will require medical transport at time of d/c. Preferred pharmacy is [facility]. Pt known to be active with PCP, Dr. Mera Rubin, with last visit at end of January. ACP on file and is up to date. CM will continue to follow for d/c planning needs.     Virtual reviewer communicated change from inpatient to observation to CM, which reflect outpatient observation order written prior to Written discharge order. Code 44 delivered to patient. CM discussed with the patient's daughter Rosy Angeles) and provided to the patient the printed information about her outpatient observation status. The patient was given the flyer entitled, \"Medicare Outpatient Observation: Information & notification. \" All questions were answered, no additional discharge needs identified at this time. Care Management Interventions  PCP Verified by CM: Yes(Dr. Ivy Ibrahim )  Last Visit to PCP: 01/28/21  Palliative Care Criteria Met (RRAT>21 & CHF Dx)?: No  Mode of Transport at Discharge: BLS([facility])  Transition of Care Consult (CM Consult): Discharge Planning  Discharge Durable Medical Equipment: No  Physical Therapy Consult: Yes  Occupational Therapy Consult: Yes  Speech Therapy Consult: Yes  Current Support Network: Own Home, Lives with Spouse, Family Lives Nearby(lives with partner of 30 yrs.  Daughter goes there daily to assist pt with her needs)  Confirm Follow Up Transport: [facility]. VIVEK Sheppard Information Provided?: No  Discharge Location  Discharge Placement: Other:(TBD- home with hospice vs placement )    Readmission Assessment  Number of days since last admission?: 1-7 days  Previous disposition: Home with Home Health(and family support)  Who is being interviewed?: Caregiver  What was the patient's/caregiver's perception as to why they think they needed to return back to the hospital?: Did not realize care needs would be so extensive, Not enough help at home  Did you visit your Primary Care Physician after you left the hospital, before you returned this time?: Yes  Did you see a specialist, such as Cardiac, Pulmonary, Orthopedic Physician, etc. after you left the hospital?: No  Who advised the patient to return to the hospital?: Caregiver  Does the patient report anything that got in the way of taking their medications?: No  In our efforts to provide the best possible care to you and others like you, can you think of anything that we could have done to help you after you left the hospital the first time, so that you might not have needed to return so soon?: Arrange for more help when leaving the hospital     Kayli Elizondo, [address], [facility]  [phone]

## 2021-02-06 LAB
ANION GAP SERPL CALC-SCNC: 11 MMOL/L (ref 5–15)
BUN SERPL-MCNC: 11 MG/DL (ref 6–20)
BUN/CREAT SERPL: 12 (ref 12–20)
CALCIUM SERPL-MCNC: 9.1 MG/DL (ref 8.5–10.1)
CHLORIDE SERPL-SCNC: 104 MMOL/L (ref 97–108)
CO2 SERPL-SCNC: 25 MMOL/L (ref 21–32)
CREAT SERPL-MCNC: 0.93 MG/DL (ref 0.55–1.02)
GLUCOSE SERPL-MCNC: 74 MG/DL (ref 65–100)
POTASSIUM SERPL-SCNC: 3.2 MMOL/L (ref 3.5–5.1)
SARS-COV-2, COV2: NOT DETECTED
SODIUM SERPL-SCNC: 140 MMOL/L (ref 136–145)
SPECIMEN SOURCE, FCOV2M: NORMAL

## 2021-02-06 PROCEDURE — 94760 N-INVAS EAR/PLS OXIMETRY 1: CPT

## 2021-02-06 PROCEDURE — 74011000250 HC RX REV CODE- 250: Performed by: INTERNAL MEDICINE

## 2021-02-06 PROCEDURE — 3331090001 HH PPS REVENUE CREDIT

## 2021-02-06 PROCEDURE — 74011250636 HC RX REV CODE- 250/636: Performed by: INTERNAL MEDICINE

## 2021-02-06 PROCEDURE — 80048 BASIC METABOLIC PNL TOTAL CA: CPT

## 2021-02-06 PROCEDURE — 96372 THER/PROPH/DIAG INJ SC/IM: CPT

## 2021-02-06 PROCEDURE — 3331090002 HH PPS REVENUE DEBIT

## 2021-02-06 PROCEDURE — 36415 COLL VENOUS BLD VENIPUNCTURE: CPT

## 2021-02-06 PROCEDURE — 96375 TX/PRO/DX INJ NEW DRUG ADDON: CPT

## 2021-02-06 PROCEDURE — 99218 HC RM OBSERVATION: CPT

## 2021-02-06 PROCEDURE — 96376 TX/PRO/DX INJ SAME DRUG ADON: CPT

## 2021-02-06 PROCEDURE — 74011250637 HC RX REV CODE- 250/637: Performed by: INTERNAL MEDICINE

## 2021-02-06 RX ORDER — LABETALOL HYDROCHLORIDE 5 MG/ML
20 INJECTION, SOLUTION INTRAVENOUS EVERY 6 HOURS
Status: DISCONTINUED | OUTPATIENT
Start: 2021-02-06 | End: 2021-02-09 | Stop reason: HOSPADM

## 2021-02-06 RX ADMIN — LEVOTHYROXINE SODIUM 100 MCG: 0.1 TABLET ORAL at 09:59

## 2021-02-06 RX ADMIN — HEPARIN SODIUM 5000 UNITS: 5000 INJECTION INTRAVENOUS; SUBCUTANEOUS at 10:00

## 2021-02-06 RX ADMIN — LABETALOL HYDROCHLORIDE 20 MG: 5 INJECTION INTRAVENOUS at 12:01

## 2021-02-06 RX ADMIN — LABETALOL HYDROCHLORIDE 20 MG: 5 INJECTION INTRAVENOUS at 18:45

## 2021-02-06 RX ADMIN — LISINOPRIL 20 MG: 20 TABLET ORAL at 09:59

## 2021-02-06 RX ADMIN — ASPIRIN 81 MG: 81 TABLET, CHEWABLE ORAL at 09:59

## 2021-02-06 RX ADMIN — MEMANTINE HYDROCHLORIDE 5 MG: 10 TABLET ORAL at 09:59

## 2021-02-06 RX ADMIN — HEPARIN SODIUM 5000 UNITS: 5000 INJECTION INTRAVENOUS; SUBCUTANEOUS at 16:33

## 2021-02-06 RX ADMIN — AMLODIPINE BESYLATE 5 MG: 5 TABLET ORAL at 09:59

## 2021-02-06 NOTE — PROGRESS NOTES
End of Shift Note    Bedside shift change report given to Jevon Peralta (oncoming nurse) by Nesha Kingston RN (offgoing nurse). Report included the following information SBAR, Kardex and MAR    Shift worked:  7a-7p     Shift summary and any significant changes:     Pt stable though shift, meds given according to mar, hourly rounding done, ordonez care done, scheduled labeltalol given nurse monitoring, pt connected to tele     Concerns for physician to address:       Zone phone for oncoming shift:          Activity:  Activity Level: Up with Assistance  Number times ambulated in hallways past shift: 0  Number of times OOB to chair past shift: 0    Cardiac:   Cardiac Monitoring: Yes           Access:   Current line(s): PIV     Genitourinary:   Urinary status: voiding    Respiratory:   O2 Device: Room air  Chronic home O2 use?: NO  Incentive spirometer at bedside: NO     GI:     Current diet:  DIET FULL LIQUID  DIET ONE TIME MESSAGE  Passing flatus: YES  Tolerating current diet: YES       Pain Management:   Patient states pain is manageable on current regimen: YES    Skin:  Korey Score: 15  Interventions: turn team, increase time out of bed and internal/external urinary devices    Patient Safety:  Fall Score:  Total Score: 4  Interventions: bed/chair alarm, gripper socks and pt to call before getting OOB  High Fall Risk: Yes    Length of Stay:  Expected LOS: - - -  Actual LOS: Joe Spencer RN

## 2021-02-06 NOTE — PROGRESS NOTES
Hospitalist Progress Note    NAME: Naresh Carroll   :  1945   MRN:  [id number]       Assessment / Plan:  Syncopal episode, lethargy, generalized weakness, debility  Alzheimer's dementia  Syncope, ?vasovagal vs absence spell  -brief unreponsiveness waiting room  office day of admission  -seen by SLP, ok for regular diet, thin liquids  -PT/OT eval pending  -recent admission for UTI/AMS    CT head (no acute findings)  1. No evidence of acute intracranial abnormality. 2. Unchanged generalized parenchymal volume loss with disproportionate atrophy  of the bilateral hippocampi and mesial temporal lobes, suggestive of Alzheimer's  dementia. 3. 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 Urinary retention s/p ordonez  -Referred from urology as above  -Continue Ordonez catheter  -UA not impressive for UTI    Hx of Rt temporal lobe CVA  -asa, statin     Hypothyroidism  -TSH is elevated to 8.9  -FT4 in nL range. Cont current dose of synthroid     HTN, controlled  Cont home meds    Daughter, Sudhakar Milks and son, [place] D[place] discussing options for possible placement vs transition home with hospice and caretaker or hospice at facility     DVT ppx: heparin  DNR, daughter is POA  Admit as inpt expected 2 MN     Subjective:     Chief Complaint / Reason for Physician Visit  No distress. Laying in bed. Confused but verbal, denies pain. No distress     Discussed with RN events overnight. Review of Systems:  Symptom Y/N Comments  Symptom Y/N Comments   Fever/Chills    Chest Pain     Poor Appetite    Edema     Cough    Abdominal Pain     Sputum    Joint Pain     SOB/WETZEL    Pruritis/Rash     Nausea/vomit    Tolerating PT/OT     Diarrhea    Tolerating Diet     Constipation    Other       Could NOT obtain due to: ams     Objective:     VITALS:   Last 24hrs VS reviewed since prior progress note.  Most recent are:  Patient Vitals for the past 24 hrs:   Temp Pulse Resp BP SpO2   02/06/21 0811 98.1 °F (36.7 °C) (!) 102 18 (!) 170/88 94 %   02/05/21 2320 98.2 °F (36.8 °C) 86 18 (!) 150/76 96 %   02/05/21 1945 98.8 °F (37.1 °C) 86 18 (!) 176/79 98 %   02/05/21 1653 98.3 °F (36.8 °C) 83 18 (!) 153/76 93 %   02/05/21 1150  100  (!) 154/83 93 %   02/05/21 1125  90  (!) 174/96 94 %   02/05/21 1110  76  (!) 192/83 97 %   02/05/21 1006  95  (!) 165/93 95 %   02/05/21 0959  91  (!) 189/90 94 %       Intake/Output Summary (Last 24 hours) at 2/6/2021 0917  Last data filed at 2/5/2021 1657  Gross per 24 hour   Intake    Output 475 ml   Net -475 ml        I had a face to face encounter and independently examined this patient on 2/6/2021, as outlined below:  PHYSICAL EXAM:  General: WD, WN. Alert, cooperative, no acute distress    EENT:  EOMI. Anicteric sclerae. MMM  Resp:  CTA bilaterally, no wheezing or rales. No accessory muscle use  CV:  Regular  rhythm,  No edema  GI:  Soft, Non distended, Non tender. +Bowel sounds  Neurologic:  Alert and oriented X 3, normal speech,   Psych:   Good insight. Not anxious nor agitated  Skin:  No rashes. No jaundice    Reviewed most current lab test results and cultures  YES  Reviewed most current radiology test results   YES  Review and summation of old records today    NO  Reviewed patient's current orders and MAR    YES  PMH/SH reviewed - no change compared to H&P  ________________________________________________________________________  Care Plan discussed with:    Comments   Patient x    Family      RN x    Care Manager     Consultant                        Multidiciplinary team rounds were held today with , nursing, pharmacist and clinical coordinator. Patient's plan of care was discussed; medications were reviewed and discharge planning was addressed.      ________________________________________________________________________  Total NON critical care TIME:  35   Minutes    Total CRITICAL CARE TIME Spent:   Minutes non procedure based      Comments   >50% of visit spent in counseling and coordination of care     ________________________________________________________________________  Erwin Bermudez,      Procedures: see electronic medical records for all procedures/Xrays and details which were not copied into this note but were reviewed prior to creation of Plan. LABS:  I reviewed today's most current labs and imaging studies.   Pertinent labs include:  Recent Labs     02/05/21  0122 02/04/21 0314 02/03/21  1113   WBC 8.3 7.6 9.2   HGB 14.3 12.9 15.2   HCT 44.6 40.4 48.2*    171 199     Recent Labs     02/06/21  0510 02/05/21  0122 02/04/21 0314 02/03/21  1113    142 143 139   K 3.2* 3.2* 3.4* 3.7    107 108 102   CO2 25 29 31 31   GLU 74 86 78 110*   BUN 11 11 10 12   CREA 0.93 0.87 1.00 1.34*   CA 9.1 8.8 8.2* 9.2   MG  --   --  2.0  --    PHOS  --   --  3.0  --    ALB  --   --   --  3.0*   TBILI  --   --   --  0.4   ALT  --   --   --  17       Signed: Hernandez Álvarez, DO

## 2021-02-06 NOTE — HOSPICE
[facility]  [phone]    Nursing Note   Patient Name: Zandra Holland  YOB: 1945  Age: 66 y.o. [facility] RN Note:      Spoke with Franklin Goode/ashley/FAIZAN via phone. Family has decided not to pursue hospice at this time. Family has our contact information if anything changes.

## 2021-02-06 NOTE — PROGRESS NOTES
Oncology End of Shift Note      Bedside shift change report given to 2000 Kayla Clemons (incoming nurse) by Saira Shaikh (outgoing nurse) on [facility]. Report included the following information SBAR, Kardex, Intake/Output and MAR. Shift Summary: pt refused parts of the assessment and some medications, she just wanted to be left alone    Issues for Physician to Address:       Patient on Cardiac Monitoring?     [] Yes  [x] No    Rhythm:      Korey Scale:     Korey Score: 15        If Korey Scale less than 15   Patient Mobility Ordered  No  Speciality Bed Ordered   no    Boots Applied                  Refused       Shift Events        Saira Shaikh

## 2021-02-07 LAB
ANION GAP SERPL CALC-SCNC: 8 MMOL/L (ref 5–15)
BUN SERPL-MCNC: 14 MG/DL (ref 6–20)
BUN/CREAT SERPL: 15 (ref 12–20)
CALCIUM SERPL-MCNC: 8.8 MG/DL (ref 8.5–10.1)
CHLORIDE SERPL-SCNC: 104 MMOL/L (ref 97–108)
CO2 SERPL-SCNC: 28 MMOL/L (ref 21–32)
CREAT SERPL-MCNC: 0.93 MG/DL (ref 0.55–1.02)
GLUCOSE SERPL-MCNC: 93 MG/DL (ref 65–100)
POTASSIUM SERPL-SCNC: 3.5 MMOL/L (ref 3.5–5.1)
SODIUM SERPL-SCNC: 140 MMOL/L (ref 136–145)

## 2021-02-07 PROCEDURE — 74011250637 HC RX REV CODE- 250/637: Performed by: INTERNAL MEDICINE

## 2021-02-07 PROCEDURE — 96372 THER/PROPH/DIAG INJ SC/IM: CPT

## 2021-02-07 PROCEDURE — 94761 N-INVAS EAR/PLS OXIMETRY MLT: CPT

## 2021-02-07 PROCEDURE — 3331090001 HH PPS REVENUE CREDIT

## 2021-02-07 PROCEDURE — 3331090002 HH PPS REVENUE DEBIT

## 2021-02-07 PROCEDURE — 74011250636 HC RX REV CODE- 250/636: Performed by: INTERNAL MEDICINE

## 2021-02-07 PROCEDURE — 99218 HC RM OBSERVATION: CPT

## 2021-02-07 PROCEDURE — 36415 COLL VENOUS BLD VENIPUNCTURE: CPT

## 2021-02-07 PROCEDURE — 74011000250 HC RX REV CODE- 250: Performed by: INTERNAL MEDICINE

## 2021-02-07 PROCEDURE — 96376 TX/PRO/DX INJ SAME DRUG ADON: CPT

## 2021-02-07 PROCEDURE — 80048 BASIC METABOLIC PNL TOTAL CA: CPT

## 2021-02-07 PROCEDURE — 94760 N-INVAS EAR/PLS OXIMETRY 1: CPT

## 2021-02-07 RX ORDER — POLYMYXIN B SULFATE AND TRIMETHOPRIM 1; 10000 MG/ML; [USP'U]/ML
1 SOLUTION OPHTHALMIC 4 TIMES DAILY
Status: DISCONTINUED | OUTPATIENT
Start: 2021-02-07 | End: 2021-02-09 | Stop reason: HOSPADM

## 2021-02-07 RX ADMIN — LABETALOL HYDROCHLORIDE 20 MG: 5 INJECTION INTRAVENOUS at 13:38

## 2021-02-07 RX ADMIN — HEPARIN SODIUM 5000 UNITS: 5000 INJECTION INTRAVENOUS; SUBCUTANEOUS at 00:17

## 2021-02-07 RX ADMIN — DONEPEZIL HYDROCHLORIDE 5 MG: 5 TABLET, FILM COATED ORAL at 00:05

## 2021-02-07 RX ADMIN — LABETALOL HYDROCHLORIDE 20 MG: 5 INJECTION INTRAVENOUS at 19:10

## 2021-02-07 RX ADMIN — MEMANTINE HYDROCHLORIDE 5 MG: 10 TABLET ORAL at 10:47

## 2021-02-07 RX ADMIN — LISINOPRIL 20 MG: 20 TABLET ORAL at 10:47

## 2021-02-07 RX ADMIN — AMLODIPINE BESYLATE 5 MG: 5 TABLET ORAL at 10:47

## 2021-02-07 RX ADMIN — HEPARIN SODIUM 5000 UNITS: 5000 INJECTION INTRAVENOUS; SUBCUTANEOUS at 10:48

## 2021-02-07 RX ADMIN — POLYMYXIN B SULFATE, TRIMETHOPRIM SULFATE 1 DROP: 10000; 1 SOLUTION/ DROPS OPHTHALMIC at 23:48

## 2021-02-07 RX ADMIN — LEVOTHYROXINE SODIUM 100 MCG: 0.1 TABLET ORAL at 10:48

## 2021-02-07 RX ADMIN — ATORVASTATIN CALCIUM 20 MG: 20 TABLET, FILM COATED ORAL at 00:06

## 2021-02-07 RX ADMIN — ATORVASTATIN CALCIUM 20 MG: 20 TABLET, FILM COATED ORAL at 23:47

## 2021-02-07 RX ADMIN — MEMANTINE HYDROCHLORIDE 10 MG: 10 TABLET ORAL at 00:06

## 2021-02-07 RX ADMIN — DONEPEZIL HYDROCHLORIDE 5 MG: 5 TABLET, FILM COATED ORAL at 23:47

## 2021-02-07 RX ADMIN — HEPARIN SODIUM 5000 UNITS: 5000 INJECTION INTRAVENOUS; SUBCUTANEOUS at 23:00

## 2021-02-07 RX ADMIN — MEMANTINE HYDROCHLORIDE 10 MG: 10 TABLET ORAL at 23:48

## 2021-02-07 RX ADMIN — ASPIRIN 81 MG: 81 TABLET, CHEWABLE ORAL at 10:47

## 2021-02-07 NOTE — PROGRESS NOTES
End of Shift Note    Bedside shift change report given to Edie    (oncoming nurse) by José Rodgers (offgoing nurse). Report included the following information SBAR, Kardex, Intake/Output and MAR    Shift worked:  Sat 7p-7a     Shift summary and any significant changes:       Confused, please help her call her daughter in am, wants to talk to CM on Monday, was in better mood after expressing anxiety with staff   Concerns for physician to address:  placement (waiting pending covid results)   Zone phone for oncoming shift:          Activity:  Activity Level: Up with Assistance  Number times ambulated in hallways past shift: 0  Number of times OOB to chair past shift: 0    Cardiac:   Cardiac Monitoring: yes          Access:   Current line(s)     Genitourinary:   Urinary status: ordonez    Respiratory:   O2 Device: Room air  Chronic home O2 use?: no   Incentive spirometer at bedside: no     GI:     Current diet:  DIET FULL LIQUID  DIET ONE TIME MESSAGE  Passing flatus: yes  Tolerating current diet: yes       Pain Management:   Patient states pain is manageable on current regimen: yes    Skin:  Korey Score: 15  Interventions: repostioning     Patient Safety:  Fall Score:  Total Score: 4  Interventions: call bell, bed low, bed locked   High Fall Risk: Yes    Length of Stay:  Expected LOS: - - -  Actual LOS: [facility]

## 2021-02-07 NOTE — PROGRESS NOTES
Problem: Falls - Risk of  Goal: *Absence of Falls  Description: Document Noe Angeler Fall Risk and appropriate interventions in the flowsheet. Outcome: Progressing Towards Goal  Note: Fall Risk Interventions:  Mobility Interventions: Bed/chair exit alarm, Communicate number of staff needed for ambulation/transfer, Patient to call before getting OOB    Mentation Interventions: Bed/chair exit alarm, Door open when patient unattended, More frequent rounding, Reorient patient, Room close to nurse's station, Toileting rounds, Update white board    Medication Interventions: Bed/chair exit alarm, Patient to call before getting OOB, Teach patient to arise slowly    Elimination Interventions: Call light in reach, Patient to call for help with toileting needs, Stay With Me (per policy)              Problem: Patient Education: Go to Patient Education Activity  Goal: Patient/Family Education  Outcome: Progressing Towards Goal     Problem: Pressure Injury - Risk of  Goal: *Prevention of pressure injury  Description: Document Korey Scale and appropriate interventions in the flowsheet.   Outcome: Progressing Towards Goal  Note: Pressure Injury Interventions:  Sensory Interventions: Assess changes in LOC    Moisture Interventions: Absorbent underpads    Activity Interventions: Assess need for specialty bed    Mobility Interventions: HOB 30 degrees or less    Nutrition Interventions: Document food/fluid/supplement intake                     Problem: Patient Education: Go to Patient Education Activity  Goal: Patient/Family Education  Outcome: Progressing Towards Goal

## 2021-02-08 PROBLEM — R55 SYNCOPE: Status: RESOLVED | Noted: 2021-02-03 | Resolved: 2021-02-08

## 2021-02-08 PROBLEM — R41.0 CONFUSION: Status: RESOLVED | Noted: 2021-02-04 | Resolved: 2021-02-08

## 2021-02-08 LAB
BACTERIA SPEC CULT: NORMAL
SERVICE CMNT-IMP: NORMAL

## 2021-02-08 PROCEDURE — 94760 N-INVAS EAR/PLS OXIMETRY 1: CPT

## 2021-02-08 PROCEDURE — 3331090002 HH PPS REVENUE DEBIT

## 2021-02-08 PROCEDURE — 94761 N-INVAS EAR/PLS OXIMETRY MLT: CPT

## 2021-02-08 PROCEDURE — 3331090001 HH PPS REVENUE CREDIT

## 2021-02-08 PROCEDURE — 96376 TX/PRO/DX INJ SAME DRUG ADON: CPT

## 2021-02-08 PROCEDURE — 99218 HC RM OBSERVATION: CPT

## 2021-02-08 PROCEDURE — 74011250637 HC RX REV CODE- 250/637: Performed by: INTERNAL MEDICINE

## 2021-02-08 RX ORDER — POLYETHYLENE GLYCOL 3350 17 G/17G
17 POWDER, FOR SOLUTION ORAL DAILY
Status: DISCONTINUED | OUTPATIENT
Start: 2021-02-09 | End: 2021-02-09 | Stop reason: HOSPADM

## 2021-02-08 RX ADMIN — POLYMYXIN B SULFATE, TRIMETHOPRIM SULFATE 1 DROP: 10000; 1 SOLUTION/ DROPS OPHTHALMIC at 17:25

## 2021-02-08 RX ADMIN — ACETAMINOPHEN 650 MG: 325 TABLET ORAL at 17:25

## 2021-02-08 RX ADMIN — LABETALOL HYDROCHLORIDE 20 MG: 5 INJECTION INTRAVENOUS at 02:29

## 2021-02-08 NOTE — PROGRESS NOTES
End of Shift Note     Bedside shift change report given to [facility]    (oncoming nurse) by Henry Guardado (offgoing nurse). Report included the following information SBAR, Kardex, Intake/Output and MAR     Shift worked:  Sunday 7p-7a      Shift summary and any significant changes:        Confused, wants to talk to CM today, remains constantly confused   Concerns for physician to address:     Zone phone for oncoming shift:            Activity:  Activity Level: Up with Assistance  Number times ambulated in hallways past shift: 0  Number of times OOB to chair past shift: 0     Cardiac:   Cardiac Monitoring: yes        Access:   Current line(s)      Genitourinary:   Urinary status: ordonez     Respiratory:   O2 Device: Room air  Chronic home O2 use?: no   Incentive spirometer at bedside: no  GI:  Current diet:  DIET FULL LIQUID  DIET ONE TIME MESSAGE  Passing flatus: yes  Tolerating current diet: yes     Pain Management:   Patient states pain is manageable on current regimen: yes     Skin:  Korey Score: 15  Interventions: repostioning     Patient Safety:  Fall Score: Total Score: 4  Interventions: call bell, bed low, bed locked   High Fall Risk:  Yes     Length of Stay:  Expected LOS: - - -  Actual LOS: 1        Henry Guardado

## 2021-02-08 NOTE — PROGRESS NOTES
End of Shift Note    Bedside shift change report given to Branden Olivares (oncoming nurse) by Namrata Leonardo RN (offgoing nurse). Report included the following information SBAR, Kardex and MAR    Shift worked:  7a-7p     Shift summary and any significant changes:     Pt stable through shift, pt refused cardiac monitroing, refused meds even after education on monitoring and meds. Pt refused being turned sometimes, pt just wanted to be left alone   Concerns for physician to address:       Zone phone for oncoming shift:          Activity:  Activity Level: Up with Assistance  Number times ambulated in hallways past shift: 0  Number of times OOB to chair past shift: 0    Cardiac:   Cardiac Monitoring: Refused      Cardiac Rhythm: (pt refused)    Access:   Current line(s): PIV     Genitourinary:   Urinary status: ordonez    Respiratory:   O2 Device: Room air  Chronic home O2 use?: NO  Incentive spirometer at bedside: NO     GI:     Current diet:  DIET ONE TIME MESSAGE  DIET CARDIAC Mechanical Soft  Passing flatus: YES  Tolerating current diet: YES       Pain Management:   Patient states pain is manageable on current regimen: YES    Skin:  Korey Score: 16  Interventions: turn team, float heels, increase time out of bed and internal/external urinary devices    Patient Safety:  Fall Score:  Total Score: 4  Interventions: bed/chair alarm, gripper socks and pt to call before getting OOB  High Fall Risk: Yes    Length of Stay:  Expected LOS: - - -  Actual LOS: [address] Saeed RN

## 2021-02-08 NOTE — PROGRESS NOTES
Hospitalist Progress Note    NAME: Kam Slade   :  1945   MRN:  [id number]       Assessment / Plan:  Syncopal episode, lethargy, generalized weakness, debility  Alzheimer's dementia  Syncope, ?vasovagal vs absence spell  -brief unreponsiveness waiting room Dr office day of admission  -seen by SLP, ok for regular diet, thin liquids  -PT/OT eval pending  -recent admission for UTI/AMS    CT head (no acute findings)  1. No evidence of acute intracranial abnormality. 2. Unchanged generalized parenchymal volume loss with disproportionate atrophy  of the bilateral hippocampi and mesial temporal lobes, suggestive of Alzheimer's  dementia. 3. 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 Urinary retention s/p ordonez  -Referred from urology as above  -Continue Ordonez catheter  -UA not impressive for UTI    Hx of Rt temporal lobe CVA  -asa, statin     Hypothyroidism  -TSH is elevated to 8.9  -FT4 in nL range. Cont current dose of synthroid     HTN, controlled  Cont home meds    Discussed with daughterRaúl . Not interested in hospice. Medically clear/ plan for DC        DVT ppx: heparin  DNR, daughter is POA  Admit as inpt expected 2 MN     Subjective:     Chief Complaint / Reason for Physician Visit  No distress. Laying in bed. Confused but verbal, denies pain. No distress     Discussed with RN events overnight. Review of Systems:  Symptom Y/N Comments  Symptom Y/N Comments   Fever/Chills    Chest Pain     Poor Appetite    Edema     Cough    Abdominal Pain     Sputum    Joint Pain     SOB/WETZEL    Pruritis/Rash     Nausea/vomit    Tolerating PT/OT     Diarrhea    Tolerating Diet     Constipation    Other       Could NOT obtain due to: ams     Objective:     VITALS:   Last 24hrs VS reviewed since prior progress note.  Most recent are:  Patient Vitals for the past 24 hrs:   Temp Pulse Resp BP SpO2   21 1930 98.2 °F (36.8 °C) 73 18 (!) 143/67 94 %   02/07/21 1521 98.3 °F (36.8 °C) 63 18 (!) 127/59 95 %   02/07/21 1144 99.1 °F (37.3 °C) 73 18 (!) 180/62 93 %   02/07/21 0829 98.4 °F (36.9 °C) 70 17 (!) 163/52 94 %   02/07/21 0300 98.3 °F (36.8 °C) 67 18 132/60 96 %   02/06/21 2315 98.5 °F (36.9 °C) 71 18 (!) 107/50 95 %       Intake/Output Summary (Last 24 hours) at 2/7/2021 2124  Last data filed at 2/7/2021 1830  Gross per 24 hour   Intake 120 ml   Output 1325 ml   Net -1205 ml        I had a face to face encounter and independently examined this patient on 2/7/2021, as outlined below:  PHYSICAL EXAM:  General: WD, WN. Alert, cooperative, no acute distress    EENT:  EOMI. Anicteric sclerae. MMM  Resp:  CTA bilaterally, no wheezing or rales. No accessory muscle use  CV:  Regular  rhythm,  No edema  GI:  Soft, Non distended, Non tender. +Bowel sounds  Neurologic:  Alert and oriented X 3, normal speech,   Psych:   Good insight. Not anxious nor agitated  Skin:  No rashes. No jaundice    Reviewed most current lab test results and cultures  YES  Reviewed most current radiology test results   YES  Review and summation of old records today    NO  Reviewed patient's current orders and MAR    YES  PMH/SH reviewed - no change compared to H&P  ________________________________________________________________________  Care Plan discussed with:    Comments   Patient x    Family      RN x    Care Manager     Consultant                        Multidiciplinary team rounds were held today with , nursing, pharmacist and clinical coordinator. Patient's plan of care was discussed; medications were reviewed and discharge planning was addressed.      ________________________________________________________________________  Total NON critical care TIME:  30   Minutes    Total CRITICAL CARE TIME Spent:   Minutes non procedure based      Comments   >50% of visit spent in counseling and coordination of care x ________________________________________________________________________  Pedro Pablo Beltran DO     Procedures: see electronic medical records for all procedures/Xrays and details which were not copied into this note but were reviewed prior to creation of Plan. LABS:  I reviewed today's most current labs and imaging studies.   Pertinent labs include:  Recent Labs     02/05/21  0122   WBC 8.3   HGB 14.3   HCT 44.6        Recent Labs     02/07/21  0416 02/06/21  0510 02/05/21  0122    140 142   K 3.5 3.2* 3.2*    104 107   CO2 28 25 29   GLU 93 74 86   BUN 14 11 11   CREA 0.93 0.93 0.87   CA 8.8 9.1 8.8       Signed: Pedro Pablo Beltran DO

## 2021-02-08 NOTE — PROGRESS NOTES
Problem: Falls - Risk of  Goal: *Absence of Falls  Description: Document Tariq Sol Fall Risk and appropriate interventions in the flowsheet. Outcome: Progressing Towards Goal  Note: Fall Risk Interventions:  Mobility Interventions: Bed/chair exit alarm, Communicate number of staff needed for ambulation/transfer, Patient to call before getting OOB    Mentation Interventions: Bed/chair exit alarm, Door open when patient unattended, More frequent rounding, Room close to nurse's station    Medication Interventions: Bed/chair exit alarm, Patient to call before getting OOB    Elimination Interventions: Bed/chair exit alarm, Call light in reach, Toileting schedule/hourly rounds              Problem: Patient Education: Go to Patient Education Activity  Goal: Patient/Family Education  Outcome: Progressing Towards Goal     Problem: Pressure Injury - Risk of  Goal: *Prevention of pressure injury  Description: Document Korey Scale and appropriate interventions in the flowsheet. Outcome: Progressing Towards Goal  Note: Pressure Injury Interventions:  Sensory Interventions: Assess changes in LOC, Keep linens dry and wrinkle-free, Maintain/enhance activity level, Minimize linen layers, Turn and reposition approx. every two hours (pillows and wedges if needed)    Moisture Interventions: Absorbent underpads, Apply protective barrier, creams and emollients, Internal/External urinary devices, Minimize layers    Activity Interventions: Assess need for specialty bed, Increase time out of bed, PT/OT evaluation    Mobility Interventions: Assess need for specialty bed, Float heels, HOB 30 degrees or less, Turn and reposition approx.  every two hours(pillow and wedges)    Nutrition Interventions: Document food/fluid/supplement intake                     Problem: Patient Education: Go to Patient Education Activity  Goal: Patient/Family Education  Outcome: Progressing Towards Goal

## 2021-02-08 NOTE — PROGRESS NOTES
[phone] Hospital or Facility: [facility] From: Chema Nuñez RE: Freddie Kruger 1945 RM: 1120-01 Pt is refusing cardiac monitoring, so we cant give her Iv labetalol. Need Callback: NEEDS CALLBACK ONCOLOGY    Pt is refusing her meds this morning she is complaining of an upset stomach, asked if she wanted some medicine to help withthat pt stated \"no I just want to be left alone? .  Going to try to give them again in an hour  At 1020 tried to get Cortez Zack to take her meds, she refused even after education saying she just wanted to sleep, tried to fix gown but she said she wanted it that way,

## 2021-02-08 NOTE — PROGRESS NOTES
Transition of Care Plan:    Disposition: [facility]   Follow up appointments: PCP  Transportation at Discharge: [facility], ETA 10AM on 2/9/2021   DME needed: N/A   IM Medicare letter: to be reviewed with daughter in AM   Caregiver Contact: Kaden Bai CM aware of discharge order. ROXANA has faxed completed H&P this afternoon to [facility] ([phone]). Note as of mid-day, H&P was not completed in its entirety which is a requirement for placement and caused delay. ROXANA has since spoken with [facility] ([phone]) and confirmed that pt has been accepted and can be admitted on tomorrow, 2/9/2021. ROXANA has arranged for BLS transport via [facility] with ETA of 10 AM. PCS form to be completed in AM. ROXANA has updated daughter, Geno Laird, at bedside and by phone this afternoon. She verbalized understanding and is agreeable to d/c plan. She is going to fill out admission paperwork now at [facility]. ROXANA will continue to follow.     Bunny Nielson, MSW   Care Manager, [facility]  [phone]

## 2021-02-08 NOTE — DISCHARGE SUMMARY
Hospitalist Discharge Summary     Patient ID:  Geneva Gan  [id number]  28 y.o.  1945  2/3/2021    PCP on record: Chyna Wagner MD    Admit date: 2/3/2021  Discharge date and time: 2/8/2021    DISCHARGE DIAGNOSIS:    Syncopal episode, lethargy, generalized weakness, debility  Alzheimer's dementia  Syncope, ?vasovagal vs absence spell       Urinary retention s/p ordonez      Hypokalemia    CONSULTATIONS:  IP CONSULT TO HOSPITALIST    Excerpted HPI from H&P of Gina Olmstead, DO:    Geneva Gan is a 66 y.o. female with PMH of progressive dementia, indwelling Ordonez catheter presenting to ED from urology where she was found to be lethargic.  Daughter states they were sitting in the waiting room for 75 minutes and then she noticed her mom got cool and clammy and her head leaned back and it looked like she was napping, but she couldn't wake her up. They called EMS and reportedly were unable to capture a BP. Pt then came to after a few minutes and remained a bit altered and sleepy for a while before coming back to her baseline. She has sigificantly declined over the last few weeks. She is very confused at baseline but is was more somnolent than usual.  She had an episode a few months ago where her eyes rolled back and her eyes were moving back and forth so she was started on keppra for possible seizures but daughter states that after a thorough workup they were told she did not have seizures and stopped the keppra (pt had some sort of allergic reaction to it anyway). Currently pt is mildly sleepy but easily arousable to voice and oriented to name and location. She denies any pain, NAD, no acute complaints. She is wondering why she is in the hospital. Decreased PO intake recently too.   Patient resides at Home with significant other and caregiver to help provide 24/7 care but they need more help and are interested in placement  Patient ambulates with max assist  ______________________________________________________________________  DISCHARGE SUMMARY/HOSPITAL COURSE:  for full details see H&P, daily progress notes, labs, consult notes. Syncopal episode, lethargy, generalized weakness, debility  Alzheimer's dementia  Syncope, ?vasovagal vs absence spell  -brief unreponsiveness waiting room Dr office day of admission  -seen by SLP, ok for regular diet, thin liquids     CT head (no acute findings)  1. No evidence of acute intracranial abnormality. 2. Unchanged generalized parenchymal volume loss with disproportionate atrophy  of the bilateral hippocampi and mesial temporal lobes, suggestive of Alzheimer's  dementia. 3. 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 Patient mentation back to baseline as per her daughter at bedside     Urinary retention s/p ordonez  -Referred from urology as above  -Continue Ordonez catheter  -UA with no pyuria or bacteriuria    Hypokalemia  Was replaced    _______________________________________________________________________  Patient seen and examined by me on discharge day. Pertinent Findings:  Gen:    Not in distress  Chest: Clear lungs  CVS:   Regular rhythm. No edema  Abd:  Soft, not distended, not tender  Neuro:  Alert,   _______________________________________________________________________  DISCHARGE MEDICATIONS:   Current Discharge Medication List      CONTINUE these medications which have NOT CHANGED    Details   memantine ER (Namenda XR) 21 mg capsule Take 21 mg by mouth daily. atorvastatin (LIPITOR) 20 mg tablet Take 1 Tab by mouth daily. Qty: 30 Tab, Refills: 0      aspirin 81 mg chewable tablet Take 1 Tab by mouth daily. Qty: 30 Tab, Refills: 0      levothyroxine (SYNTHROID) 100 mcg tablet Take 100 mcg by mouth Daily (before breakfast). calcium-cholecalciferol, d3, 500 mg(1,250mg) -400 unit chew Take 2 Tabs by mouth daily. amLODIPine (NORVASC) 5 mg tablet Take 5 mg by mouth daily. donepezil (ARICEPT) 5 mg tablet Take 5 mg by mouth nightly. latanoprost (XALATAN) 0.005 % ophthalmic solution Administer 1 Drop to both eyes nightly. lisinopril (PRINIVIL, ZESTRIL) 20 mg tablet Take 20 mg by mouth daily. Patient Follow Up Instructions:    Activity: Activity as tolerated  Diet: Regular Diet  Wound Care: None needed        Follow-up Information     Follow up With Specialties Details Why Contact Info    Curt Carloslópez, [address] [facility]. [phone]          ________________________________________________________________    Risk of deterioration: Low    Condition at Discharge:  Stable  __________________________________________________________________    Disposition  SNF/LTC    ____________________________________________________________________    Code Status: DNR/DNI  ___________________________________________________________________      Total time in minutes spent coordinating this discharge (includes going over instructions, follow-up, prescriptions, and preparing report for sign off to her PCP) :  >30 minutes    Signed:  Hilda Nicholas MD

## 2021-02-09 VITALS
DIASTOLIC BLOOD PRESSURE: 78 MMHG | SYSTOLIC BLOOD PRESSURE: 118 MMHG | OXYGEN SATURATION: 95 % | HEART RATE: 64 BPM | TEMPERATURE: 99 F | RESPIRATION RATE: 18 BRPM

## 2021-02-09 VITALS
WEIGHT: 160 LBS | DIASTOLIC BLOOD PRESSURE: 58 MMHG | TEMPERATURE: 97.8 F | BODY MASS INDEX: 28.35 KG/M2 | HEART RATE: 70 BPM | SYSTOLIC BLOOD PRESSURE: 151 MMHG | OXYGEN SATURATION: 92 % | RESPIRATION RATE: 18 BRPM | HEIGHT: 63 IN

## 2021-02-09 PROCEDURE — 3331090002 HH PPS REVENUE DEBIT

## 2021-02-09 PROCEDURE — 94760 N-INVAS EAR/PLS OXIMETRY 1: CPT

## 2021-02-09 PROCEDURE — 96376 TX/PRO/DX INJ SAME DRUG ADON: CPT

## 2021-02-09 PROCEDURE — 94761 N-INVAS EAR/PLS OXIMETRY MLT: CPT

## 2021-02-09 PROCEDURE — 99218 HC RM OBSERVATION: CPT

## 2021-02-09 PROCEDURE — 3331090001 HH PPS REVENUE CREDIT

## 2021-02-09 RX ADMIN — LABETALOL HYDROCHLORIDE 20 MG: 5 INJECTION INTRAVENOUS at 07:49

## 2021-02-09 NOTE — PROGRESS NOTES
Problem: Falls - Risk of  Goal: *Absence of Falls  Description: Document Cornelio Witt Fall Risk and appropriate interventions in the flowsheet. Outcome: Progressing Towards Goal  Note: Fall Risk Interventions:  Mobility Interventions: Communicate number of staff needed for ambulation/transfer    Mentation Interventions: Bed/chair exit alarm    Medication Interventions: Bed/chair exit alarm    Elimination Interventions: Call light in reach, Bed/chair exit alarm              Problem: Pressure Injury - Risk of  Goal: *Prevention of pressure injury  Description: Document Korey Scale and appropriate interventions in the flowsheet.   Outcome: Progressing Towards Goal  Note: Pressure Injury Interventions:  Sensory Interventions: Assess changes in LOC, Keep linens dry and wrinkle-free, Float heels, Minimize linen layers    Moisture Interventions: Absorbent underpads, Internal/External urinary devices    Activity Interventions: Assess need for specialty bed    Mobility Interventions: Float heels, HOB 30 degrees or less, Assess need for specialty bed    Nutrition Interventions: Document food/fluid/supplement intake

## 2021-02-09 NOTE — PROGRESS NOTES
End of Shift Note    Bedside shift change report given to [facility] (oncoming nurse) by Red Akers (offgoing nurse). Report included the following information SBAR, Kardex, ED Summary, Intake/Output, MAR and Accordion    Shift worked:  night     Shift summary and any significant changes:    PT stable throughout shift. Scheduled meds refused. PT stated she \"tai wanted to rest and that she had been checked on enough\". Refusal documented. Concerns for physician to address:    Zone phone for oncoming shift:       Activity:  Activity Level: Up with Assistance  Number times ambulated in hallways past shift: 0  Number of times OOB to chair past shift: 0    Cardiac:   Cardiac Monitoring: Refused      Cardiac Rhythm: (pt refused)    Access:   Current line(s): PIV     Genitourinary:   Urinary status: ordonez    Respiratory:   O2 Device: Room air  Chronic home O2 use?: NO  Incentive spirometer at bedside: NO     GI:     Current diet:  DIET ONE TIME MESSAGE  DIET CARDIAC Mechanical Soft  Passing flatus: YES  Tolerating current diet: YES       Pain Management:   Patient states pain is manageable on current regimen: N/A    Skin:  Kroey Score: 17  Interventions: turn team and float heels    Patient Safety:  Fall Score:  Total Score: 4  Interventions: bed/chair alarm, assistive device (walker, cane, etc), gripper socks and pt to call before getting OOB  High Fall Risk: Yes    Length of Stay:  Expected LOS: - - -  Actual LOS: [address]

## 2021-02-09 NOTE — PROGRESS NOTES
TRANSFER - OUT REPORT:    Verbal report given to [employer] (name) on Elio Trivedi  being transferred to [facility], [phone] (unit) for routine progression of care       Report consisted of patients Situation, Background, Assessment and   Recommendations(SBAR). Information from the following report(s) SBAR, Kardex and Quality Measures was reviewed with the receiving nurse. Lines:   Peripheral IV 02/07/21 Anterior;Right Wrist (Active)   Site Assessment Clean, dry, & intact 02/08/21 1914   Phlebitis Assessment 0 02/08/21 1914   Infiltration Assessment 0 02/08/21 1914   Dressing Status Clean, dry, & intact 02/08/21 1914   Dressing Type Tape;Transparent 02/08/21 1914   Hub Color/Line Status Flushed;Patent 02/08/21 1914   Action Taken Open ports on tubing capped 02/07/21 0715   Alcohol Cap Used No 02/08/21 1914        Opportunity for questions and clarification was provided.       Patient transported with:   Joseph and Patient's belongings via [facility]

## 2021-02-09 NOTE — PROGRESS NOTES
Transition of Care Plan:    Disposition: [facility] w/ resumption of [facility] ()  Follow up appointments: PCP  Transportation at Discharge: [facility], ETA 10AM   DME needed: N/A  IM Medicare letter: N/A (observation status)  Caregiver Contact: Collin Guerrero [phone]    ROXANA aware of discharge order. CM has completed PCS form to include copy of H&P, facesheet, and signed DDNR which was placed into chart. [facility] to  pt for d/c at 10AM. CM has spoken with daughter by phone this AM and met with her at bedside upon her arrival to review and finalize discharge plan. [address] letter signed. Pt to resume [facility] SN services upon arriving to [facility]. CM spoke with Isabelle Rodriguez this AM at [facility]. Confirmed that pt can still admit today as planned. No barriers to d/c identified at this time. Nurse to call report to [facility]. @ [phone]. Hard copy prescriptions requested if any new medications prescribed. Informed hospitalist.    Pt is ready for d/c from a CM standpoint. Assigned RN informed. Care Management Interventions  PCP Verified by CM:  Yes  Last Visit to PCP: 01/28/21  Palliative Care Criteria Met (RRAT>21 & CHF Dx)?: No  Mode of Transport at Discharge: [facility] Time of Discharge: 1000  Transition of Care Consult (CM Consult): Discharge Planning  Discharge Durable Medical Equipment: No  Physical Therapy Consult: Yes  Occupational Therapy Consult: Yes  Speech Therapy Consult: Yes  Current Support Network: Assisted Living(discharged to [facility] with [facility])  Confirm Follow Up Transport: Family  The Plan for Transition of Care is Related to the Following Treatment Goals : [facility] placement   The Patient and/or Patient Representative was Provided with a Choice of Provider and Agrees with the Discharge Plan?: Yes  Name of the Patient Representative Who was Provided with a Choice of Provider and Agrees with the Discharge Plan: Collin Guerrero   [place] of Choice List was Provided with Basic Dialogue that Supports the Patient's Individualized Plan of Care/Goals, Treatment Preferences and Shares the Quality Data Associated with the Providers?: Yes   Resource Information Provided?: No  Discharge Location  Discharge Placement: Assisted Living    Huber Rodriguez, [address], [facility]  [phone]

## 2021-02-10 PROCEDURE — 3331090002 HH PPS REVENUE DEBIT

## 2021-02-10 PROCEDURE — 3331090001 HH PPS REVENUE CREDIT

## 2021-02-11 PROCEDURE — 3331090001 HH PPS REVENUE CREDIT

## 2021-02-11 PROCEDURE — 3331090002 HH PPS REVENUE DEBIT

## 2021-02-12 PROCEDURE — 3331090002 HH PPS REVENUE DEBIT

## 2021-02-12 PROCEDURE — 3331090001 HH PPS REVENUE CREDIT

## 2021-02-13 PROCEDURE — 3331090002 HH PPS REVENUE DEBIT

## 2021-02-13 PROCEDURE — 3331090001 HH PPS REVENUE CREDIT

## 2021-02-14 PROCEDURE — 3331090002 HH PPS REVENUE DEBIT

## 2021-02-14 PROCEDURE — 3331090001 HH PPS REVENUE CREDIT

## 2021-02-15 PROCEDURE — 3331090002 HH PPS REVENUE DEBIT

## 2021-02-15 PROCEDURE — 3331090001 HH PPS REVENUE CREDIT

## 2021-02-16 PROCEDURE — 3331090001 HH PPS REVENUE CREDIT

## 2021-02-16 PROCEDURE — 3331090002 HH PPS REVENUE DEBIT

## 2021-02-17 PROCEDURE — 3331090002 HH PPS REVENUE DEBIT

## 2021-02-17 PROCEDURE — 3331090001 HH PPS REVENUE CREDIT

## 2021-02-18 PROCEDURE — 3331090001 HH PPS REVENUE CREDIT

## 2021-02-18 PROCEDURE — 3331090002 HH PPS REVENUE DEBIT

## 2021-02-19 PROCEDURE — 3331090001 HH PPS REVENUE CREDIT

## 2021-02-19 PROCEDURE — 3331090002 HH PPS REVENUE DEBIT

## 2021-02-20 PROCEDURE — 3331090001 HH PPS REVENUE CREDIT

## 2021-02-20 PROCEDURE — 3331090002 HH PPS REVENUE DEBIT

## 2021-02-21 PROCEDURE — 3331090001 HH PPS REVENUE CREDIT

## 2021-02-21 PROCEDURE — 3331090002 HH PPS REVENUE DEBIT

## 2021-02-22 PROCEDURE — 3331090001 HH PPS REVENUE CREDIT

## 2021-02-22 PROCEDURE — 3331090002 HH PPS REVENUE DEBIT

## 2021-02-23 PROCEDURE — 3331090001 HH PPS REVENUE CREDIT

## 2021-02-23 PROCEDURE — 3331090002 HH PPS REVENUE DEBIT

## 2021-02-24 PROCEDURE — 3331090002 HH PPS REVENUE DEBIT

## 2021-02-24 PROCEDURE — 3331090001 HH PPS REVENUE CREDIT

## 2021-02-25 PROCEDURE — 3331090002 HH PPS REVENUE DEBIT

## 2021-02-25 PROCEDURE — 3331090001 HH PPS REVENUE CREDIT

## 2021-02-26 PROCEDURE — 3331090002 HH PPS REVENUE DEBIT

## 2021-02-26 PROCEDURE — 3331090001 HH PPS REVENUE CREDIT

## 2021-02-27 PROCEDURE — 3331090001 HH PPS REVENUE CREDIT

## 2021-02-27 PROCEDURE — 3331090002 HH PPS REVENUE DEBIT

## 2021-02-28 PROCEDURE — 3331090003 HH PPS REVENUE ADJ

## 2021-02-28 PROCEDURE — 3331090001 HH PPS REVENUE CREDIT

## 2021-02-28 PROCEDURE — 3331090002 HH PPS REVENUE DEBIT

## 2021-03-01 PROCEDURE — 400018 HH-NO PAY CLAIM PROCEDURE

## 2021-03-02 ENCOUNTER — HOSPICE ADMISSION (OUTPATIENT)
Dept: HOSPICE | Facility: HOSPICE | Age: 76
End: 2021-03-02
Payer: MEDICARE

## 2021-03-04 ENCOUNTER — HOME CARE VISIT (OUTPATIENT)
Dept: SCHEDULING | Facility: HOME HEALTH | Age: 76
End: 2021-03-04
Payer: MEDICARE

## 2021-03-04 VITALS
HEART RATE: 76 BPM | RESPIRATION RATE: 16 BRPM | OXYGEN SATURATION: 95 % | SYSTOLIC BLOOD PRESSURE: 130 MMHG | DIASTOLIC BLOOD PRESSURE: 58 MMHG

## 2021-03-04 PROCEDURE — G0299 HHS/HOSPICE OF RN EA 15 MIN: HCPCS

## 2021-03-04 PROCEDURE — 0651 HSPC ROUTINE HOME CARE

## 2021-03-04 PROCEDURE — 3336500001 HSPC ELECTION

## 2021-03-04 PROCEDURE — HOSPICE MEDICATION HC HH HOSPICE MEDICATION

## 2021-03-05 ENCOUNTER — HOME CARE VISIT (OUTPATIENT)
Dept: SCHEDULING | Facility: HOME HEALTH | Age: 76
End: 2021-03-05
Payer: MEDICARE

## 2021-03-05 ENCOUNTER — HOME CARE VISIT (OUTPATIENT)
Dept: HOSPICE | Facility: HOSPICE | Age: 76
End: 2021-03-05
Payer: MEDICARE

## 2021-03-05 PROCEDURE — G0155 HHCP-SVS OF CSW,EA 15 MIN: HCPCS

## 2021-03-05 PROCEDURE — G0299 HHS/HOSPICE OF RN EA 15 MIN: HCPCS

## 2021-03-05 PROCEDURE — 0651 HSPC ROUTINE HOME CARE

## 2021-03-06 VITALS — RESPIRATION RATE: 16 BRPM | SYSTOLIC BLOOD PRESSURE: 140 MMHG | DIASTOLIC BLOOD PRESSURE: 80 MMHG

## 2021-03-06 PROCEDURE — 0651 HSPC ROUTINE HOME CARE

## 2021-03-07 PROCEDURE — 0651 HSPC ROUTINE HOME CARE

## 2021-03-08 ENCOUNTER — HOME CARE VISIT (OUTPATIENT)
Dept: HOSPICE | Facility: HOSPICE | Age: 76
End: 2021-03-08
Payer: MEDICARE

## 2021-03-08 ENCOUNTER — HOME CARE VISIT (OUTPATIENT)
Dept: SCHEDULING | Facility: HOME HEALTH | Age: 76
End: 2021-03-08
Payer: MEDICARE

## 2021-03-08 PROCEDURE — 0651 HSPC ROUTINE HOME CARE

## 2021-03-09 ENCOUNTER — HOME CARE VISIT (OUTPATIENT)
Dept: HOSPICE | Facility: HOSPICE | Age: 76
End: 2021-03-09
Payer: MEDICARE

## 2021-03-09 PROCEDURE — 0651 HSPC ROUTINE HOME CARE

## 2021-03-10 ENCOUNTER — HOME CARE VISIT (OUTPATIENT)
Dept: SCHEDULING | Facility: HOME HEALTH | Age: 76
End: 2021-03-10
Payer: MEDICARE

## 2021-03-10 PROCEDURE — G0156 HHCP-SVS OF AIDE,EA 15 MIN: HCPCS

## 2021-03-10 PROCEDURE — 0651 HSPC ROUTINE HOME CARE

## 2021-03-11 ENCOUNTER — HOME CARE VISIT (OUTPATIENT)
Dept: HOSPICE | Facility: HOSPICE | Age: 76
End: 2021-03-11
Payer: MEDICARE

## 2021-03-11 ENCOUNTER — HOME CARE VISIT (OUTPATIENT)
Dept: SCHEDULING | Facility: HOME HEALTH | Age: 76
End: 2021-03-11
Payer: MEDICARE

## 2021-03-11 PROCEDURE — 0651 HSPC ROUTINE HOME CARE

## 2021-03-11 PROCEDURE — G0299 HHS/HOSPICE OF RN EA 15 MIN: HCPCS

## 2021-03-12 ENCOUNTER — HOME CARE VISIT (OUTPATIENT)
Dept: SCHEDULING | Facility: HOME HEALTH | Age: 76
End: 2021-03-12
Payer: MEDICARE

## 2021-03-12 PROCEDURE — 0651 HSPC ROUTINE HOME CARE

## 2021-03-12 PROCEDURE — G0156 HHCP-SVS OF AIDE,EA 15 MIN: HCPCS

## 2021-03-13 PROCEDURE — 0651 HSPC ROUTINE HOME CARE

## 2021-03-14 PROCEDURE — 0651 HSPC ROUTINE HOME CARE

## 2021-03-15 VITALS
DIASTOLIC BLOOD PRESSURE: 68 MMHG | HEART RATE: 104 BPM | SYSTOLIC BLOOD PRESSURE: 128 MMHG | OXYGEN SATURATION: 95 % | RESPIRATION RATE: 16 BRPM | TEMPERATURE: 98.1 F

## 2021-03-15 PROCEDURE — 0651 HSPC ROUTINE HOME CARE

## 2021-03-16 PROCEDURE — 0651 HSPC ROUTINE HOME CARE

## 2021-03-17 ENCOUNTER — HOME CARE VISIT (OUTPATIENT)
Dept: SCHEDULING | Facility: HOME HEALTH | Age: 76
End: 2021-03-17
Payer: MEDICARE

## 2021-03-17 PROBLEM — Z51.5 HOSPICE CARE PATIENT: Status: ACTIVE | Noted: 2021-03-17

## 2021-03-17 PROCEDURE — G0156 HHCP-SVS OF AIDE,EA 15 MIN: HCPCS

## 2021-03-17 PROCEDURE — 0651 HSPC ROUTINE HOME CARE

## 2021-03-18 ENCOUNTER — HOME CARE VISIT (OUTPATIENT)
Dept: SCHEDULING | Facility: HOME HEALTH | Age: 76
End: 2021-03-18
Payer: MEDICARE

## 2021-03-18 PROCEDURE — G0299 HHS/HOSPICE OF RN EA 15 MIN: HCPCS

## 2021-03-18 PROCEDURE — 0651 HSPC ROUTINE HOME CARE

## 2021-03-19 ENCOUNTER — HOME CARE VISIT (OUTPATIENT)
Dept: SCHEDULING | Facility: HOME HEALTH | Age: 76
End: 2021-03-19
Payer: MEDICARE

## 2021-03-19 VITALS — RESPIRATION RATE: 16 BRPM | SYSTOLIC BLOOD PRESSURE: 110 MMHG | DIASTOLIC BLOOD PRESSURE: 60 MMHG

## 2021-03-19 PROCEDURE — G0156 HHCP-SVS OF AIDE,EA 15 MIN: HCPCS

## 2021-03-19 PROCEDURE — 0651 HSPC ROUTINE HOME CARE

## 2021-03-20 PROCEDURE — 0651 HSPC ROUTINE HOME CARE

## 2021-03-21 PROCEDURE — 0651 HSPC ROUTINE HOME CARE

## 2021-03-22 PROCEDURE — 0651 HSPC ROUTINE HOME CARE

## 2021-03-23 ENCOUNTER — HOME CARE VISIT (OUTPATIENT)
Dept: SCHEDULING | Facility: HOME HEALTH | Age: 76
End: 2021-03-23
Payer: MEDICARE

## 2021-03-23 PROCEDURE — G0156 HHCP-SVS OF AIDE,EA 15 MIN: HCPCS

## 2021-03-23 PROCEDURE — 0651 HSPC ROUTINE HOME CARE

## 2021-03-24 ENCOUNTER — HOME CARE VISIT (OUTPATIENT)
Dept: HOSPICE | Facility: HOSPICE | Age: 76
End: 2021-03-24
Payer: MEDICARE

## 2021-03-24 PROCEDURE — 0651 HSPC ROUTINE HOME CARE

## 2021-03-25 ENCOUNTER — HOME CARE VISIT (OUTPATIENT)
Dept: HOSPICE | Facility: HOSPICE | Age: 76
End: 2021-03-25
Payer: MEDICARE

## 2021-03-25 PROCEDURE — 0651 HSPC ROUTINE HOME CARE

## 2021-03-26 ENCOUNTER — HOME CARE VISIT (OUTPATIENT)
Dept: SCHEDULING | Facility: HOME HEALTH | Age: 76
End: 2021-03-26
Payer: MEDICARE

## 2021-03-26 VITALS
SYSTOLIC BLOOD PRESSURE: 116 MMHG | RESPIRATION RATE: 16 BRPM | DIASTOLIC BLOOD PRESSURE: 68 MMHG | HEART RATE: 70 BPM | TEMPERATURE: 98.1 F | OXYGEN SATURATION: 96 %

## 2021-03-26 PROCEDURE — G0156 HHCP-SVS OF AIDE,EA 15 MIN: HCPCS

## 2021-03-26 PROCEDURE — G0299 HHS/HOSPICE OF RN EA 15 MIN: HCPCS

## 2021-03-26 PROCEDURE — 0651 HSPC ROUTINE HOME CARE

## 2021-03-27 PROCEDURE — 0651 HSPC ROUTINE HOME CARE

## 2021-03-28 PROCEDURE — 0651 HSPC ROUTINE HOME CARE

## 2021-03-29 PROCEDURE — 0651 HSPC ROUTINE HOME CARE

## 2021-03-30 ENCOUNTER — HOME CARE VISIT (OUTPATIENT)
Dept: SCHEDULING | Facility: HOME HEALTH | Age: 76
End: 2021-03-30
Payer: MEDICARE

## 2021-03-30 ENCOUNTER — HOME CARE VISIT (OUTPATIENT)
Dept: HOSPICE | Facility: HOSPICE | Age: 76
End: 2021-03-30
Payer: MEDICARE

## 2021-03-30 PROCEDURE — 0651 HSPC ROUTINE HOME CARE

## 2021-03-30 PROCEDURE — G0156 HHCP-SVS OF AIDE,EA 15 MIN: HCPCS

## 2021-03-31 PROCEDURE — 0651 HSPC ROUTINE HOME CARE

## 2021-04-01 ENCOUNTER — HOME CARE VISIT (OUTPATIENT)
Dept: SCHEDULING | Facility: HOME HEALTH | Age: 76
End: 2021-04-01
Payer: MEDICARE

## 2021-04-01 VITALS
OXYGEN SATURATION: 97 % | RESPIRATION RATE: 18 BRPM | DIASTOLIC BLOOD PRESSURE: 69 MMHG | TEMPERATURE: 97.9 F | HEART RATE: 88 BPM | SYSTOLIC BLOOD PRESSURE: 124 MMHG

## 2021-04-01 PROCEDURE — G0300 HHS/HOSPICE OF LPN EA 15 MIN: HCPCS

## 2021-04-01 PROCEDURE — 0651 HSPC ROUTINE HOME CARE

## 2021-04-02 ENCOUNTER — HOME CARE VISIT (OUTPATIENT)
Dept: SCHEDULING | Facility: HOME HEALTH | Age: 76
End: 2021-04-02
Payer: MEDICARE

## 2021-04-02 PROCEDURE — G0156 HHCP-SVS OF AIDE,EA 15 MIN: HCPCS

## 2021-04-02 PROCEDURE — G0155 HHCP-SVS OF CSW,EA 15 MIN: HCPCS

## 2021-04-02 PROCEDURE — 0651 HSPC ROUTINE HOME CARE

## 2021-04-03 PROCEDURE — 0651 HSPC ROUTINE HOME CARE

## 2021-04-04 PROCEDURE — 0651 HSPC ROUTINE HOME CARE

## 2021-04-05 PROCEDURE — 0651 HSPC ROUTINE HOME CARE

## 2021-04-06 ENCOUNTER — HOME CARE VISIT (OUTPATIENT)
Dept: SCHEDULING | Facility: HOME HEALTH | Age: 76
End: 2021-04-06
Payer: MEDICARE

## 2021-04-06 PROCEDURE — G0156 HHCP-SVS OF AIDE,EA 15 MIN: HCPCS

## 2021-04-06 PROCEDURE — 0651 HSPC ROUTINE HOME CARE

## 2021-04-07 PROCEDURE — 0651 HSPC ROUTINE HOME CARE

## 2021-04-08 ENCOUNTER — HOME CARE VISIT (OUTPATIENT)
Dept: SCHEDULING | Facility: HOME HEALTH | Age: 76
End: 2021-04-08
Payer: MEDICARE

## 2021-04-08 VITALS
SYSTOLIC BLOOD PRESSURE: 100 MMHG | OXYGEN SATURATION: 90 % | RESPIRATION RATE: 18 BRPM | HEART RATE: 100 BPM | DIASTOLIC BLOOD PRESSURE: 60 MMHG

## 2021-04-08 PROCEDURE — G0299 HHS/HOSPICE OF RN EA 15 MIN: HCPCS

## 2021-04-08 PROCEDURE — 0651 HSPC ROUTINE HOME CARE

## 2021-04-09 ENCOUNTER — HOME CARE VISIT (OUTPATIENT)
Dept: SCHEDULING | Facility: HOME HEALTH | Age: 76
End: 2021-04-09
Payer: MEDICARE

## 2021-04-09 PROCEDURE — 0651 HSPC ROUTINE HOME CARE

## 2021-04-09 PROCEDURE — G0156 HHCP-SVS OF AIDE,EA 15 MIN: HCPCS

## 2021-04-10 PROCEDURE — 0651 HSPC ROUTINE HOME CARE

## 2021-04-11 PROCEDURE — 0651 HSPC ROUTINE HOME CARE

## 2021-04-12 PROCEDURE — 0651 HSPC ROUTINE HOME CARE

## 2021-04-13 ENCOUNTER — HOME CARE VISIT (OUTPATIENT)
Dept: SCHEDULING | Facility: HOME HEALTH | Age: 76
End: 2021-04-13
Payer: MEDICARE

## 2021-04-13 VITALS
SYSTOLIC BLOOD PRESSURE: 110 MMHG | RESPIRATION RATE: 24 BRPM | DIASTOLIC BLOOD PRESSURE: 68 MMHG | OXYGEN SATURATION: 88 % | HEART RATE: 90 BPM

## 2021-04-13 PROCEDURE — G0156 HHCP-SVS OF AIDE,EA 15 MIN: HCPCS

## 2021-04-13 PROCEDURE — 0651 HSPC ROUTINE HOME CARE

## 2021-04-13 PROCEDURE — G0299 HHS/HOSPICE OF RN EA 15 MIN: HCPCS

## 2021-04-14 PROCEDURE — 0651 HSPC ROUTINE HOME CARE

## 2021-04-15 PROCEDURE — 0651 HSPC ROUTINE HOME CARE

## 2021-04-16 ENCOUNTER — HOME CARE VISIT (OUTPATIENT)
Dept: SCHEDULING | Facility: HOME HEALTH | Age: 76
End: 2021-04-16
Payer: MEDICARE

## 2021-04-16 PROCEDURE — 0651 HSPC ROUTINE HOME CARE

## 2021-04-16 PROCEDURE — G0156 HHCP-SVS OF AIDE,EA 15 MIN: HCPCS

## 2021-04-17 PROCEDURE — 0651 HSPC ROUTINE HOME CARE

## 2021-04-18 PROCEDURE — 0651 HSPC ROUTINE HOME CARE

## 2021-04-19 PROCEDURE — 0651 HSPC ROUTINE HOME CARE

## 2021-04-20 ENCOUNTER — HOME CARE VISIT (OUTPATIENT)
Dept: SCHEDULING | Facility: HOME HEALTH | Age: 76
End: 2021-04-20
Payer: MEDICARE

## 2021-04-20 VITALS
HEART RATE: 81 BPM | DIASTOLIC BLOOD PRESSURE: 70 MMHG | RESPIRATION RATE: 22 BRPM | OXYGEN SATURATION: 85 % | SYSTOLIC BLOOD PRESSURE: 140 MMHG

## 2021-04-20 PROCEDURE — 0651 HSPC ROUTINE HOME CARE

## 2021-04-20 PROCEDURE — G0156 HHCP-SVS OF AIDE,EA 15 MIN: HCPCS

## 2021-04-20 PROCEDURE — G0299 HHS/HOSPICE OF RN EA 15 MIN: HCPCS

## 2021-04-21 ENCOUNTER — HOME CARE VISIT (OUTPATIENT)
Dept: HOSPICE | Facility: HOSPICE | Age: 76
End: 2021-04-21
Payer: MEDICARE

## 2021-04-21 PROCEDURE — 0651 HSPC ROUTINE HOME CARE

## 2021-04-22 ENCOUNTER — HOME CARE VISIT (OUTPATIENT)
Dept: HOSPICE | Facility: HOSPICE | Age: 76
End: 2021-04-22
Payer: MEDICARE

## 2021-04-22 PROCEDURE — 0651 HSPC ROUTINE HOME CARE

## 2021-04-23 ENCOUNTER — HOME CARE VISIT (OUTPATIENT)
Dept: SCHEDULING | Facility: HOME HEALTH | Age: 76
End: 2021-04-23
Payer: MEDICARE

## 2021-04-23 PROCEDURE — G0156 HHCP-SVS OF AIDE,EA 15 MIN: HCPCS

## 2021-04-23 PROCEDURE — 0651 HSPC ROUTINE HOME CARE

## 2021-04-24 PROCEDURE — 0651 HSPC ROUTINE HOME CARE

## 2021-04-25 PROCEDURE — 0651 HSPC ROUTINE HOME CARE

## 2021-04-26 PROCEDURE — 0651 HSPC ROUTINE HOME CARE

## 2021-04-27 ENCOUNTER — HOME CARE VISIT (OUTPATIENT)
Dept: SCHEDULING | Facility: HOME HEALTH | Age: 76
End: 2021-04-27
Payer: MEDICARE

## 2021-04-27 PROCEDURE — 0651 HSPC ROUTINE HOME CARE

## 2021-04-27 PROCEDURE — G0156 HHCP-SVS OF AIDE,EA 15 MIN: HCPCS

## 2021-04-28 PROCEDURE — 0651 HSPC ROUTINE HOME CARE

## 2021-04-29 ENCOUNTER — HOME CARE VISIT (OUTPATIENT)
Dept: SCHEDULING | Facility: HOME HEALTH | Age: 76
End: 2021-04-29
Payer: MEDICARE

## 2021-04-29 VITALS
TEMPERATURE: 97.8 F | OXYGEN SATURATION: 92 % | DIASTOLIC BLOOD PRESSURE: 70 MMHG | HEART RATE: 94 BPM | SYSTOLIC BLOOD PRESSURE: 130 MMHG | RESPIRATION RATE: 18 BRPM

## 2021-04-29 PROCEDURE — G0299 HHS/HOSPICE OF RN EA 15 MIN: HCPCS

## 2021-04-29 PROCEDURE — 0651 HSPC ROUTINE HOME CARE

## 2021-04-30 ENCOUNTER — HOME CARE VISIT (OUTPATIENT)
Dept: HOSPICE | Facility: HOSPICE | Age: 76
End: 2021-04-30
Payer: MEDICARE

## 2021-04-30 PROCEDURE — 0651 HSPC ROUTINE HOME CARE

## 2021-05-01 PROCEDURE — 0651 HSPC ROUTINE HOME CARE

## 2021-05-02 PROCEDURE — 0651 HSPC ROUTINE HOME CARE

## 2021-05-03 PROCEDURE — 0651 HSPC ROUTINE HOME CARE

## 2021-05-04 PROCEDURE — 0651 HSPC ROUTINE HOME CARE

## 2021-05-05 ENCOUNTER — HOME CARE VISIT (OUTPATIENT)
Dept: SCHEDULING | Facility: HOME HEALTH | Age: 76
End: 2021-05-05
Payer: MEDICARE

## 2021-05-05 PROCEDURE — 0651 HSPC ROUTINE HOME CARE

## 2021-05-05 PROCEDURE — G0299 HHS/HOSPICE OF RN EA 15 MIN: HCPCS

## 2021-05-06 VITALS
OXYGEN SATURATION: 92 % | DIASTOLIC BLOOD PRESSURE: 72 MMHG | RESPIRATION RATE: 20 BRPM | HEART RATE: 85 BPM | SYSTOLIC BLOOD PRESSURE: 124 MMHG | TEMPERATURE: 96.1 F

## 2021-05-06 PROCEDURE — 0651 HSPC ROUTINE HOME CARE

## 2021-05-07 ENCOUNTER — HOME CARE VISIT (OUTPATIENT)
Dept: SCHEDULING | Facility: HOME HEALTH | Age: 76
End: 2021-05-07
Payer: MEDICARE

## 2021-05-07 PROCEDURE — G0155 HHCP-SVS OF CSW,EA 15 MIN: HCPCS

## 2021-05-07 PROCEDURE — G0156 HHCP-SVS OF AIDE,EA 15 MIN: HCPCS

## 2021-05-07 PROCEDURE — 0651 HSPC ROUTINE HOME CARE

## 2021-05-08 PROCEDURE — 0651 HSPC ROUTINE HOME CARE

## 2021-05-09 PROCEDURE — 0651 HSPC ROUTINE HOME CARE

## 2021-05-10 PROCEDURE — 0651 HSPC ROUTINE HOME CARE

## 2021-05-11 ENCOUNTER — HOME CARE VISIT (OUTPATIENT)
Dept: SCHEDULING | Facility: HOME HEALTH | Age: 76
End: 2021-05-11
Payer: MEDICARE

## 2021-05-11 PROCEDURE — G0156 HHCP-SVS OF AIDE,EA 15 MIN: HCPCS

## 2021-05-11 PROCEDURE — 0651 HSPC ROUTINE HOME CARE

## 2021-05-12 PROCEDURE — 0651 HSPC ROUTINE HOME CARE

## 2021-05-13 PROCEDURE — 0651 HSPC ROUTINE HOME CARE

## 2021-05-14 ENCOUNTER — HOME CARE VISIT (OUTPATIENT)
Dept: SCHEDULING | Facility: HOME HEALTH | Age: 76
End: 2021-05-14
Payer: MEDICARE

## 2021-05-14 PROCEDURE — G0299 HHS/HOSPICE OF RN EA 15 MIN: HCPCS

## 2021-05-14 PROCEDURE — G0156 HHCP-SVS OF AIDE,EA 15 MIN: HCPCS

## 2021-05-14 PROCEDURE — 0651 HSPC ROUTINE HOME CARE

## 2021-05-15 VITALS
OXYGEN SATURATION: 96 % | DIASTOLIC BLOOD PRESSURE: 82 MMHG | TEMPERATURE: 97.6 F | RESPIRATION RATE: 20 BRPM | HEART RATE: 97 BPM | SYSTOLIC BLOOD PRESSURE: 120 MMHG

## 2021-05-15 PROCEDURE — 0651 HSPC ROUTINE HOME CARE

## 2021-05-16 PROCEDURE — 0651 HSPC ROUTINE HOME CARE

## 2021-05-17 PROCEDURE — 0651 HSPC ROUTINE HOME CARE

## 2021-05-18 ENCOUNTER — HOME CARE VISIT (OUTPATIENT)
Dept: SCHEDULING | Facility: HOME HEALTH | Age: 76
End: 2021-05-18
Payer: MEDICARE

## 2021-05-18 PROCEDURE — G0156 HHCP-SVS OF AIDE,EA 15 MIN: HCPCS

## 2021-05-18 PROCEDURE — 0651 HSPC ROUTINE HOME CARE

## 2021-05-19 PROCEDURE — 0651 HSPC ROUTINE HOME CARE

## 2021-05-20 PROCEDURE — 0651 HSPC ROUTINE HOME CARE

## 2021-05-21 ENCOUNTER — HOME CARE VISIT (OUTPATIENT)
Dept: SCHEDULING | Facility: HOME HEALTH | Age: 76
End: 2021-05-21
Payer: MEDICARE

## 2021-05-21 ENCOUNTER — HOME CARE VISIT (OUTPATIENT)
Dept: HOSPICE | Facility: HOSPICE | Age: 76
End: 2021-05-21
Payer: MEDICARE

## 2021-05-21 PROCEDURE — G0156 HHCP-SVS OF AIDE,EA 15 MIN: HCPCS

## 2021-05-21 PROCEDURE — G0299 HHS/HOSPICE OF RN EA 15 MIN: HCPCS

## 2021-05-21 PROCEDURE — 0651 HSPC ROUTINE HOME CARE

## 2021-05-22 PROCEDURE — 0651 HSPC ROUTINE HOME CARE

## 2021-05-23 PROCEDURE — 0651 HSPC ROUTINE HOME CARE

## 2021-05-24 PROCEDURE — 0651 HSPC ROUTINE HOME CARE

## 2021-05-25 ENCOUNTER — HOME CARE VISIT (OUTPATIENT)
Dept: SCHEDULING | Facility: HOME HEALTH | Age: 76
End: 2021-05-25
Payer: MEDICARE

## 2021-05-25 VITALS
HEART RATE: 82 BPM | TEMPERATURE: 97.6 F | OXYGEN SATURATION: 95 % | RESPIRATION RATE: 16 BRPM | SYSTOLIC BLOOD PRESSURE: 124 MMHG | DIASTOLIC BLOOD PRESSURE: 70 MMHG

## 2021-05-25 PROCEDURE — G0156 HHCP-SVS OF AIDE,EA 15 MIN: HCPCS

## 2021-05-25 PROCEDURE — 0651 HSPC ROUTINE HOME CARE

## 2021-05-25 NOTE — HOSPICE
Patient sitting in gerichair in living room upon arrival for visit; other residents and facility caregiver Susana present. Patient alert, denies pain. Oriented to self only. No respiratory difficulty observed on room air. Susana reports patient has not needed to use supplemental oxygen this week. Staff report patient is eating approximately 75% of meals. Staff report no skin breakdown. Korey Edmond, facility attending NP contacted; no needs at this time. Call made to patient's daughter Anna Banda following visit for update. Reinforced hospice 24/7 for any concerns or change in patient's condition. No medications or supplies needed.

## 2021-05-26 PROCEDURE — 0651 HSPC ROUTINE HOME CARE

## 2021-05-27 ENCOUNTER — HOME CARE VISIT (OUTPATIENT)
Dept: SCHEDULING | Facility: HOME HEALTH | Age: 76
End: 2021-05-27
Payer: MEDICARE

## 2021-05-27 VITALS
OXYGEN SATURATION: 92 % | RESPIRATION RATE: 16 BRPM | DIASTOLIC BLOOD PRESSURE: 60 MMHG | HEART RATE: 78 BPM | SYSTOLIC BLOOD PRESSURE: 102 MMHG

## 2021-05-27 PROCEDURE — G0299 HHS/HOSPICE OF RN EA 15 MIN: HCPCS

## 2021-05-27 PROCEDURE — 0651 HSPC ROUTINE HOME CARE

## 2021-05-28 ENCOUNTER — HOME CARE VISIT (OUTPATIENT)
Dept: SCHEDULING | Facility: HOME HEALTH | Age: 76
End: 2021-05-28
Payer: MEDICARE

## 2021-05-28 PROCEDURE — G0156 HHCP-SVS OF AIDE,EA 15 MIN: HCPCS

## 2021-05-28 PROCEDURE — 0651 HSPC ROUTINE HOME CARE

## 2021-05-29 PROCEDURE — 0651 HSPC ROUTINE HOME CARE

## 2021-05-30 PROCEDURE — 0651 HSPC ROUTINE HOME CARE

## 2021-05-31 ENCOUNTER — HOME CARE VISIT (OUTPATIENT)
Dept: HOSPICE | Facility: HOSPICE | Age: 76
End: 2021-05-31
Payer: MEDICARE

## 2021-05-31 PROCEDURE — 0651 HSPC ROUTINE HOME CARE

## 2021-06-01 ENCOUNTER — HOME CARE VISIT (OUTPATIENT)
Dept: SCHEDULING | Facility: HOME HEALTH | Age: 76
End: 2021-06-01
Payer: MEDICARE

## 2021-06-01 ENCOUNTER — HOME CARE VISIT (OUTPATIENT)
Dept: HOSPICE | Facility: HOSPICE | Age: 76
End: 2021-06-01
Payer: MEDICARE

## 2021-06-01 PROCEDURE — 0651 HSPC ROUTINE HOME CARE

## 2021-06-01 PROCEDURE — G0156 HHCP-SVS OF AIDE,EA 15 MIN: HCPCS

## 2021-06-01 PROCEDURE — G0299 HHS/HOSPICE OF RN EA 15 MIN: HCPCS

## 2021-06-02 ENCOUNTER — HOME CARE VISIT (OUTPATIENT)
Dept: HOSPICE | Facility: HOSPICE | Age: 76
End: 2021-06-02
Payer: MEDICARE

## 2021-06-02 PROCEDURE — 0651 HSPC ROUTINE HOME CARE

## 2021-06-03 VITALS
DIASTOLIC BLOOD PRESSURE: 60 MMHG | RESPIRATION RATE: 20 BRPM | HEART RATE: 91 BPM | SYSTOLIC BLOOD PRESSURE: 86 MMHG | TEMPERATURE: 97.3 F | OXYGEN SATURATION: 93 %

## 2021-06-03 PROCEDURE — 0651 HSPC ROUTINE HOME CARE

## 2021-06-04 ENCOUNTER — HOME CARE VISIT (OUTPATIENT)
Dept: SCHEDULING | Facility: HOME HEALTH | Age: 76
End: 2021-06-04
Payer: MEDICARE

## 2021-06-04 PROCEDURE — G0156 HHCP-SVS OF AIDE,EA 15 MIN: HCPCS

## 2021-06-04 PROCEDURE — 0651 HSPC ROUTINE HOME CARE

## 2021-06-05 PROCEDURE — 0651 HSPC ROUTINE HOME CARE

## 2021-06-06 PROCEDURE — 0651 HSPC ROUTINE HOME CARE

## 2021-06-07 PROCEDURE — 0651 HSPC ROUTINE HOME CARE

## 2021-06-08 ENCOUNTER — HOME CARE VISIT (OUTPATIENT)
Dept: SCHEDULING | Facility: HOME HEALTH | Age: 76
End: 2021-06-08
Payer: MEDICARE

## 2021-06-08 PROCEDURE — G0156 HHCP-SVS OF AIDE,EA 15 MIN: HCPCS

## 2021-06-08 PROCEDURE — 0651 HSPC ROUTINE HOME CARE

## 2021-06-09 PROCEDURE — 0651 HSPC ROUTINE HOME CARE

## 2021-06-10 ENCOUNTER — HOME CARE VISIT (OUTPATIENT)
Dept: SCHEDULING | Facility: HOME HEALTH | Age: 76
End: 2021-06-10
Payer: MEDICARE

## 2021-06-10 PROCEDURE — G0155 HHCP-SVS OF CSW,EA 15 MIN: HCPCS

## 2021-06-10 PROCEDURE — 0651 HSPC ROUTINE HOME CARE

## 2021-06-11 ENCOUNTER — HOME CARE VISIT (OUTPATIENT)
Dept: SCHEDULING | Facility: HOME HEALTH | Age: 76
End: 2021-06-11
Payer: MEDICARE

## 2021-06-11 VITALS
OXYGEN SATURATION: 91 % | SYSTOLIC BLOOD PRESSURE: 125 MMHG | DIASTOLIC BLOOD PRESSURE: 82 MMHG | HEART RATE: 82 BPM | RESPIRATION RATE: 20 BRPM

## 2021-06-11 PROCEDURE — HOSPICE MEDICATION HC HH HOSPICE MEDICATION

## 2021-06-11 PROCEDURE — 0651 HSPC ROUTINE HOME CARE

## 2021-06-11 PROCEDURE — G0156 HHCP-SVS OF AIDE,EA 15 MIN: HCPCS

## 2021-06-11 PROCEDURE — G0299 HHS/HOSPICE OF RN EA 15 MIN: HCPCS

## 2021-06-12 PROCEDURE — 0651 HSPC ROUTINE HOME CARE

## 2021-06-13 PROCEDURE — 0651 HSPC ROUTINE HOME CARE

## 2021-06-14 ENCOUNTER — HOME CARE VISIT (OUTPATIENT)
Dept: SCHEDULING | Facility: HOME HEALTH | Age: 76
End: 2021-06-14
Payer: MEDICARE

## 2021-06-14 VITALS
DIASTOLIC BLOOD PRESSURE: 52 MMHG | OXYGEN SATURATION: 97 % | SYSTOLIC BLOOD PRESSURE: 98 MMHG | HEART RATE: 90 BPM | RESPIRATION RATE: 16 BRPM

## 2021-06-14 PROCEDURE — G0299 HHS/HOSPICE OF RN EA 15 MIN: HCPCS

## 2021-06-14 PROCEDURE — 0651 HSPC ROUTINE HOME CARE

## 2021-06-15 ENCOUNTER — HOME CARE VISIT (OUTPATIENT)
Dept: SCHEDULING | Facility: HOME HEALTH | Age: 76
End: 2021-06-15
Payer: MEDICARE

## 2021-06-15 PROCEDURE — G0156 HHCP-SVS OF AIDE,EA 15 MIN: HCPCS

## 2021-06-15 PROCEDURE — 0651 HSPC ROUTINE HOME CARE

## 2021-06-16 PROCEDURE — 0651 HSPC ROUTINE HOME CARE

## 2021-06-17 PROCEDURE — 0651 HSPC ROUTINE HOME CARE

## 2021-06-18 ENCOUNTER — HOME CARE VISIT (OUTPATIENT)
Dept: SCHEDULING | Facility: HOME HEALTH | Age: 76
End: 2021-06-18
Payer: MEDICARE

## 2021-06-18 VITALS
HEART RATE: 74 BPM | DIASTOLIC BLOOD PRESSURE: 65 MMHG | OXYGEN SATURATION: 88 % | SYSTOLIC BLOOD PRESSURE: 102 MMHG | RESPIRATION RATE: 16 BRPM

## 2021-06-18 PROCEDURE — 0651 HSPC ROUTINE HOME CARE

## 2021-06-18 PROCEDURE — G0299 HHS/HOSPICE OF RN EA 15 MIN: HCPCS

## 2021-06-18 PROCEDURE — G0156 HHCP-SVS OF AIDE,EA 15 MIN: HCPCS

## 2021-06-19 PROCEDURE — 0651 HSPC ROUTINE HOME CARE

## 2021-06-20 PROCEDURE — 0651 HSPC ROUTINE HOME CARE

## 2021-06-21 PROCEDURE — 0651 HSPC ROUTINE HOME CARE

## 2021-06-22 ENCOUNTER — HOME CARE VISIT (OUTPATIENT)
Dept: SCHEDULING | Facility: HOME HEALTH | Age: 76
End: 2021-06-22
Payer: MEDICARE

## 2021-06-22 PROCEDURE — 0651 HSPC ROUTINE HOME CARE

## 2021-06-22 PROCEDURE — G0299 HHS/HOSPICE OF RN EA 15 MIN: HCPCS

## 2021-06-23 ENCOUNTER — HOME CARE VISIT (OUTPATIENT)
Dept: HOSPICE | Facility: HOSPICE | Age: 76
End: 2021-06-23
Payer: MEDICARE

## 2021-06-23 VITALS
HEART RATE: 82 BPM | OXYGEN SATURATION: 95 % | DIASTOLIC BLOOD PRESSURE: 74 MMHG | RESPIRATION RATE: 16 BRPM | SYSTOLIC BLOOD PRESSURE: 132 MMHG | TEMPERATURE: 97.9 F

## 2021-06-23 PROCEDURE — 0651 HSPC ROUTINE HOME CARE

## 2021-06-24 ENCOUNTER — HOME CARE VISIT (OUTPATIENT)
Dept: HOSPICE | Facility: HOSPICE | Age: 76
End: 2021-06-24
Payer: MEDICARE

## 2021-06-24 PROCEDURE — 0651 HSPC ROUTINE HOME CARE

## 2021-06-25 ENCOUNTER — HOME CARE VISIT (OUTPATIENT)
Dept: SCHEDULING | Facility: HOME HEALTH | Age: 76
End: 2021-06-25
Payer: MEDICARE

## 2021-06-25 PROCEDURE — 0651 HSPC ROUTINE HOME CARE

## 2021-06-25 PROCEDURE — G0156 HHCP-SVS OF AIDE,EA 15 MIN: HCPCS

## 2021-06-26 PROCEDURE — 0651 HSPC ROUTINE HOME CARE

## 2021-06-27 PROCEDURE — 0651 HSPC ROUTINE HOME CARE

## 2021-06-28 PROCEDURE — 0651 HSPC ROUTINE HOME CARE

## 2021-06-29 ENCOUNTER — HOME CARE VISIT (OUTPATIENT)
Dept: SCHEDULING | Facility: HOME HEALTH | Age: 76
End: 2021-06-29
Payer: MEDICARE

## 2021-06-29 PROCEDURE — 0651 HSPC ROUTINE HOME CARE

## 2021-06-29 PROCEDURE — G0156 HHCP-SVS OF AIDE,EA 15 MIN: HCPCS

## 2021-06-30 PROCEDURE — 0651 HSPC ROUTINE HOME CARE

## 2021-07-01 PROCEDURE — 0651 HSPC ROUTINE HOME CARE

## 2021-07-02 ENCOUNTER — HOME CARE VISIT (OUTPATIENT)
Dept: SCHEDULING | Facility: HOME HEALTH | Age: 76
End: 2021-07-02
Payer: MEDICARE

## 2021-07-02 PROCEDURE — 0651 HSPC ROUTINE HOME CARE

## 2021-07-02 PROCEDURE — G0299 HHS/HOSPICE OF RN EA 15 MIN: HCPCS

## 2021-07-02 PROCEDURE — G0156 HHCP-SVS OF AIDE,EA 15 MIN: HCPCS

## 2021-07-03 PROCEDURE — 0651 HSPC ROUTINE HOME CARE

## 2021-07-04 VITALS — HEART RATE: 83 BPM | RESPIRATION RATE: 16 BRPM | DIASTOLIC BLOOD PRESSURE: 64 MMHG | SYSTOLIC BLOOD PRESSURE: 120 MMHG

## 2021-07-04 PROCEDURE — HOSPICE MEDICATION HC HH HOSPICE MEDICATION

## 2021-07-04 PROCEDURE — 0651 HSPC ROUTINE HOME CARE

## 2021-07-05 PROCEDURE — 0651 HSPC ROUTINE HOME CARE

## 2021-07-06 ENCOUNTER — HOME CARE VISIT (OUTPATIENT)
Dept: SCHEDULING | Facility: HOME HEALTH | Age: 76
End: 2021-07-06
Payer: MEDICARE

## 2021-07-06 PROCEDURE — 0651 HSPC ROUTINE HOME CARE

## 2021-07-06 PROCEDURE — G0156 HHCP-SVS OF AIDE,EA 15 MIN: HCPCS

## 2021-07-07 PROCEDURE — 0651 HSPC ROUTINE HOME CARE

## 2021-07-08 ENCOUNTER — HOME CARE VISIT (OUTPATIENT)
Dept: SCHEDULING | Facility: HOME HEALTH | Age: 76
End: 2021-07-08
Payer: MEDICARE

## 2021-07-08 VITALS
OXYGEN SATURATION: 95 % | SYSTOLIC BLOOD PRESSURE: 142 MMHG | HEART RATE: 86 BPM | TEMPERATURE: 97.6 F | RESPIRATION RATE: 18 BRPM | DIASTOLIC BLOOD PRESSURE: 76 MMHG

## 2021-07-08 PROCEDURE — 0651 HSPC ROUTINE HOME CARE

## 2021-07-08 PROCEDURE — G0299 HHS/HOSPICE OF RN EA 15 MIN: HCPCS

## 2021-07-08 PROCEDURE — HOSPICE MEDICATION HC HH HOSPICE MEDICATION

## 2021-07-09 ENCOUNTER — HOME CARE VISIT (OUTPATIENT)
Dept: SCHEDULING | Facility: HOME HEALTH | Age: 76
End: 2021-07-09
Payer: MEDICARE

## 2021-07-09 PROCEDURE — G0156 HHCP-SVS OF AIDE,EA 15 MIN: HCPCS

## 2021-07-09 PROCEDURE — 0651 HSPC ROUTINE HOME CARE

## 2021-07-10 PROCEDURE — 0651 HSPC ROUTINE HOME CARE

## 2021-07-11 PROCEDURE — 0651 HSPC ROUTINE HOME CARE

## 2021-07-12 PROCEDURE — 0651 HSPC ROUTINE HOME CARE

## 2021-07-13 ENCOUNTER — HOME CARE VISIT (OUTPATIENT)
Dept: SCHEDULING | Facility: HOME HEALTH | Age: 76
End: 2021-07-13
Payer: MEDICARE

## 2021-07-13 PROCEDURE — G0156 HHCP-SVS OF AIDE,EA 15 MIN: HCPCS

## 2021-07-13 PROCEDURE — 0651 HSPC ROUTINE HOME CARE

## 2021-07-14 ENCOUNTER — HOME CARE VISIT (OUTPATIENT)
Dept: SCHEDULING | Facility: HOME HEALTH | Age: 76
End: 2021-07-14
Payer: MEDICARE

## 2021-07-14 PROCEDURE — G0156 HHCP-SVS OF AIDE,EA 15 MIN: HCPCS

## 2021-07-14 PROCEDURE — 0651 HSPC ROUTINE HOME CARE

## 2021-07-15 ENCOUNTER — HOME CARE VISIT (OUTPATIENT)
Dept: SCHEDULING | Facility: HOME HEALTH | Age: 76
End: 2021-07-15
Payer: MEDICARE

## 2021-07-15 PROCEDURE — G0155 HHCP-SVS OF CSW,EA 15 MIN: HCPCS

## 2021-07-15 PROCEDURE — 0651 HSPC ROUTINE HOME CARE

## 2021-07-16 ENCOUNTER — HOME CARE VISIT (OUTPATIENT)
Dept: HOSPICE | Facility: HOSPICE | Age: 76
End: 2021-07-16
Payer: MEDICARE

## 2021-07-16 VITALS
SYSTOLIC BLOOD PRESSURE: 150 MMHG | HEART RATE: 82 BPM | RESPIRATION RATE: 14 BRPM | DIASTOLIC BLOOD PRESSURE: 47 MMHG | TEMPERATURE: 98.2 F

## 2021-07-16 PROCEDURE — G0299 HHS/HOSPICE OF RN EA 15 MIN: HCPCS

## 2021-07-16 PROCEDURE — 0651 HSPC ROUTINE HOME CARE

## 2021-07-16 PROCEDURE — HOSPICE MEDICATION HC HH HOSPICE MEDICATION

## 2021-07-17 PROCEDURE — 0651 HSPC ROUTINE HOME CARE

## 2021-07-18 PROCEDURE — 0651 HSPC ROUTINE HOME CARE

## 2021-07-19 ENCOUNTER — HOME CARE VISIT (OUTPATIENT)
Dept: HOSPICE | Facility: HOSPICE | Age: 76
End: 2021-07-19
Payer: MEDICARE

## 2021-07-19 PROCEDURE — 0651 HSPC ROUTINE HOME CARE

## 2021-07-20 ENCOUNTER — HOME CARE VISIT (OUTPATIENT)
Dept: HOSPICE | Facility: HOSPICE | Age: 76
End: 2021-07-20
Payer: MEDICARE

## 2021-07-20 ENCOUNTER — HOME CARE VISIT (OUTPATIENT)
Dept: SCHEDULING | Facility: HOME HEALTH | Age: 76
End: 2021-07-20
Payer: MEDICARE

## 2021-07-20 PROCEDURE — G0156 HHCP-SVS OF AIDE,EA 15 MIN: HCPCS

## 2021-07-20 PROCEDURE — 0651 HSPC ROUTINE HOME CARE

## 2021-07-21 PROCEDURE — 0651 HSPC ROUTINE HOME CARE

## 2021-07-22 PROCEDURE — 0651 HSPC ROUTINE HOME CARE

## 2021-07-23 ENCOUNTER — HOME CARE VISIT (OUTPATIENT)
Dept: SCHEDULING | Facility: HOME HEALTH | Age: 76
End: 2021-07-23
Payer: MEDICARE

## 2021-07-23 PROCEDURE — G0156 HHCP-SVS OF AIDE,EA 15 MIN: HCPCS

## 2021-07-23 PROCEDURE — 0651 HSPC ROUTINE HOME CARE

## 2021-07-23 PROCEDURE — G0299 HHS/HOSPICE OF RN EA 15 MIN: HCPCS

## 2021-07-24 PROCEDURE — 0651 HSPC ROUTINE HOME CARE

## 2021-07-25 VITALS — SYSTOLIC BLOOD PRESSURE: 135 MMHG | RESPIRATION RATE: 16 BRPM | HEART RATE: 81 BPM | DIASTOLIC BLOOD PRESSURE: 75 MMHG

## 2021-07-25 PROCEDURE — 0651 HSPC ROUTINE HOME CARE

## 2021-07-25 PROCEDURE — HOSPICE MEDICATION HC HH HOSPICE MEDICATION

## 2021-07-25 NOTE — HOSPICE
Patient sitting in recliner. No pain reported. No shortness of breath. Staff reports patient eating 75 % of her meals and feeding herself. Visualized patient getting out of recliner and walking to front [facility] without assistance.     Amlodipine ordered from [facility]

## 2021-07-26 PROCEDURE — 0651 HSPC ROUTINE HOME CARE

## 2021-07-27 ENCOUNTER — HOME CARE VISIT (OUTPATIENT)
Dept: HOSPICE | Facility: HOSPICE | Age: 76
End: 2021-07-27
Payer: MEDICARE

## 2021-07-27 PROCEDURE — 0651 HSPC ROUTINE HOME CARE

## 2021-07-28 ENCOUNTER — HOME CARE VISIT (OUTPATIENT)
Dept: HOSPICE | Facility: HOSPICE | Age: 76
End: 2021-07-28
Payer: MEDICARE

## 2021-07-28 PROCEDURE — 0651 HSPC ROUTINE HOME CARE

## 2021-07-29 ENCOUNTER — HOME CARE VISIT (OUTPATIENT)
Dept: SCHEDULING | Facility: HOME HEALTH | Age: 76
End: 2021-07-29
Payer: MEDICARE

## 2021-07-29 ENCOUNTER — HOME CARE VISIT (OUTPATIENT)
Dept: HOSPICE | Facility: HOSPICE | Age: 76
End: 2021-07-29
Payer: MEDICARE

## 2021-07-29 PROCEDURE — 0651 HSPC ROUTINE HOME CARE

## 2021-07-29 PROCEDURE — G0299 HHS/HOSPICE OF RN EA 15 MIN: HCPCS

## 2021-07-30 ENCOUNTER — HOME CARE VISIT (OUTPATIENT)
Dept: SCHEDULING | Facility: HOME HEALTH | Age: 76
End: 2021-07-30
Payer: MEDICARE

## 2021-07-30 VITALS — HEART RATE: 77 BPM | RESPIRATION RATE: 20 BRPM | DIASTOLIC BLOOD PRESSURE: 60 MMHG | SYSTOLIC BLOOD PRESSURE: 102 MMHG

## 2021-07-30 PROCEDURE — 0651 HSPC ROUTINE HOME CARE

## 2021-07-30 PROCEDURE — G0156 HHCP-SVS OF AIDE,EA 15 MIN: HCPCS

## 2021-07-30 NOTE — HOSPICE
Patient rec'd in [facility] in living room with other residents watching TV. Alert, oriented x 1 (self). Assessment completed. Susana states patient reported right sided abdominal pain. Pt denies pain at this time. Will continue to monitor.   Patient continues to feed herself 50-75% of her meals    Supplies ordered: annette villanueva pull ups, wipes

## 2021-07-31 PROCEDURE — 0651 HSPC ROUTINE HOME CARE

## 2021-08-01 PROCEDURE — 0651 HSPC ROUTINE HOME CARE

## 2021-08-02 PROCEDURE — 0651 HSPC ROUTINE HOME CARE

## 2021-08-02 NOTE — HOSPICE
is visiting for a routine pastoral visit. Niesha Rodas was seated on the front porch at the time. She greeted the  and shared about her observations of the neighborhood and the animals she was watching. She shared briefly about when she painted and expressed creativity through art.  engaged her in dialogue, offered singing of hymns, and prayer. Niesha Rodas notes this interaction was nice and that prayers and familair hymns help her feel good.

## 2021-08-03 ENCOUNTER — HOME CARE VISIT (OUTPATIENT)
Dept: SCHEDULING | Facility: HOME HEALTH | Age: 76
End: 2021-08-03
Payer: MEDICARE

## 2021-08-03 PROCEDURE — G0156 HHCP-SVS OF AIDE,EA 15 MIN: HCPCS

## 2021-08-03 PROCEDURE — 0651 HSPC ROUTINE HOME CARE

## 2021-08-04 PROCEDURE — 0651 HSPC ROUTINE HOME CARE

## 2021-08-05 PROCEDURE — 0651 HSPC ROUTINE HOME CARE

## 2021-08-06 ENCOUNTER — HOME CARE VISIT (OUTPATIENT)
Dept: SCHEDULING | Facility: HOME HEALTH | Age: 76
End: 2021-08-06
Payer: MEDICARE

## 2021-08-06 PROCEDURE — G0156 HHCP-SVS OF AIDE,EA 15 MIN: HCPCS

## 2021-08-06 PROCEDURE — 0651 HSPC ROUTINE HOME CARE

## 2021-08-06 PROCEDURE — G0299 HHS/HOSPICE OF RN EA 15 MIN: HCPCS

## 2021-08-07 PROCEDURE — 0651 HSPC ROUTINE HOME CARE

## 2021-08-08 VITALS
DIASTOLIC BLOOD PRESSURE: 50 MMHG | HEART RATE: 78 BPM | OXYGEN SATURATION: 91 % | SYSTOLIC BLOOD PRESSURE: 112 MMHG | RESPIRATION RATE: 16 BRPM

## 2021-08-08 PROCEDURE — 0651 HSPC ROUTINE HOME CARE

## 2021-08-08 NOTE — HOSPICE
Joint visit made with Asberry Mortimer, NP  Patient rec'd in [facility]. Daughter present  Pt states no pain or shortness of breath. Staff reports she feeds herself % of meals. She is continent of bladder and bowel 75% of the time. She is very talkative today. Staff reports she has been having significant nose bleeds. Kisha assess pt nares. Rec'd order for saline nasal spray as needed for dry nose.     Amlodipine and synthroid ordered from [address] [place] [address] [address]:  Consulted AT MD to report change in patient status: YES  Obtained Order from Provider for initiation of Symptom relief medication / other medication needed:YES   Documentation completed in Telephone/Visit Note in [facility]  Reason medication is being initiated: Dry nose  MD / Provider name consulted re: change in status / initiation of new medication: Asberry Mortimer, NP  New Symptom(s): nose bleeds r/t dry nose  New Order(s): Saline nasal spray prn  Name of person being taught: indra Penn  Instructions given: YES  Side Effects taught:YES  Response to teaching: indra Penn, verbalizes understanding

## 2021-08-09 PROCEDURE — HOSPICE MEDICATION HC HH HOSPICE MEDICATION

## 2021-08-09 PROCEDURE — 0651 HSPC ROUTINE HOME CARE

## 2021-08-10 ENCOUNTER — HOME CARE VISIT (OUTPATIENT)
Dept: SCHEDULING | Facility: HOME HEALTH | Age: 76
End: 2021-08-10
Payer: MEDICARE

## 2021-08-10 PROCEDURE — 0651 HSPC ROUTINE HOME CARE

## 2021-08-10 PROCEDURE — G0156 HHCP-SVS OF AIDE,EA 15 MIN: HCPCS

## 2021-08-11 PROCEDURE — 0651 HSPC ROUTINE HOME CARE

## 2021-08-12 ENCOUNTER — HOME CARE VISIT (OUTPATIENT)
Dept: SCHEDULING | Facility: HOME HEALTH | Age: 76
End: 2021-08-12
Payer: MEDICARE

## 2021-08-12 PROCEDURE — 0651 HSPC ROUTINE HOME CARE

## 2021-08-12 PROCEDURE — G0299 HHS/HOSPICE OF RN EA 15 MIN: HCPCS

## 2021-08-13 ENCOUNTER — HOME CARE VISIT (OUTPATIENT)
Dept: SCHEDULING | Facility: HOME HEALTH | Age: 76
End: 2021-08-13
Payer: MEDICARE

## 2021-08-13 VITALS
SYSTOLIC BLOOD PRESSURE: 130 MMHG | RESPIRATION RATE: 20 BRPM | HEART RATE: 76 BPM | TEMPERATURE: 96.6 F | OXYGEN SATURATION: 93 % | DIASTOLIC BLOOD PRESSURE: 62 MMHG

## 2021-08-13 PROCEDURE — G0156 HHCP-SVS OF AIDE,EA 15 MIN: HCPCS

## 2021-08-13 PROCEDURE — 0651 HSPC ROUTINE HOME CARE

## 2021-08-13 NOTE — HOSPICE
Patient received sitting up in chair in tv area awake and alert  No S/S of pain or shortness of breath on room air  patient has prn 02 available    Patient denies having any discomfort or shortness of breath currently    Caregiver, Susana reports no new issues recently   To continue with current hospice plan of care  Informed Cathy Mo to call hospice with concerns / needs  Understanding verbalized

## 2021-08-14 PROCEDURE — 0651 HSPC ROUTINE HOME CARE

## 2021-08-15 PROCEDURE — 0651 HSPC ROUTINE HOME CARE

## 2021-08-16 PROCEDURE — 0651 HSPC ROUTINE HOME CARE

## 2021-08-17 ENCOUNTER — HOME CARE VISIT (OUTPATIENT)
Dept: SCHEDULING | Facility: HOME HEALTH | Age: 76
End: 2021-08-17
Payer: MEDICARE

## 2021-08-17 PROCEDURE — 0651 HSPC ROUTINE HOME CARE

## 2021-08-17 PROCEDURE — G0156 HHCP-SVS OF AIDE,EA 15 MIN: HCPCS

## 2021-08-18 ENCOUNTER — HOME CARE VISIT (OUTPATIENT)
Dept: HOSPICE | Facility: HOSPICE | Age: 76
End: 2021-08-18
Payer: MEDICARE

## 2021-08-18 PROCEDURE — 0651 HSPC ROUTINE HOME CARE

## 2021-08-19 PROCEDURE — 0651 HSPC ROUTINE HOME CARE

## 2021-08-20 ENCOUNTER — HOME CARE VISIT (OUTPATIENT)
Dept: SCHEDULING | Facility: HOME HEALTH | Age: 76
End: 2021-08-20
Payer: MEDICARE

## 2021-08-20 VITALS
DIASTOLIC BLOOD PRESSURE: 60 MMHG | OXYGEN SATURATION: 93 % | HEART RATE: 83 BPM | RESPIRATION RATE: 14 BRPM | SYSTOLIC BLOOD PRESSURE: 118 MMHG

## 2021-08-20 PROCEDURE — 0651 HSPC ROUTINE HOME CARE

## 2021-08-20 PROCEDURE — HOSPICE MEDICATION HC HH HOSPICE MEDICATION

## 2021-08-20 PROCEDURE — G0299 HHS/HOSPICE OF RN EA 15 MIN: HCPCS

## 2021-08-20 PROCEDURE — G0155 HHCP-SVS OF CSW,EA 15 MIN: HCPCS

## 2021-08-20 PROCEDURE — G0156 HHCP-SVS OF AIDE,EA 15 MIN: HCPCS

## 2021-08-20 NOTE — HOSPICE
Patient sitting in gerichair. No pain reported. Alert and oriented x 1  Assessment completed  Medications rev'd with Susana.    Lisinopril, Amlodipine, and Syntroid [place]  kay, gamaliel pull ups, wipes ordered from [facility]

## 2021-08-21 PROCEDURE — 0651 HSPC ROUTINE HOME CARE

## 2021-08-22 PROCEDURE — 0651 HSPC ROUTINE HOME CARE

## 2021-08-23 PROCEDURE — 0651 HSPC ROUTINE HOME CARE

## 2021-08-24 ENCOUNTER — HOME CARE VISIT (OUTPATIENT)
Dept: SCHEDULING | Facility: HOME HEALTH | Age: 76
End: 2021-08-24
Payer: MEDICARE

## 2021-08-24 ENCOUNTER — HOME CARE VISIT (OUTPATIENT)
Dept: HOSPICE | Facility: HOSPICE | Age: 76
End: 2021-08-24
Payer: MEDICARE

## 2021-08-24 PROCEDURE — G0156 HHCP-SVS OF AIDE,EA 15 MIN: HCPCS

## 2021-08-24 PROCEDURE — 0651 HSPC ROUTINE HOME CARE

## 2022-01-01 ENCOUNTER — HOME CARE VISIT (OUTPATIENT)
Dept: SCHEDULING | Facility: HOME HEALTH | Age: 77
End: 2022-01-01
Payer: MEDICARE

## 2022-01-01 ENCOUNTER — HOME CARE VISIT (OUTPATIENT)
Dept: HOSPICE | Facility: HOSPICE | Age: 77
End: 2022-01-01
Payer: MEDICARE

## 2022-01-01 ENCOUNTER — HOSPICE ADMISSION (OUTPATIENT)
Dept: HOSPICE | Facility: HOSPICE | Age: 77
End: 2022-01-01
Payer: MEDICARE

## 2022-01-01 ENCOUNTER — HOSPITAL ENCOUNTER (EMERGENCY)
Age: 77
Discharge: HOME | End: 2022-07-14
Attending: EMERGENCY MEDICINE
Payer: MEDICARE

## 2022-01-01 ENCOUNTER — APPOINTMENT (OUTPATIENT)
Dept: CT IMAGING | Age: 77
End: 2022-01-01
Attending: EMERGENCY MEDICINE
Payer: MEDICARE

## 2022-01-01 ENCOUNTER — APPOINTMENT (OUTPATIENT)
Dept: GENERAL RADIOLOGY | Age: 77
End: 2022-01-01
Attending: EMERGENCY MEDICINE
Payer: MEDICARE

## 2022-01-01 VITALS
OXYGEN SATURATION: 95 % | DIASTOLIC BLOOD PRESSURE: 65 MMHG | RESPIRATION RATE: 14 BRPM | HEART RATE: 103 BPM | SYSTOLIC BLOOD PRESSURE: 96 MMHG

## 2022-01-01 VITALS
HEART RATE: 87 BPM | DIASTOLIC BLOOD PRESSURE: 75 MMHG | OXYGEN SATURATION: 93 % | SYSTOLIC BLOOD PRESSURE: 130 MMHG | RESPIRATION RATE: 16 BRPM

## 2022-01-01 VITALS
OXYGEN SATURATION: 94 % | SYSTOLIC BLOOD PRESSURE: 135 MMHG | HEART RATE: 91 BPM | DIASTOLIC BLOOD PRESSURE: 80 MMHG | RESPIRATION RATE: 16 BRPM

## 2022-01-01 VITALS
RESPIRATION RATE: 18 BRPM | SYSTOLIC BLOOD PRESSURE: 157 MMHG | DIASTOLIC BLOOD PRESSURE: 87 MMHG | TEMPERATURE: 98.1 F | HEART RATE: 85 BPM | OXYGEN SATURATION: 98 %

## 2022-01-01 VITALS — DIASTOLIC BLOOD PRESSURE: 80 MMHG | SYSTOLIC BLOOD PRESSURE: 150 MMHG

## 2022-01-01 VITALS
SYSTOLIC BLOOD PRESSURE: 153 MMHG | SYSTOLIC BLOOD PRESSURE: 114 MMHG | RESPIRATION RATE: 18 BRPM | OXYGEN SATURATION: 90 % | DIASTOLIC BLOOD PRESSURE: 76 MMHG | HEART RATE: 80 BPM | RESPIRATION RATE: 18 BRPM | HEART RATE: 100 BPM | TEMPERATURE: 97.6 F | TEMPERATURE: 98.8 F | DIASTOLIC BLOOD PRESSURE: 79 MMHG

## 2022-01-01 VITALS
OXYGEN SATURATION: 93 % | DIASTOLIC BLOOD PRESSURE: 80 MMHG | HEART RATE: 81 BPM | TEMPERATURE: 97 F | SYSTOLIC BLOOD PRESSURE: 132 MMHG

## 2022-01-01 VITALS
SYSTOLIC BLOOD PRESSURE: 135 MMHG | DIASTOLIC BLOOD PRESSURE: 80 MMHG | RESPIRATION RATE: 16 BRPM | HEART RATE: 93 BPM | OXYGEN SATURATION: 93 %

## 2022-01-01 VITALS
DIASTOLIC BLOOD PRESSURE: 68 MMHG | TEMPERATURE: 97.7 F | HEART RATE: 90 BPM | SYSTOLIC BLOOD PRESSURE: 126 MMHG | RESPIRATION RATE: 20 BRPM

## 2022-01-01 VITALS
DIASTOLIC BLOOD PRESSURE: 72 MMHG | RESPIRATION RATE: 18 BRPM | HEART RATE: 102 BPM | TEMPERATURE: 97.7 F | SYSTOLIC BLOOD PRESSURE: 132 MMHG

## 2022-01-01 VITALS
SYSTOLIC BLOOD PRESSURE: 130 MMHG | DIASTOLIC BLOOD PRESSURE: 66 MMHG | OXYGEN SATURATION: 96 % | RESPIRATION RATE: 16 BRPM | HEART RATE: 98 BPM

## 2022-01-01 VITALS
RESPIRATION RATE: 16 BRPM | DIASTOLIC BLOOD PRESSURE: 100 MMHG | OXYGEN SATURATION: 94 % | SYSTOLIC BLOOD PRESSURE: 190 MMHG | HEART RATE: 81 BPM

## 2022-01-01 VITALS
RESPIRATION RATE: 18 BRPM | SYSTOLIC BLOOD PRESSURE: 120 MMHG | TEMPERATURE: 97.5 F | OXYGEN SATURATION: 91 % | HEART RATE: 98 BPM | DIASTOLIC BLOOD PRESSURE: 97 MMHG

## 2022-01-01 VITALS
SYSTOLIC BLOOD PRESSURE: 102 MMHG | DIASTOLIC BLOOD PRESSURE: 58 MMHG | TEMPERATURE: 96.6 F | HEART RATE: 92 BPM | RESPIRATION RATE: 16 BRPM

## 2022-01-01 VITALS
DIASTOLIC BLOOD PRESSURE: 80 MMHG | OXYGEN SATURATION: 92 % | SYSTOLIC BLOOD PRESSURE: 140 MMHG | RESPIRATION RATE: 16 BRPM | HEART RATE: 96 BPM

## 2022-01-01 VITALS
DIASTOLIC BLOOD PRESSURE: 50 MMHG | OXYGEN SATURATION: 92 % | HEART RATE: 80 BPM | TEMPERATURE: 97.8 F | SYSTOLIC BLOOD PRESSURE: 96 MMHG | RESPIRATION RATE: 16 BRPM

## 2022-01-01 VITALS
DIASTOLIC BLOOD PRESSURE: 82 MMHG | TEMPERATURE: 97.5 F | HEART RATE: 108 BPM | RESPIRATION RATE: 18 BRPM | SYSTOLIC BLOOD PRESSURE: 118 MMHG

## 2022-01-01 VITALS
SYSTOLIC BLOOD PRESSURE: 110 MMHG | OXYGEN SATURATION: 98 % | HEART RATE: 106 BPM | DIASTOLIC BLOOD PRESSURE: 62 MMHG | RESPIRATION RATE: 16 BRPM

## 2022-01-01 DIAGNOSIS — G30.1 LATE ONSET ALZHEIMER'S DEMENTIA WITH BEHAVIORAL DISTURBANCE (HCC): Primary | ICD-10-CM

## 2022-01-01 DIAGNOSIS — F02.818 LATE ONSET ALZHEIMER'S DEMENTIA WITH BEHAVIORAL DISTURBANCE (HCC): Primary | ICD-10-CM

## 2022-01-01 DIAGNOSIS — R41.82 ALTERED MENTAL STATUS, UNSPECIFIED ALTERED MENTAL STATUS TYPE: ICD-10-CM

## 2022-01-01 LAB
ALBUMIN SERPL-MCNC: 3 G/DL (ref 3.5–5)
ALBUMIN/GLOB SERPL: 0.9 {RATIO} (ref 1.1–2.2)
ALP SERPL-CCNC: 88 U/L (ref 45–117)
ALT SERPL-CCNC: 16 U/L (ref 12–78)
ANION GAP SERPL CALC-SCNC: 6 MMOL/L (ref 5–15)
AST SERPL-CCNC: 12 U/L (ref 15–37)
BASE DEFICIT BLD-SCNC: 0.7 MMOL/L
BASE DEFICIT BLD-SCNC: 1.9 MMOL/L
BASOPHILS # BLD: 0.1 K/UL (ref 0–0.1)
BASOPHILS NFR BLD: 0 % (ref 0–1)
BILIRUB SERPL-MCNC: 0.5 MG/DL (ref 0.2–1)
BUN SERPL-MCNC: 23 MG/DL (ref 6–20)
BUN/CREAT SERPL: 16 (ref 12–20)
CA-I BLD-MCNC: 1.23 MMOL/L (ref 1.12–1.32)
CA-I BLD-MCNC: 1.23 MMOL/L (ref 1.12–1.32)
CALCIUM SERPL-MCNC: 9.1 MG/DL (ref 8.5–10.1)
CHLORIDE BLD-SCNC: 107 MMOL/L (ref 100–108)
CHLORIDE BLD-SCNC: 108 MMOL/L (ref 100–108)
CHLORIDE SERPL-SCNC: 106 MMOL/L (ref 97–108)
CO2 BLD-SCNC: 25 MMOL/L (ref 19–24)
CO2 BLD-SCNC: 26 MMOL/L (ref 19–24)
CO2 SERPL-SCNC: 28 MMOL/L (ref 21–32)
CREAT SERPL-MCNC: 1.47 MG/DL (ref 0.55–1.02)
CREAT UR-MCNC: 1.2 MG/DL (ref 0.6–1.3)
CREAT UR-MCNC: 1.4 MG/DL (ref 0.6–1.3)
DIFFERENTIAL METHOD BLD: ABNORMAL
EOSINOPHIL # BLD: 0.1 K/UL (ref 0–0.4)
EOSINOPHIL NFR BLD: 1 % (ref 0–7)
ERYTHROCYTE [DISTWIDTH] IN BLOOD BY AUTOMATED COUNT: 14.8 % (ref 11.5–14.5)
GLOBULIN SER CALC-MCNC: 3.5 G/DL (ref 2–4)
GLUCOSE BLD STRIP.AUTO-MCNC: 120 MG/DL (ref 74–106)
GLUCOSE BLD STRIP.AUTO-MCNC: 86 MG/DL (ref 74–106)
GLUCOSE SERPL-MCNC: 206 MG/DL (ref 65–100)
HCO3 BLDA-SCNC: 25 MMOL/L
HCO3 BLDA-SCNC: 26 MMOL/L
HCT VFR BLD AUTO: 42.6 % (ref 35–47)
HGB BLD-MCNC: 13.3 G/DL (ref 11.5–16)
IMM GRANULOCYTES # BLD AUTO: 0.1 K/UL (ref 0–0.04)
IMM GRANULOCYTES NFR BLD AUTO: 0 % (ref 0–0.5)
LACTATE BLD-SCNC: 1.32 MMOL/L (ref 0.4–2)
LACTATE BLD-SCNC: 1.95 MMOL/L (ref 0.4–2)
LYMPHOCYTES # BLD: 1.2 K/UL (ref 0.8–3.5)
LYMPHOCYTES NFR BLD: 10 % (ref 12–49)
MCH RBC QN AUTO: 28.5 PG (ref 26–34)
MCHC RBC AUTO-ENTMCNC: 31.2 G/DL (ref 30–36.5)
MCV RBC AUTO: 91.2 FL (ref 80–99)
MONOCYTES # BLD: 0.7 K/UL (ref 0–1)
MONOCYTES NFR BLD: 6 % (ref 5–13)
NEUTS SEG # BLD: 9.8 K/UL (ref 1.8–8)
NEUTS SEG NFR BLD: 83 % (ref 32–75)
NRBC # BLD: 0 K/UL (ref 0–0.01)
NRBC BLD-RTO: 0 PER 100 WBC
PCO2 BLDV: 47.2 MMHG (ref 41–51)
PCO2 BLDV: 48.1 MMHG (ref 41–51)
PH BLDV: 7.33 [PH] (ref 7.32–7.42)
PH BLDV: 7.34 [PH] (ref 7.32–7.42)
PLATELET # BLD AUTO: 255 K/UL (ref 150–400)
PMV BLD AUTO: 11.1 FL (ref 8.9–12.9)
PO2 BLDV: 38 MMHG (ref 25–40)
PO2 BLDV: 38 MMHG (ref 25–40)
POTASSIUM BLD-SCNC: 3.8 MMOL/L (ref 3.5–5.5)
POTASSIUM BLD-SCNC: 3.8 MMOL/L (ref 3.5–5.5)
POTASSIUM SERPL-SCNC: 3.8 MMOL/L (ref 3.5–5.1)
PROCALCITONIN SERPL-MCNC: <0.05 NG/ML
PROT SERPL-MCNC: 6.5 G/DL (ref 6.4–8.2)
RBC # BLD AUTO: 4.67 M/UL (ref 3.8–5.2)
SERVICE CMNT-IMP: 25
SODIUM BLD-SCNC: 143 MMOL/L (ref 136–145)
SODIUM BLD-SCNC: 144 MMOL/L (ref 136–145)
SODIUM SERPL-SCNC: 140 MMOL/L (ref 136–145)
SPECIMEN SITE: ABNORMAL
SPECIMEN SITE: ABNORMAL
TROPONIN-HIGH SENSITIVITY: 24 NG/L (ref 0–51)
WBC # BLD AUTO: 11.8 K/UL (ref 3.6–11)

## 2022-01-01 PROCEDURE — 0651 HSPC ROUTINE HOME CARE

## 2022-01-01 PROCEDURE — G0299 HHS/HOSPICE OF RN EA 15 MIN: HCPCS

## 2022-01-01 PROCEDURE — G0156 HHCP-SVS OF AIDE,EA 15 MIN: HCPCS

## 2022-01-01 PROCEDURE — 84484 ASSAY OF TROPONIN QUANT: CPT

## 2022-01-01 PROCEDURE — 71045 X-RAY EXAM CHEST 1 VIEW: CPT

## 2022-01-01 PROCEDURE — 70450 CT HEAD/BRAIN W/O DYE: CPT

## 2022-01-01 PROCEDURE — G0155 HHCP-SVS OF CSW,EA 15 MIN: HCPCS

## 2022-01-01 PROCEDURE — 85025 COMPLETE CBC W/AUTO DIFF WBC: CPT

## 2022-01-01 PROCEDURE — 82947 ASSAY GLUCOSE BLOOD QUANT: CPT

## 2022-01-01 PROCEDURE — 36415 COLL VENOUS BLD VENIPUNCTURE: CPT

## 2022-01-01 PROCEDURE — 74011250636 HC RX REV CODE- 250/636: Performed by: EMERGENCY MEDICINE

## 2022-01-01 PROCEDURE — 84132 ASSAY OF SERUM POTASSIUM: CPT

## 2022-01-01 PROCEDURE — HOSPICE MEDICATION HC HH HOSPICE MEDICATION

## 2022-01-01 PROCEDURE — 80053 COMPREHEN METABOLIC PANEL: CPT

## 2022-01-01 PROCEDURE — 71250 CT THORAX DX C-: CPT

## 2022-01-01 PROCEDURE — 99284 EMERGENCY DEPT VISIT MOD MDM: CPT

## 2022-01-01 PROCEDURE — 84145 PROCALCITONIN (PCT): CPT

## 2022-01-01 PROCEDURE — 3336500001 HSPC ELECTION

## 2022-01-01 RX ADMIN — SODIUM CHLORIDE 1000 ML: 9 INJECTION, SOLUTION INTRAVENOUS at 12:44

## 2022-07-14 NOTE — ED PROVIDER NOTES
EMERGENCY DEPARTMENT HISTORY AND PHYSICAL EXAM      Date: 7/14/2022  Patient Name: Yovani Pollard    History of Presenting Illness     Chief Complaint   Patient presents with    Syncope     glf syncope on front porch at residence. bg 221, 160 sbp per ems, unkn baseline, unkn hx,  unkn head strike, unkn thinners, unkn loc. .. pt is a&ox1        History Provided By: Patient    HPI: Yovani Pollard, 64 y.o. female with a past medical history significant for Coronary disease, dementia, history of CHF, anxiety, depression presents to the ED with cc of worsening mental status, possible syncopal episode while at his assisted living facility. Patient reportedly has been declining over the last several months and has very advanced dementia. At one point she was on hospice but was taken off that after she had a temporary improvement 6 to 7 months ago. This decline seems to have reversed itself. History severely limited due to patient's altered mental status. There is been no change in medications. No other associated symptoms. No other exacerbating or mounting factors. There are no other complaints, changes, or physical findings at this time. PCP: Azalia Palm MD    No current facility-administered medications on file prior to encounter. Current Outpatient Medications on File Prior to Encounter   Medication Sig Dispense Refill    sodium chloride (LITTLE REMEDIES) 0.9 % nasal spray bottle 1 [place] by Both Nostrils route as needed for Nasal Dryness.  acetaminophen (TYLENOL) 325 mg tablet Take 650 mg by mouth every six (6) hours as needed for Fever or Pain.  OXYGEN-AIR DELIVERY SYSTEMS 2 L/min by IntraNASal route as needed (comfort).  morphine (ROXANOL) 100 mg/5 mL (20 mg/mL) concentrated solution Take 5 mg by mouth every three (3) hours as needed for Pain or Shortness of Breath.  bisacodyL (DULCOLAX) 10 mg supp Insert 10 mg into rectum daily as needed for Constipation.       acetaminophen (TYLENOL) 650 mg suppository Insert 650 mg into rectum every six (6) hours as needed for Fever.  hyoscyamine SL (LEVSIN/SL) 0.125 mg SL tablet Take 0.125 mg by mouth every four (4) hours as needed for Secretions.  LORazepam (INTENSOL) 2 mg/mL concentrated solution Take 0.5 mg by mouth every six (6) hours as needed for Anxiety.  atorvastatin (LIPITOR) 20 mg tablet Take 1 Tab by mouth daily. 30 Tab 0    levothyroxine (SYNTHROID) 100 mcg tablet Take 100 mcg by mouth Daily (before breakfast). thyroid      amLODIPine (NORVASC) 5 mg tablet Take 5 mg by mouth daily. HTN       latanoprost (XALATAN) 0.005 % ophthalmic solution Administer 1 Drop to both eyes nightly. glaucoma      lisinopril (PRINIVIL, ZESTRIL) 20 mg tablet Take 20 mg by mouth daily. HTN          Past History     Past Medical History:  Past Medical History:   Diagnosis Date    CAD (coronary artery disease)     high cholesterol    Dementia (HCC)     Endocrine disease     thyroid    Heart failure (HCC)     MI    Psychiatric disorder     anxiety, depression       Past Surgical History:  Past Surgical History:   Procedure Laterality Date    HX GYN      2 c sections    HX HEENT      tonsilectomy       Family History:  Family History   Problem Relation Age of Onset    Hypertension Mother     Diabetes Mother     Emphysema Father     Asthma Daughter        Social History:  Social History     Tobacco Use    Smoking status: Current Every Day Smoker     Packs/day: 0.50     Years: 10.00     Pack years: 5.00    Smokeless tobacco: Former User   Substance Use Topics    Alcohol use: No    Drug use: No       Allergies:   Allergies   Allergen Reactions    Augmentin [Amoxicillin-Pot Clavulanate] Nausea Only     Abdominal pain    Belladonna Alkaloids Swelling     Swelling of tongue    Carbocaine [Mepivacaine] Other (comments)     Chest pain    Cortisone Other (comments)     depression    Decadron [Dexamethasone] Other (comments)     Altered mental status    Erythromycin Nausea Only    Keppra [Levetiracetam] Unknown (comments)    Meprobamate Swelling     tongue    Paxil [Paroxetine Hcl] Other (comments)     Headache, syncope    Vantin [Cefpodoxime] Unknown (comments)    Vibramycin (Calcium) Nausea Only    Zoloft [Sertraline] Other (comments)     Headache, syncope         Review of Systems   Review of Systems   Unable to perform ROS: Dementia       Physical Exam   Physical Exam  Vitals and nursing note reviewed. Constitutional:       General: She is not in acute distress. Appearance: She is well-developed. She is not ill-appearing. HENT:      Head: Normocephalic and atraumatic. Mouth/Throat:      Mouth: Mucous membranes are dry. Pharynx: No oropharyngeal exudate. Eyes:      Conjunctiva/sclera: Conjunctivae normal.      Pupils: Pupils are equal, round, and reactive to light. Cardiovascular:      Rate and Rhythm: Normal rate and regular rhythm. Heart sounds: No murmur heard. Pulmonary:      Effort: Pulmonary effort is normal. No respiratory distress. Breath sounds: Normal breath sounds. No wheezing. Abdominal:      General: Bowel sounds are normal. There is no distension. Palpations: Abdomen is soft. Tenderness: There is no abdominal tenderness. Musculoskeletal:         General: No deformity. Normal range of motion. Cervical back: Normal range of motion. Skin:     General: Skin is warm and dry. Findings: No rash. Neurological:      Mental Status: She is alert and oriented to person, place, and time.       Coordination: Coordination normal.   Psychiatric:         Behavior: Behavior normal.         Diagnostic Study Results     Labs -     Recent Results (from the past 24 hour(s))   CBC WITH AUTOMATED DIFF    Collection Time: 07/14/22 11:48 AM   Result Value Ref Range    WBC 11.8 (H) 3.6 - 11.0 K/uL    RBC 4.67 3.80 - 5.20 M/uL    HGB 13.3 11.5 - 16.0 g/dL    HCT 42.6 35.0 - 47.0 %    MCV 91.2 80.0 - 99.0 FL    MCH 28.5 26.0 - 34.0 PG    MCHC 31.2 30.0 - 36.5 g/dL    RDW 14.8 (H) 11.5 - 14.5 %    PLATELET 846 665 - 386 K/uL    MPV 11.1 8.9 - 12.9 FL    NRBC 0.0 0  WBC    ABSOLUTE NRBC 0.00 0.00 - 0.01 K/uL    NEUTROPHILS 83 (H) 32 - 75 %    LYMPHOCYTES 10 (L) 12 - 49 %    MONOCYTES 6 5 - 13 %    EOSINOPHILS 1 0 - 7 %    BASOPHILS 0 0 - 1 %    IMMATURE GRANULOCYTES 0 0.0 - 0.5 %    ABS. NEUTROPHILS 9.8 (H) 1.8 - 8.0 K/UL    ABS. LYMPHOCYTES 1.2 0.8 - 3.5 K/UL    ABS. MONOCYTES 0.7 0.0 - 1.0 K/UL    ABS. EOSINOPHILS 0.1 0.0 - 0.4 K/UL    ABS. BASOPHILS 0.1 0.0 - 0.1 K/UL    ABS. IMM. GRANS. 0.1 (H) 0.00 - 0.04 K/UL    DF AUTOMATED     METABOLIC PANEL, COMPREHENSIVE    Collection Time: 07/14/22 11:48 AM   Result Value Ref Range    Sodium 140 136 - 145 mmol/L    Potassium 3.8 3.5 - 5.1 mmol/L    Chloride 106 97 - 108 mmol/L    CO2 28 21 - 32 mmol/L    Anion gap 6 5 - 15 mmol/L    Glucose 206 (H) 65 - 100 mg/dL    BUN 23 (H) 6 - 20 MG/DL    Creatinine 1.47 (H) 0.55 - 1.02 MG/DL    BUN/Creatinine ratio 16 12 - 20      GFR est AA 42 (L) >60 ml/min/1.73m2    GFR est non-AA 34 (L) >60 ml/min/1.73m2    Calcium 9.1 8.5 - 10.1 MG/DL    Bilirubin, total 0.5 0.2 - 1.0 MG/DL    ALT (SGPT) 16 12 - 78 U/L    AST (SGOT) 12 (L) 15 - 37 U/L    Alk.  phosphatase 88 45 - 117 U/L    Protein, total 6.5 6.4 - 8.2 g/dL    Albumin 3.0 (L) 3.5 - 5.0 g/dL    Globulin 3.5 2.0 - 4.0 g/dL    A-G Ratio 0.9 (L) 1.1 - 2.2     TROPONIN-HIGH SENSITIVITY    Collection Time: 07/14/22 11:48 AM   Result Value Ref Range    Troponin-High Sensitivity 24 0 - 51 ng/L   BLOOD GAS,CHEM8,LACTIC ACID POC    Collection Time: 07/14/22  1:44 PM   Result Value Ref Range    Calcium, ionized (POC) 1.23 1.12 - 1.32 mmol/L    BICARBONATE 25 mmol/L    Base deficit (POC) 1.9 mmol/L    Sample source VENOUS BLOOD      CO2, POC 25 (H) 19 - 24 MMOL/L    Sodium,  136 - 145 MMOL/L    Potassium, POC 3.8 3.5 - 5.5 MMOL/L    Chloride,  100 - 108 MMOL/L    Glucose,  (H) 74 - 106 MG/DL    Creatinine, POC 1.4 (H) 0.6 - 1.3 MG/DL    Lactic Acid (POC) 1.95 0.40 - 2.00 mmol/L    pH, venous (POC) 7.33 7.32 - 7.42      pCO2, venous (POC) 47.2 41 - 51 MMHG    pO2, venous (POC) 38 25 - 40 mmHg   PROCALCITONIN    Collection Time: 07/14/22  2:43 PM   Result Value Ref Range    Procalcitonin <0.05 ng/mL   BLOOD GAS,CHEM8,LACTIC ACID POC    Collection Time: 07/14/22  2:51 PM   Result Value Ref Range    Calcium, ionized (POC) 1.23 1.12 - 1.32 mmol/L    BICARBONATE 26 mmol/L    Base deficit (POC) 0.7 mmol/L    Sample source VENOUS BLOOD      CO2, POC 26 (H) 19 - 24 MMOL/L    Sodium,  136 - 145 MMOL/L    Potassium, POC 3.8 3.5 - 5.5 MMOL/L    Chloride,  100 - 108 MMOL/L    Glucose, POC 86 74 - 106 MG/DL    Creatinine, POC 1.2 0.6 - 1.3 MG/DL    Lactic Acid (POC) 1.32 0.40 - 2.00 mmol/L    Critical value read back 25     pH, venous (POC) 7.34 7.32 - 7.42      pCO2, venous (POC) 48.1 41 - 51 MMHG    pO2, venous (POC) 38 25 - 40 mmHg       Radiologic Studies -   CT HEAD WO CONT   Final Result         No change or acute abnormality      CT CHEST WO CONT   Final Result      1. There are changes of emphysema. In the lungs dependently, there are   parenchymal opacities which contain areas of high density may represent areas of   scarring/atelectasis secondary to aspiration. 2. There are extensive atherosclerotic changes with extensive calcification in   the common carotid bifurcations. 3. No adenopathy is identified. 4. There is a 2.8 cm low-density liver which may represent a cyst but should be   confirmed with ultrasound nonemergently. XR CHEST PORT   Final Result   No acute process. CT Results  (Last 48 hours)               07/14/22 1611  CT HEAD WO CONT Final result    Impression:          No change or acute abnormality       Narrative:  EXAM: CT HEAD WO CONT       INDICATION: AMS       COMPARISON: 2/3/2021. CONTRAST: None. TECHNIQUE: Unenhanced CT of the head was performed using 5 mm images. Brain and   bone windows were generated. Coronal and sagittal reformats. CT dose reduction   was achieved through use of a standardized protocol tailored for this   examination and automatic exposure control for dose modulation. FINDINGS:   Ventricles cisterns are enlarged compatible with the overall degree of volume   loss. Extensive low-density in the periventricular white matter with chronic   left basal ganglia lacunar infarct. No significant interval progression. There   is no intracranial hemorrhage, extra-axial collection, or mass effect. The   basilar cisterns are open. No CT evidence of acute infarct. The bone windows demonstrate no abnormalities. The visualized portions of the   paranasal sinuses and mastoid air cells are clear.           07/14/22 1611  CT CHEST WO CONT Final result    Impression:      1. There are changes of emphysema. In the lungs dependently, there are   parenchymal opacities which contain areas of high density may represent areas of   scarring/atelectasis secondary to aspiration. 2. There are extensive atherosclerotic changes with extensive calcification in   the common carotid bifurcations. 3. No adenopathy is identified. 4. There is a 2.8 cm low-density liver which may represent a cyst but should be   confirmed with ultrasound nonemergently. Narrative:  INDICATION: Hypoxia       COMPARISON:       CONTRAST: None. TECHNIQUE:  5 mm axial images were obtained through the chest. Coronal and   sagittal reformats were generated. CT dose reduction was achieved through use   of a standardized protocol tailored for this examination and automatic exposure   control for dose modulation. The absence of intravenous contrast reduces the sensitivity for evaluation of   the mediastinum, jose, vasculature, and upper abdominal organs.        FINDINGS:   There is extensive calcification common carotid artery is. There are extensive   changes of atherosclerosis. CHEST WALL: No mass or axillary lymphadenopathy. THYROID: No nodule. MEDIASTINUM: No mass or lymphadenopathy. RADHA: No mass or lymphadenopathy. THORACIC AORTA: No aneurysm. MAIN PULMONARY ARTERY: Normal in caliber. TRACHEA/BRONCHI: Patent. ESOPHAGUS: No wall thickening or dilatation. HEART: Normal in size. There is coronary artery calcification. PLEURA: No effusion or pneumothorax. LUNGS: There are changes of emphysema. INCIDENTALLY IMAGED UPPER ABDOMEN: There is a 2.8 cm low-density left lobe of   the liver may represent a cyst but is nonspecific and correlation with   ultrasound nonemergently would be helpful. BONES: No destructive bone lesion. CXR Results  (Last 48 hours)               07/14/22 1226  XR CHEST PORT Final result    Impression:  No acute process. Narrative: Indication: Syncope       Comparison: 2/3/2021       Portable exam of the chest obtained at 1222 demonstrates normal heart size. There is no acute process in the lung fields. The osseous structures are   unremarkable. Medical Decision Making   I am the first provider for this patient. I reviewed the vital signs, available nursing notes, past medical history, past surgical history, family history and social history. Vital Signs-Reviewed the patient's vital signs. Patient Vitals for the past 12 hrs:   Temp Pulse Resp BP SpO2   07/14/22 1622 -- 80 18 (!) 153/79 90 %   07/14/22 1423 -- 100 19 -- (!) 88 %   07/14/22 1415 -- 97 21 (!) 142/71 90 %   07/14/22 1200 -- 98 20 93/71 96 %   07/14/22 1137 -- 92 23 (!) 84/69 97 %   07/14/22 1130 -- 97 20 (!) 85/61 98 %   07/14/22 1126 -- -- -- -- (!) 89 %   07/14/22 1124 98.8 °F (37.1 °C) 96 18 100/67 100 %       Records Reviewed: Nursing records and medical records reviewed    MDM:  Patient presenting with altered mental status.  Pt has stable vitals and POC glucose was checked immediately upon arrival. DDx: medication toxicity, infection, anemia, electrolyte/metabolic anomoly, hypercapnea, stroke/bleed/mass, dehydration, illicit drug intoxication. Will obtain labwork, UA, EKG and CT imaging of the head, chest xray. Will consider adding toxicologic workup if history unclear or warrants further investigation of toxic source. Will continue to monitor and reassess for admission. Provider Notes (Medical Decision Making):   Patient is a 38-year-old female presenting with behavioral disturbance from her assisted living facility. It appears there is been a significant decline in the patient over the last several weeks. It is if any none of the patient's power of , the patient's daughter, that the patient would be best served on hospice and so to her caregivers at the assisted living facility. I had the hospice team evaluate the patient in the ER today, and they will assume care of the patient tomorrow. Her lab work and CT scans were unremarkable for any emergent pathology that needs to be addressed today. We will have attempted obtain a urinalysis, ever, we are unable to do so. Given the amount of discomfort the straight catheterizations were causing her, we have made the decision to forego further testing on this measure. ED Course:   Initial assessment performed. The patients presenting problems have been discussed, and they are in agreement with the care plan formulated and outlined with them. I have encouraged them to ask questions as they arise throughout their visit. ED Course as of 07/14/22 1700   Thu Jul 14, 2022   1502 Case discussed with patient's daughter. She is the power of . She notes that patient has been having significant cognitive decline over the last several weeks to months. She was sent to the ER for confusion and lethargy today.   Her caregivers at the [facility] assisted living facility feel that patient's decline is very significant and that she may need to be reevaluated for hospice. The daughter agrees with this assessment. [CC]      ED Course User Index  [CC] Curtis Rothman MD       Medications Administered     sodium chloride 0.9 % bolus infusion 1,000 mL     Admin Date  07/14/2022 Action  New Bag Dose  1,000 mL Rate  1,000 mL/hr Route  IntraVENous Administered By  Chase Morfin RN                    Critical Care:  None      Disposition:  5:00 PM  Angelic Herrera  results have been reviewed with her. She has been counseled regarding her diagnosis. She verbally conveys understanding and agreement of the signs, symptoms, diagnosis, treatment and prognosis and additionally agrees to follow up as recommended with Dr. Rober Seay MD in 24 - 48 hours. She also agrees with the care-plan and conveys that all of her questions have been answered. I have also put together some discharge instructions for her that include: 1) educational information regarding their diagnosis, 2) how to care for their diagnosis at home, as well a 3) list of reasons why they would want to return to the ED prior to their follow-up appointment, should their condition change. DISCHARGE PLAN:  1. Current Discharge Medication List        2. Follow-up Information     Follow up With Specialties Details Why [address] [place] [address] team  In 1 day Hospice  will assume care of patient starting tomorrow per home hospice rest recommendations. [facility] EMERGENCY DEPT Emergency Medicine In 2 days If symptoms worsen [address] [place] [address]  [phone]        3. Return to ED if worse     Diagnosis     Clinical Impression:   1. Late onset Alzheimer's dementia with behavioral disturbance ([facility] [address]    2.  Altered mental status, unspecified altered mental status type        Attestations:    Wyatt Camarena MD    Please note that this dictation was completed with Plaid, the computer voice recognition software. Quite often unanticipated grammatical, syntax, homophones, and other interpretive errors are inadvertently transcribed by the computer software. Please disregard these errors. Please excuse any errors that have escaped final proofreading. Thank you.

## 2022-07-14 NOTE — PROGRESS NOTES
Patient to be admitted under [facility]  hospice tomorrow. Patient okay to be discharged back to [facility] today per [facility] [phone]. Patient daughter updated on plan by hospice nurse.      Transport home to   [address]    Rebekah Sheriff, FATOUN, RN, BSW   RN Care Manager

## 2022-07-14 NOTE — DISCHARGE INSTRUCTIONS
Effective tomorrow, the home hospice team will assume care of patient and provide comfort measures at home per the request of the family, the power of , and the hospice team.    It was a pleasure taking care of you at [facility] today. We know that when you come to [facility], you are entrusting us with your health, comfort, and safety. Our physicians and nurses honor that trust, and we truly appreciate the opportunity to care for you and your loved ones. We also value your feedback. If you receive a survey about your Emergency Department experience today, please fill it out. We care about our patients' feedback, and we listen to what you have to say. Thank you!

## 2022-07-14 NOTE — ED NOTES
On cardiac monitor att, placed into gown, resting w/o complaints monitoring for farther orders.  Placed on 2lpm 89% sp02

## 2022-07-14 NOTE — PROGRESS NOTES
CM received verbal consult from ED for home hospice. Patient resides at [facility]. Patient has advanced dementia and has a recent cognitively and functioning decline. CM placed call to patient's daughter/GERALDINE Ulloa [phone] to discuss. Per patient's daughter patient has had hospice with [facility] in the past. Per patient's daughter she would like [facility] to evaluate patient for home hospice criteria. CM placed referral to [facility] home hospice. Awaiting hospice RN to evaluate patient in ED.        Memory Dinorah, BSN, RN, BSW   RN Care Manager

## 2022-07-15 NOTE — HOSPICE
[facility] Help to Those in Need  [phone]     Patient Name: Marcela Dumont  YOB: 1945  Age: 64 y.o. [facility] Note:  Hospice consult received, reviewing chart. Will follow up with Unit Nurse and Care Manager to discuss plan of care, patient status and discharge disposition within the hour. Spke with daughter and she is agreeable to hospice back at [facility]. Edna Bey went with me to eval patient and for F2F. Patient is very confused, thinks she is 13 and too young to have children. She was very scared. We reassured her she was in a safe place. Spoke with ROXANA Geiger and she will get her back to the [facility] once tests are completed. Spoke with ER  and he agrees. Admit set up for 12 noon on Friday. Thank you for the opportunity to be of service to this patient.   [address] Liaison  [phone] c  [phone] o

## 2022-07-15 NOTE — HOSPICE
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 Layla Lyman   Primary Care Physician:      Admitting RN: Vinicio Mujica RN  Nurse CM: Cristiano Keller Worker: Jailene Proper: Ace Mcgee DNR: yes, on file     Service:      Home:     Direct Observation:    Received patient sitting outside on porch with daughter and facility caregiver. Patient is alert and confused. She does not recognize her daughter currently. Per caregiver, her recognition of family members comes and goes. Patient able to answer some yes/no questions, but mostly answers I don't know during assessment. She does not actively participate in conversation and appears to become more agitated when attempting to engage with her. Patient allowed physical assessment to be completed. BP hypotensive during visit. Patient unable to answer questions regarding dizziness or unsteadiness. She is unable to verbalize pain. No nonverbal signs or pain or respiratory distress noted during visit. Per caregiver, she is dependent on assistance with ADL's. She is now incontinent of bowel and bladder. Her appetite is decreasing, and she has lost weight of approximately 20 lbs since January. Patient having episodes of increased agitation, which has improved some in the last 2 weeks since being started on Seroquel 12.5mg BID. Patient ambulates with unsteady gait and standby assistance. Per caregiver, patient wanders at night sometimes and takes naps during the day. Recently she was hospitalized after a syncopal episode and was determined to be caused by dehydration. Reviewed hospice philosophy and goals of care with daughterSilviano. Provided opportunity for questions.  Med rec completed with  Ant and BP meds d/c'd on admission. Daughter and facility staff verbalized understanding. Reviewed SRK indications and side effects. SRK ordered through [facility] with request for prefilled syringes (per facility). Personal care supplies ordered through [employer]. Hospital bed, half rails, gel overlay, OBT, and transport chair ordered through [employer] for delivery today. Encouraged daughter and facility staff to call with questions, concerns, or changes in patient condition. Palliative Performance Scale: 40%        ER Visits/ Hospitalizations in past year: 1  Onset Date of Hospice Diagnosis: 2022  Summary of Disease Progression Leading to Hospice Diagnosis:  Excerpted from ED notes of Dr. Yael Torres  HPI: Heather Crow, 64 y.o. female with a past medical history significant for Coronary disease, dementia, history of CHF, anxiety, depression presents to the ED with cc of worsening mental status, possible syncopal episode while at assisted living facility. Patient reportedly has been declining over the last several months and has very advanced dementia. At one point she was on hospice but was taken off that after she had a temporary improvement 6 to 7 months ago. This decline seems to have reversed itself. History severely limited due to patient's altered mental status. There is been no change in medications. No other associated symptoms. No other exacerbating or mounting factors.       Use LCD Guidelines and list features:    Patients will be considered to be in the terminal stage of dementia (life expectancy of six months or less) if they meet the following criteria. Patients with dementia should show all the following characteristics:    ___x_____  1. Stage seven or beyond according to the Functional Assessment Staging Scale;  ___x_____  2. Unable to ambulate without assistance;  ___x_____  3. Unable to dress without assistance;  ___x_____  4. Unable to bathe without assistance;  __x______  5.  Urinary and fecal incontinence, intermittent or constant;  ___x_____  6. No consistently meaningful verbal communication: stereotypical phrases only or the ability             to speak is limited to six or fewer intelligible words. Patients should have had one of the following within the past 12 months:    ________  1. Aspiration pneumonia;  ________  2. Pyelonephritis or other upper urinary tract infection;  ________  3. Septicemia;  ________  4. Decubitus ulcers, multiple, stage 3-4;  ________  5. Fever, recurrent after antibiotics;  ____x____  6. Inability to maintain sufficient fluid and calorie intake with 10% weight loss during the            previous six months or serum albumin Note: This section is specific for Alzheimer's Disease            and related disorders, and is not appropriate for other types of dementia, such as multi-           infarct dementia. SPIRITUAL/Social/Emotional/psych:     Dr. Devendra Henley contacted, agrees to serve as attending provider for hospice and provided verbal certification of terminal illness with prognosis of 6 months or less life expectancy. Orders for hospice admission, medications and plan of treatment received. Medication reconciliation completed.         Currently this patient has:  none        MEDS:  I have reviewed the patient's medication list with MD and identified the following:  Nonformulary medications: n/a  Unrelated medications: Synthroid, latanoprost     IDT communication to include MD, SN, SW, [address] [place] [address] and support team.

## 2022-07-16 NOTE — HOSPICE
SN visit done s/p admission f/u. Prior to visit I spoke with daughter Tg Blackwell who asked me to see how much Latanoprost pt. had left at the [address] [place] [address] facility so she could reorder and also asked me to see if pt.s arms still had small bumps on them. Upon visit arrival at [address] [place] [address] home pt. noted sitting in dayroom in [place]. No s/s of pain/discomfort or SOB. Pt. alert though confused with ST and LT memory issues and limited vocabulary. She ambulated with hands on assist by caregiver to her room. I obtained VS and visited with pt. Her arms were clear of any rashes or small bumps. Bilat. mild ankle edema noted. The caregiver had several bottles left of Latanoprost.. Daughter Tg Blackwell was called and updated on pt. status after visit and encouraged to call Hospice with any questions or concerns. Understanding was verbalized.

## 2022-07-18 NOTE — HOSPICE
[facility] is alert and oriented to person. Watching TV. Patient reports no pain  Appetite is fair. Sometimes she doesn't eat at all. She feeds herself, but is messy with it. Ambulates with standby assist. Sometimes stumbles  She is incontinent of bowel and bladder. annette Dobbs pull ups and wipes ordered.

## 2022-07-19 NOTE — HOSPICE
PRN visit made to patient after caregiver reported two syncopal episodes this AM. On arrival patient in [place] chair with legs elevated. Patient is alert but confused, denies pain, SOB, and dizziness. Caregiver reports that patient always answers no to all assessment questions. Orthostatic blood pressures obtained, and results discussed with Andris Blizzard NP. No changes made in plan of care at this time will continue to monitor patient. Per caregiver patient is eating and drinking well and has not required any PRN medications. LBM was 3 days ago, caregiver advised to give bisacodyl suppository if no BM tomorrow. Reviewed comfort medications. Encouraged to call hospice with any questions, concerns and change in patient condition.

## 2022-07-19 NOTE — HOSPICE
LMSW arrived at the [facility] to complete an Initial Psychosocial Assessment visit for [facility]. The LMSW was greeted at the front door by the patient's HHA. The patient is a 65 y/o  female with a [facility] Dx. Senile degeneration of brain. Patient was received sitting on a lounge chair watching TV. Patient was observed ambulating with a 0ne-person assist to her room. Patient was alert and oriented to self only. Patient worried consistently about falling off the side of the bed and if the HHA was coming back to bring the patient into the common area. LMSW reassured the patient that the patient will not fall off the bed and if the HHA does not come back for the patient then this LMSW will aid with ambulation for the patient to get to the common area. Patient had one-to-two-word answers for all questions. Staff reported that the patient's appetite is fair to good. Patient can sleep through the night. Staff reported that the patient's mood is typically good. Patient's displayed signs of regression to a young girl and early 19's according to Raúl Vaughan. LMSW provided community resources, emotional support, and information on the ancillary services.

## 2022-07-20 NOTE — HOSPICE
Called pt's daughter to introduce  and note our desire to support family. She noted that Pt was raised Taoist but started to attend a [employer] so her family would all be at the same [place] as her  was [address]. However, she has now gone back to her [address] tradition as evidenced by reciting the rosary.  I reached out to Bahai of the [place] rao will see her July 20 th.

## 2022-07-26 NOTE — HOSPICE
RN dispatched to patients home for PRN visit for bilateral leg swelling. On arrival, RN greeted by patients caregiver. On arrival, patient resting in recliner. Patient appears to be in no pain and no obvious signs of shortness of breath. Patients caregiver expressed that patients bilateral legs have been swollen since yesterday but patients legs are more swollen today. Patients ankles are swollen bilaterally with 3 +edema noted. RN attempts to elevate patients legs but patient does not want them elevated. Physical assessment completed. Vital signs obtained. Verito Chambers NP contacted regarding patients swelling in legs. No new orders given currently due to patients history of syncopal episode. Patients caregivers are to elevate patients legs at night and as much as patient will allow. RN to follow up on patients legs swelling on Friday July 29 th. Patients caregiver verbalized understanding of patient teaching and able to teach back. Refill on medication ordered. Supplies ordered. Patients daughter notified of RN visit and updated. On departure, patient resting comfortable in recliner and watching TV. RN educates caregiver on calling hospice 24/7 with any questions or concerns.

## 2022-07-29 NOTE — HOSPICE
Routine hospice SN visit  Patient received sitting up in recliner chair awake and alert  Makes eye contact and responds verbally  Oriented only to person   Color pale Skin warm and dry   No S/S of pain or shortness of breath   Patient denies having any discomfort during visit   Per facility staff no new issues since visit earlier this week    Bilateral ankles remain edematous 3+ pitting  Encouraged patient to keep lower extremities elevated     To continue with current hospice plan of care   Informed faciity staff caregiver to call hospice for concerns / needs  Understanding verbalized

## 2022-08-02 NOTE — HOSPICE
Performed routine nursing assessment. Patient sitting in chair in common area. walked her back to her room for assessment. Patient confused and oriented to self only. No s/s of pain or dyspnea, skin intact, ambulates with stand by assistance. Patient wanted to get back to common area to watch tv. Walked her back to her chair. Staff reports no medication refills needed via telephone conversation with [facility].    supplies ordered: XL pull ups, chux, barrier cream, wipes, shampoo, bath basins

## 2022-08-03 NOTE — HOSPICE
Initial spiritual care visit with the patient. The patient was sitting in the living room watching TV with the other residents. The patient was confused but did engage briefly with the  . She said she was [address]. The patient was not able to initiate conversation but did respond when asked questions. She said she remembered someone visiting but did not remember he was a . The patient said she enjoys watching TV. She would get confused as to why the  was visiting with her. She introduced other residents to the  so the  could engage with them as well . She did not like being the center of the visit. The  talked to the painet and other residents about how they were doing. The patient said the  could return for another visit.

## 2022-08-11 NOTE — HOSPICE
Patient rec'd in [address] in common area. Caregiver, Susana, present  BLE edema is improved. L > R. Patient does not tolerate elevation  Appetite is ok. Caregiver reports that sometimes she eats, sometimes she doesn't. Sometimes throwing food. RMAC has decreased 0.5 cm   No supplies or medications needed.   Encouraged to call [address] with any concerns

## 2022-08-16 NOTE — HOSPICE
LMSW arrived at the [facility] to complete a routine visit for [facility]. The LMSW was greeted at the front door by the patient's HHA. The patient is a 63 y/o  female with a [facility] Dx. Senile degeneration of brain. Patient was received sitting on a lounge chair watching TV. LMSW spent some time talking to the patient. Patient had one-to-two-word answers for all questions. Staff reported that the patient's appetite is fair to good, Staff reported that the patient does not eat. Patient can sleep through the night. Staff reported that the patient's mood is typically good. LMSW provided emotional support and information on the ancillary services.

## 2022-08-18 NOTE — HOSPICE
Patient rec'd in living area watching tv. Patient walked back to her room with stand by assist.  BLE edema present. Feet were dependent. She does not like elevation  Staff reports patient is eating approximately 25% of her meals. She is having normal bowel movements  No supplies or medications needed at this time.

## 2022-08-26 NOTE — HOSPICE
Patient sitting in chair in living room upon arrival for visit; patient's caregivers and other residents of the home present. Patient alert and oriented to self. Patient denies any pain or dyspnea. Caregiver Susana reports patient is only eating approximately 25% of meals. Incontinent of bowel and bladder; regular bowel movements. Edema to bilateral lower legs/feet; left more edematous than right. Encouraged elevation of patient's legs, however patient prefers legs in dependent position. Multiple supply items ordered for delivery via [employer]. Phone call made to patient's daughter Dave Murrell for visit update; no concerns voiced at this time. Reinforced hospice 24/7 for any concerns or change in patient's condition.

## 2022-09-01 NOTE — HOSPICE
Patient rec'd in [place]. Feet elevated. Watching television  She is alert and cooperative. Assessment completed. Susana, caregiver, reports patient has had a cough for 2 days along with a runny nose. No cough noted at visit and lungs are clear to auscultation. Patient ambulated outside with stand by assistance. Seroquel ordered. Spoke with NP, Adalid Morel. Will recheck blood pressure tomorrow. Robitussin DM 2 tsp q4h prn ordered  Encouraged to call [employer] with any concerns. Patients will be considered to be in the terminal stage of dementia (life expectancy of six months or less) if they meet the following criteria. Patients with dementia should show all the following characteristics:    ____x____  1. Stage seven or beyond according to the Functional Assessment Staging Scale;  _____x___  2. Unable to ambulate without assistance;  ___x_____  3. Unable to dress without assistance;  _____x___  4. Unable to bathe without assistance;  ____x____  5. Urinary and fecal incontinence, intermittent or constant;  ____x____  6. No consistently meaningful verbal communication: stereotypical phrases only or the ability             to speak is limited to six or fewer intelligible words. Patients should have had one of the following within the past 12 months:    ________  1. Aspiration pneumonia;  ________  2. Pyelonephritis or other upper urinary tract infection;  ________  3. Septicemia;  ________  4. Decubitus ulcers, multiple, stage 3-4;  ________  5. Fever, recurrent after antibiotics;  ____x____  6. Inability to maintain sufficient fluid and calorie intake with 10% weight loss during the            previous six months or serum albumin Note: This section is specific for Alzheimer's Disease            and related disorders, and is not appropriate for other types of dementia, such as multi-           infarct dementia.    NEW MEDICATION INITIATION DOCUMENTATION:  Consulted AT MD to report change in patient status: YES  Obtained Order from Provider for initiation of Symptom relief medication / other medication needed:YES   Documentation completed in Telephone/Visit Note in [facility]  Reason medication is being initiated: cough  MD / Provider name consulted re: change in status / initiation of new medication: Edna Bey NP  New Symptom(s): cough  New Order(s): Robitussin DM 2 tsp q4h prn  Name of person being taught: Susana  Instructions given: YES  Side Effects taught:YES  Response to teaching: Yan Soares verbalizes understanding  Update given to daughter Dave Murrell who reports patient fainted again on Tuesday

## 2022-09-04 NOTE — HOSPICE
PRN visit to recheck blood pressure. Blood pressure is lower than yesterday. [facility] reports that when she checked her blood pressure yesterday evening, it was 589 systolic. Will continue to monitor.

## 2022-09-07 NOTE — HOSPICE
pt, staff and residents sitting on front porch. pt denies pain, resp even and unlabored. Susana states pt does not need any refills but reports cough syrup was ordered and didn't come. called [employer] and ordered guafenesin/dextromethorphan to be delivered tomorrow. pt has slight non productive cough. ordered chux and pull ups.  remonded staff to call hospice for any needs

## 2022-09-09 NOTE — HOSPICE
LMSW arrived at the [facility] to complete a routine visit for [facility]. The LMSW was greeted at the front door by the patient's HHA. The patient is a 65 y/o  female with a [facility] Dx. Senile degeneration of brain. Patient was received sitting on a lounge chair watching TV. LMSW spent some time talking to the patient. Patient had one-to-two-word answers for all questions. Staff reported that the patient's appetite is fair. Staff reported that the patient does not eat. Patient can sleep through the night. Staff reported that the patient's mood is typically good. Patient stated that she remembered this LMSW but then asked his name. Patient was observed ambulating with a 1-person assist. Patient stated that she likes to sit on the front porch and watch the neighbors across the street. Patient reported that she does not remember her daughter Diaz Austin who visited yesterday or other hospice staff that visits throughout the month. LMSW informed the patient that it is okay if you do not remember, we remember you. Patient replied that is nice. LMSW provided emotional support and information on the ancillary services.

## 2022-09-13 NOTE — HOSPICE
Music Therapy Assessment  Ca Seymour  [facility], [facility]  Patient Telephone Number: ([phone]) [phone]  Alevism Affiliation: Jennifer  Language: [place]     Date: [birth year]    Mental Status:   [ X ] Alert [  ] Dorothey Page Camron ]  Confused  [  ] Minimally responsive  [  ] Sleeping    Communication Status: [  ] Impaired Speech [  ] Verbal     Physical Status:   [  ] Oxygen in use  [  ] Hard of Hearing [  ] Vision Impaired   [  ] Ambulatory  [  ] Ambulatory with assistance Camron  ] Non-ambulatory     Music Preferences, Background: _especially likes Joey, enjoys singing; familiar with Mandaen Nitol Solarns    Clinical Problem addressed: _Decreased socialization secondary to confusion;     Goal(s) met in session:  Physical/Pain management (Scale of 1-10):    Pre-session rating ___________    Post-session rating __________  [  ] Increased relaxation               [  ] Affected breathing patterns  [  ] Decreased muscle tension               [  ] Decreased agitation  [  ] Affected heart rate    [ X ] Increased alertness     Emotional/Psychological:  [ X ] Increased self-expression   [  ] Decreased aggressive behavior   [  ] Decreased feelings of stress              [  ] Discussed healthy coping skills     [  ] Improved mood    [  ] Decreased withdrawn behavior     Social:  [  ] Decreased feelings of isolation/loneliness [ X ] Positive social interaction    [  ] Provided support and/or comfort for family/friends    Spiritual:  Camron  ] Spiritual support    [  ] Expressed peace  Camron ] Expressed magdalena    [  ] Discussed beliefs    Techniques Utilized (Check all that apply):   [  ] Procedural support MT [  ] Music for relaxation             Camron ] Patient preferred music  [  ] Rukhsana analysis  [  ] Song choice              Camron ] Music for validation  [  ] Entrainment              [  ] Movement to music             [  ] Guided visualization  [  ] Ezekiel Fitzgerald  [  ] Patient instrument playing [  ] Loy Whitmore writing  [ X ] Singing with peers  [  ] Luke Vallejo              [ X ] Sensory stimulation  [ X ] Active Listening  Pattcht.Bristle ] Music for spiritual support [  ] Making of CDs as gifts    Session Observations:  Estefany Fong was seated in a comfortable chair in [facility] room, with several other residents seated near her. She smiled and greeted this therapist with eye contact and was able to sing with some of the familiar Joey songs. Her peers often looked over at her and engaged in conversation, which provided a social atmosphere. Mrs. Hill requested a follow up visit, which will be provided. Thank you for the opportunity to provide music therapy to Estefany Fong.   Neha Saenz [place], [place]   Music [address] Certified  Spiritual Care Services  Referral- based service

## 2022-09-13 NOTE — HOSPICE
Patient eating lunch on arrival. Feeding herself. Patient assisted out to the porch by staff. Patient smiling and engaging  VSS  Staff reports she is eating 50% of her meals, but sometimes less  Her urine, which was dark, is lighter. Staff pushing water  Blood pressure is better.     Seroquel ordered via [employer], pull ups ordered from [employer]

## 2022-09-15 NOTE — HOSPICE
This was a follow-up visit to continue the offer of spiritual and social support to pt. Upon my arrival, Dana Dixon was in living room area watching TV with other residents. Introduced myself to her and inquired as to how she was doing. She noted she was okay, just watching TV with the group. As we visited it was obvious she wanted to simply watch TV with her group. I offered to allow her to watch TV but asked if we could try another time. She said we could.

## 2022-09-20 NOTE — HOSPICE
prn visit to evaluate swelling and discolored toes of left leg. Per aide in care of the patient, no recent injury or accident. Swelling was noted on Saturday. Pt in bed, resting, PAINAD zero, FAST 6 D, vitals WNL, no c/o pain, no noted pain or resp. distress. Left lower leg with 2+ edema toes to knee. Not able to palpate pedal pulses due to edema. All toes are dark purple but the great toe. Pt denies pain on exam and staff state she has been able to walk but is limping on this leg. Call to [facility] and Dr. Kayleigh Knapp who is on call. Plan to get venous doppler tomorrow and treat according to results. Daughter Kaylee Chan would like to treat if this is a DVT. I explained to her this would include non-invasive treatments according to the hospice POC. I updated the  Ana Luisa by phone. Order entered into EP[facility] for the imaging. Will report to the team to f/u on scheduling the testing tomorrow.

## 2022-09-22 NOTE — HOSPICE
Joint visit made with Riaz for a PRN visit for patient edema and bruising to left foot. Eliquis 5 mg BID for delivery today via [address] is tender to the touch. Xray of left foot ordered.   Daughter updated with plan of care by Jonatan Perez DOCUMENTATION:  Consulted AT MD to report change in patient status: YES  Obtained Order from Provider for initiation of Symptom relief medication / other medication needed:YES   Documentation completed in Telephone/Visit Note in [facility]  Reason medication is being initiated:   MD / Provider name consulted re: change in status / initiation of new medication: JERONIMO Mello  New Symptom(s): blood clots to left leg  New Order(s): Eliquis 5 mg BID  Name of person being taught: Maribell  Instructions given: YES  Side Effects taught:YES  Response to teaching: Maribell verbalized understanding

## 2022-09-22 NOTE — HOSPICE
Patient sitting in chair in living room upon arrival for visit; patient's caregivers and other residents of the home present. Patient alert and oriented to self. Patient denies any pain or dyspnea. Caregiver Susana reports patient's appetite continues to decrease; eating only bites and sips. Incontinent of bowel and bladder; regular bowel movements. Edema and purple bruising noted to left foot; picture sent to hospice NP. Patient denies any pain or discomfort to the area, even with touch and movement. Patient's RNCM confirmed this morning with [facility] that a doppler will be performed today. Phone call made to [facility] and redirected to send an email for ETA. Email sent; awaiting response. RNCM and clinical manager updated that [facility] has not yet performed the doppler and a response from them has not been received. Medication refilled for delivery via [employer]: Seroquel. Multiple supply items ordered for delivery via [employer]. [facility] 24/7 for any concerns or change in patient's condition.

## 2022-09-26 NOTE — HOSPICE
Music Therapy Progress Note  Geneva Gan  [facility], [address]     Patient Telephone Number: [phone]  Druze Affiliation: Bette Oliveira  Language: English    Date: 9/22/22    Mental Status:   [  ] Alert [  ] Micah Noam Fair  ]  Confused  [  ] Minimally responsive  [  ] Sleeping    Communication Status: [  ] Impaired Speech [  ] Verbal     Physical Status:   [  ] Oxygen in use  [  ] Hard of Hearing [  ] Vision Impaired   [  ] Ambulatory  [  ] Ambulatory with assistance Manjula  ] Non-ambulatory     Music Preferences, Background: _Sings with familiar songs (René Anderson) and hymns;     Clinical Problem addressed: _Agitation and receptivity to music    Goal(s) met in session:  Physical/Pain management (Scale of 1-10):    Pre-session rating ___________    Post-session rating __________  [ Satinder Rocha ] Increased relaxation               [  ] Affected breathing patterns  [ X ] Decreased muscle tension               [ X ] Decreased agitation  [  ] Affected heart rate    [ X ] Increased alertness     Emotional/Psychological:  Manjula  ] Increased self-expression   [  ] Decreased aggressive behavior   [  ] Decreased feelings of stress              [  ] Discussed healthy coping skills     [  ] Improved mood    [  ] Decreased withdrawn behavior     Social:  [  ] Decreased feelings of isolation/loneliness [ X ] Positive social interaction    [  ] Provided support and/or comfort for family/friends    Spiritual:  [  ] Spiritual support    [  ] Expressed peace  [ X ] Expressed magdalena    [  ] Discussed beliefs    Techniques Utilized (Check all that apply):   [  ] Procedural support MT [  ] Music for relaxation             [X] Patient preferred music  [  ] Rukhsana analysis  [  ] Song choice              Manjula ] Music for validation  [  ] Entrainment              [  ] Movement to music             [  ] Guided visualization  [  ] Salma Bernardo  [  ] Patient instrument playing [  ] Erline Bosworth writing  [  ] Ronal Cano along   [X] Improvisation              [  ] Sensory stimulation  [ X ] Active Listening  [X] Music for spiritual support [  ] Making of CDs as gifts    Session Observations:  Doreen Benavidez was in the common area in her residence sitting up in a recliner chair for her music therapy visit. She was looking down, with her eyebrows furrowed, and holding tightly to the side of her chair which was metal. Her hands and arms were tense and expressed fear and anxiety over being taken to the hospital. Comforting songs and discussion helped to relieve some of her anxiety, and she sang in a strong voice for \"You are my Sunshine\" and \"Love me Tender. \" Between songs, she expressed fear over dying, and was afraid of punishment for wrongdoing. With gentla and calm words and songs, the messages of God's love and eternal peace were sung and spoken, through familiar hymns and improvised statements. Her caregiver and her daughter, in conversations before the visit, informed of a new dx of clots in legs, indicating a new medication had been ordered. Mrs. Hill was offered a small blanket and warm hand hold, to help release her hand from the side of the chair, and this was effective.   Dina Shabazz [place], [facility]   Music [address] Certified  Spiritual Care Services  Referral- based service

## 2022-09-28 NOTE — HOSPICE
No new spiritual care need nor has family reached out for support in the last 12 days of the benefit period.   Visit planned for 9/14/22

## 2022-10-04 NOTE — HOSPICE
Patient sitting in chair in living room upon arrival for visit; patient's caregivers and other residents of the home present. Patient alert and oriented to self. Patient denies any pain or dyspnea. Patient less verbal today than previous visits. No recent changes reported by caregivers. Left foot notably less edematous and very minimal residual bruising observed. Medication refilled for delivery via [employer]: Seroquel, Eliquis. Multiple supply items ordered for delivery via [employer]. Phone call made to patient's daughter Girma Kelsey for visit update; no needs verbalized at this time. [facility] 24/7 for any concerns or change in patient's condition.

## 2022-10-15 NOTE — HOSPICE
Joint visit made with Timothy Byrne NP for recertification for hospice services. Patient seated in gerichair in living room watching TV with other residents. Patient is alert, oriented to person  Facility staff states that her appetite has gotten somewhat better. Swelling to left leg is improving.

## 2022-10-16 NOTE — HOSPICE
TOD 1010 after one minute of auscultation with no respirations or heartbeat. Postmortem care provided by facility. Family has been to home and is ready for removal per facility staff, Enio Ogden. Call to daughter Raúl Vaughan to offer condolences and confirm that family is ready for removal.  Raúl Vaughan verfied FH and that family is ready for removal.  [facility] notified for removal.  Medications wasted per [id number] policy in presence of facility staff. Dr. Yvette Chen notified of TOD. DME notified for  next business day.

## 2022-10-19 ENCOUNTER — HOME CARE VISIT (OUTPATIENT)
Dept: HOSPICE | Facility: HOSPICE | Age: 77
End: 2022-10-19
Payer: MEDICARE

## 2022-10-26 ENCOUNTER — HOME CARE VISIT (OUTPATIENT)
Dept: HOSPICE | Facility: HOSPICE | Age: 77
End: 2022-10-26
Payer: MEDICARE
